# Patient Record
Sex: FEMALE | Race: WHITE | Employment: OTHER | URBAN - METROPOLITAN AREA
[De-identification: names, ages, dates, MRNs, and addresses within clinical notes are randomized per-mention and may not be internally consistent; named-entity substitution may affect disease eponyms.]

---

## 2019-05-28 ENCOUNTER — APPOINTMENT (OUTPATIENT)
Dept: CT IMAGING | Age: 84
DRG: 176 | End: 2019-05-28
Payer: MEDICARE

## 2019-05-28 ENCOUNTER — APPOINTMENT (OUTPATIENT)
Dept: GENERAL RADIOLOGY | Age: 84
DRG: 176 | End: 2019-05-28
Payer: MEDICARE

## 2019-05-28 ENCOUNTER — HOSPITAL ENCOUNTER (INPATIENT)
Age: 84
LOS: 4 days | Discharge: NURSING, REHAB OR LONG-TERM CARE | DRG: 176 | End: 2019-06-01
Attending: EMERGENCY MEDICINE | Admitting: INTERNAL MEDICINE
Payer: MEDICARE

## 2019-05-28 DIAGNOSIS — I26.99 BILATERAL PULMONARY EMBOLISM (HCC): Primary | ICD-10-CM

## 2019-05-28 DIAGNOSIS — J96.01 ACUTE RESPIRATORY FAILURE WITH HYPOXIA (HCC): ICD-10-CM

## 2019-05-28 DIAGNOSIS — S09.90XA CLOSED HEAD INJURY, INITIAL ENCOUNTER: ICD-10-CM

## 2019-05-28 DIAGNOSIS — S43.014A ANTERIOR SHOULDER DISLOCATION, RIGHT, INITIAL ENCOUNTER: ICD-10-CM

## 2019-05-28 LAB
A/G RATIO: 1.1 (ref 1.1–2.2)
ALBUMIN SERPL-MCNC: 3.7 G/DL (ref 3.4–5)
ALP BLD-CCNC: 78 U/L (ref 40–129)
ALT SERPL-CCNC: 21 U/L (ref 10–40)
ANION GAP SERPL CALCULATED.3IONS-SCNC: 15 MMOL/L (ref 3–16)
APTT: 36.1 SEC (ref 26–36)
AST SERPL-CCNC: 29 U/L (ref 15–37)
BASOPHILS ABSOLUTE: 0.1 K/UL (ref 0–0.2)
BASOPHILS RELATIVE PERCENT: 0.7 %
BILIRUB SERPL-MCNC: 0.7 MG/DL (ref 0–1)
BUN BLDV-MCNC: 23 MG/DL (ref 7–20)
CALCIUM SERPL-MCNC: 9.4 MG/DL (ref 8.3–10.6)
CHLORIDE BLD-SCNC: 103 MMOL/L (ref 99–110)
CO2: 24 MMOL/L (ref 21–32)
CREAT SERPL-MCNC: 1.1 MG/DL (ref 0.6–1.2)
EOSINOPHILS ABSOLUTE: 0.2 K/UL (ref 0–0.6)
EOSINOPHILS RELATIVE PERCENT: 2 %
GFR AFRICAN AMERICAN: 57
GFR NON-AFRICAN AMERICAN: 47
GLOBULIN: 3.3 G/DL
GLUCOSE BLD-MCNC: 197 MG/DL (ref 70–99)
HCT VFR BLD CALC: 45.3 % (ref 36–48)
HEMOGLOBIN: 15 G/DL (ref 12–16)
INR BLD: 1.1 (ref 0.86–1.14)
LYMPHOCYTES ABSOLUTE: 1.6 K/UL (ref 1–5.1)
LYMPHOCYTES RELATIVE PERCENT: 14.4 %
MCH RBC QN AUTO: 31.8 PG (ref 26–34)
MCHC RBC AUTO-ENTMCNC: 33 G/DL (ref 31–36)
MCV RBC AUTO: 96.3 FL (ref 80–100)
MONOCYTES ABSOLUTE: 1 K/UL (ref 0–1.3)
MONOCYTES RELATIVE PERCENT: 9.7 %
NEUTROPHILS ABSOLUTE: 7.9 K/UL (ref 1.7–7.7)
NEUTROPHILS RELATIVE PERCENT: 73.2 %
PDW BLD-RTO: 15.1 % (ref 12.4–15.4)
PLATELET # BLD: 186 K/UL (ref 135–450)
PMV BLD AUTO: 9.7 FL (ref 5–10.5)
POTASSIUM SERPL-SCNC: 4.1 MMOL/L (ref 3.5–5.1)
PRO-BNP: 7136 PG/ML (ref 0–449)
PROTHROMBIN TIME: 12.5 SEC (ref 9.8–13)
RBC # BLD: 4.71 M/UL (ref 4–5.2)
SODIUM BLD-SCNC: 142 MMOL/L (ref 136–145)
TOTAL PROTEIN: 7 G/DL (ref 6.4–8.2)
TROPONIN: <0.01 NG/ML
WBC # BLD: 10.9 K/UL (ref 4–11)

## 2019-05-28 PROCEDURE — 96375 TX/PRO/DX INJ NEW DRUG ADDON: CPT

## 2019-05-28 PROCEDURE — 80053 COMPREHEN METABOLIC PANEL: CPT

## 2019-05-28 PROCEDURE — 70450 CT HEAD/BRAIN W/O DYE: CPT

## 2019-05-28 PROCEDURE — 83880 ASSAY OF NATRIURETIC PEPTIDE: CPT

## 2019-05-28 PROCEDURE — 85610 PROTHROMBIN TIME: CPT

## 2019-05-28 PROCEDURE — 84484 ASSAY OF TROPONIN QUANT: CPT

## 2019-05-28 PROCEDURE — 96374 THER/PROPH/DIAG INJ IV PUSH: CPT

## 2019-05-28 PROCEDURE — 99285 EMERGENCY DEPT VISIT HI MDM: CPT

## 2019-05-28 PROCEDURE — 96361 HYDRATE IV INFUSION ADD-ON: CPT

## 2019-05-28 PROCEDURE — 72125 CT NECK SPINE W/O DYE: CPT

## 2019-05-28 PROCEDURE — 1200000000 HC SEMI PRIVATE

## 2019-05-28 PROCEDURE — 73060 X-RAY EXAM OF HUMERUS: CPT

## 2019-05-28 PROCEDURE — 6360000004 HC RX CONTRAST MEDICATION: Performed by: EMERGENCY MEDICINE

## 2019-05-28 PROCEDURE — 85730 THROMBOPLASTIN TIME PARTIAL: CPT

## 2019-05-28 PROCEDURE — 2700000000 HC OXYGEN THERAPY PER DAY

## 2019-05-28 PROCEDURE — 71260 CT THORAX DX C+: CPT

## 2019-05-28 PROCEDURE — 85025 COMPLETE CBC W/AUTO DIFF WBC: CPT

## 2019-05-28 PROCEDURE — 2580000003 HC RX 258: Performed by: NURSE PRACTITIONER

## 2019-05-28 PROCEDURE — 93005 ELECTROCARDIOGRAM TRACING: CPT | Performed by: NURSE PRACTITIONER

## 2019-05-28 PROCEDURE — 6360000002 HC RX W HCPCS: Performed by: NURSE PRACTITIONER

## 2019-05-28 PROCEDURE — 73030 X-RAY EXAM OF SHOULDER: CPT

## 2019-05-28 PROCEDURE — 94761 N-INVAS EAR/PLS OXIMETRY MLT: CPT

## 2019-05-28 RX ORDER — SODIUM CHLORIDE 9 MG/ML
INJECTION, SOLUTION INTRAVENOUS
Status: DISPENSED
Start: 2019-05-28 | End: 2019-05-29

## 2019-05-28 RX ORDER — HEPARIN SODIUM 10000 [USP'U]/100ML
18 INJECTION, SOLUTION INTRAVENOUS CONTINUOUS
Status: DISCONTINUED | OUTPATIENT
Start: 2019-05-28 | End: 2019-05-29 | Stop reason: ALTCHOICE

## 2019-05-28 RX ORDER — HEPARIN SODIUM 1000 [USP'U]/ML
80 INJECTION, SOLUTION INTRAVENOUS; SUBCUTANEOUS PRN
Status: DISCONTINUED | OUTPATIENT
Start: 2019-05-28 | End: 2019-05-29 | Stop reason: SDUPTHER

## 2019-05-28 RX ORDER — ETOMIDATE 2 MG/ML
0.3 INJECTION INTRAVENOUS ONCE
Status: DISCONTINUED | OUTPATIENT
Start: 2019-05-28 | End: 2019-05-28

## 2019-05-28 RX ORDER — PROPOFOL 10 MG/ML
10 INJECTION, EMULSION INTRAVENOUS
Status: DISCONTINUED | OUTPATIENT
Start: 2019-05-28 | End: 2019-05-29 | Stop reason: HOSPADM

## 2019-05-28 RX ORDER — PROPOFOL 10 MG/ML
INJECTION, EMULSION INTRAVENOUS
Status: DISPENSED
Start: 2019-05-28 | End: 2019-05-29

## 2019-05-28 RX ORDER — MORPHINE SULFATE 4 MG/ML
4 INJECTION, SOLUTION INTRAMUSCULAR; INTRAVENOUS ONCE
Status: COMPLETED | OUTPATIENT
Start: 2019-05-28 | End: 2019-05-28

## 2019-05-28 RX ORDER — ONDANSETRON 2 MG/ML
4 INJECTION INTRAMUSCULAR; INTRAVENOUS EVERY 30 MIN PRN
Status: DISCONTINUED | OUTPATIENT
Start: 2019-05-28 | End: 2019-05-29 | Stop reason: DRUGHIGH

## 2019-05-28 RX ORDER — HEPARIN SODIUM 1000 [USP'U]/ML
80 INJECTION, SOLUTION INTRAVENOUS; SUBCUTANEOUS ONCE
Status: COMPLETED | OUTPATIENT
Start: 2019-05-28 | End: 2019-05-28

## 2019-05-28 RX ORDER — HEPARIN SODIUM 1000 [USP'U]/ML
40 INJECTION, SOLUTION INTRAVENOUS; SUBCUTANEOUS PRN
Status: DISCONTINUED | OUTPATIENT
Start: 2019-05-28 | End: 2019-05-29 | Stop reason: SDUPTHER

## 2019-05-28 RX ORDER — 0.9 % SODIUM CHLORIDE 0.9 %
250 INTRAVENOUS SOLUTION INTRAVENOUS ONCE
Status: COMPLETED | OUTPATIENT
Start: 2019-05-28 | End: 2019-05-28

## 2019-05-28 RX ORDER — LISINOPRIL 10 MG/1
10 TABLET ORAL DAILY
Status: ON HOLD | COMMUNITY
Start: 2011-09-07 | End: 2019-05-30 | Stop reason: HOSPADM

## 2019-05-28 RX ADMIN — ONDANSETRON 4 MG: 2 INJECTION INTRAMUSCULAR; INTRAVENOUS at 20:32

## 2019-05-28 RX ADMIN — HEPARIN SODIUM 5080 UNITS: 1000 INJECTION, SOLUTION INTRAVENOUS; SUBCUTANEOUS at 22:59

## 2019-05-28 RX ADMIN — SODIUM CHLORIDE 250 ML: 9 INJECTION, SOLUTION INTRAVENOUS at 20:32

## 2019-05-28 RX ADMIN — IOPAMIDOL 75 ML: 755 INJECTION, SOLUTION INTRAVENOUS at 20:59

## 2019-05-28 RX ADMIN — HEPARIN SODIUM AND DEXTROSE 18 UNITS/KG/HR: 10000; 5 INJECTION INTRAVENOUS at 22:59

## 2019-05-28 RX ADMIN — MORPHINE SULFATE 4 MG: 4 INJECTION INTRAVENOUS at 20:31

## 2019-05-28 ASSESSMENT — PAIN SCALES - GENERAL: PAINLEVEL_OUTOF10: 10

## 2019-05-28 ASSESSMENT — PAIN DESCRIPTION - LOCATION: LOCATION: SHOULDER

## 2019-05-28 ASSESSMENT — ENCOUNTER SYMPTOMS
NAUSEA: 0
VOMITING: 0
CHEST TIGHTNESS: 0
DIARRHEA: 0
ABDOMINAL PAIN: 0
SHORTNESS OF BREATH: 0

## 2019-05-28 ASSESSMENT — PAIN DESCRIPTION - ORIENTATION: ORIENTATION: RIGHT

## 2019-05-28 ASSESSMENT — PAIN DESCRIPTION - PAIN TYPE: TYPE: ACUTE PAIN

## 2019-05-28 NOTE — ED NOTES
Bed: 30  Expected date:   Expected time:   Means of arrival: [facility]  Comments:  [facility]     Annette King RN  05/28/19 9851

## 2019-05-29 LAB
APTT: 206.8 SEC (ref 26–36)
APTT: 29.5 SEC (ref 26–36)
APTT: 84 SEC (ref 26–36)
BILIRUBIN URINE: NEGATIVE
BLOOD, URINE: NEGATIVE
CLARITY: CLEAR
COLOR: YELLOW
EKG ATRIAL RATE: 97 BPM
EKG DIAGNOSIS: NORMAL
EKG P AXIS: 72 DEGREES
EKG P-R INTERVAL: 194 MS
EKG Q-T INTERVAL: 316 MS
EKG QRS DURATION: 72 MS
EKG QTC CALCULATION (BAZETT): 401 MS
EKG R AXIS: 90 DEGREES
EKG T AXIS: 75 DEGREES
EKG VENTRICULAR RATE: 97 BPM
GLUCOSE BLD-MCNC: 215 MG/DL (ref 70–99)
GLUCOSE BLD-MCNC: 215 MG/DL (ref 70–99)
GLUCOSE BLD-MCNC: 219 MG/DL (ref 70–99)
GLUCOSE BLD-MCNC: 241 MG/DL (ref 70–99)
GLUCOSE BLD-MCNC: 243 MG/DL (ref 70–99)
GLUCOSE URINE: NEGATIVE MG/DL
HCT VFR BLD CALC: 41.6 % (ref 36–48)
HEMOGLOBIN: 13.5 G/DL (ref 12–16)
KETONES, URINE: NEGATIVE MG/DL
LEUKOCYTE ESTERASE, URINE: NEGATIVE
MCH RBC QN AUTO: 31.4 PG (ref 26–34)
MCHC RBC AUTO-ENTMCNC: 32.4 G/DL (ref 31–36)
MCV RBC AUTO: 97 FL (ref 80–100)
MICROSCOPIC EXAMINATION: NORMAL
NITRITE, URINE: NEGATIVE
PDW BLD-RTO: 15.2 % (ref 12.4–15.4)
PERFORMED ON: ABNORMAL
PH UA: 5.5 (ref 5–8)
PLATELET # BLD: 189 K/UL (ref 135–450)
PMV BLD AUTO: 8.8 FL (ref 5–10.5)
PROTEIN UA: NEGATIVE MG/DL
RBC # BLD: 4.29 M/UL (ref 4–5.2)
SPECIFIC GRAVITY UA: >1.03 (ref 1–1.03)
URINE TYPE: NORMAL
UROBILINOGEN, URINE: 0.2 E.U./DL
WBC # BLD: 16.4 K/UL (ref 4–11)

## 2019-05-29 PROCEDURE — 1200000000 HC SEMI PRIVATE

## 2019-05-29 PROCEDURE — 2700000000 HC OXYGEN THERAPY PER DAY

## 2019-05-29 PROCEDURE — 85730 THROMBOPLASTIN TIME PARTIAL: CPT

## 2019-05-29 PROCEDURE — 2580000003 HC RX 258: Performed by: INTERNAL MEDICINE

## 2019-05-29 PROCEDURE — 93010 ELECTROCARDIOGRAM REPORT: CPT | Performed by: INTERNAL MEDICINE

## 2019-05-29 PROCEDURE — 6370000000 HC RX 637 (ALT 250 FOR IP): Performed by: INTERNAL MEDICINE

## 2019-05-29 PROCEDURE — 81003 URINALYSIS AUTO W/O SCOPE: CPT

## 2019-05-29 PROCEDURE — 94760 N-INVAS EAR/PLS OXIMETRY 1: CPT

## 2019-05-29 PROCEDURE — 36415 COLL VENOUS BLD VENIPUNCTURE: CPT

## 2019-05-29 PROCEDURE — 6360000002 HC RX W HCPCS: Performed by: INTERNAL MEDICINE

## 2019-05-29 PROCEDURE — 99221 1ST HOSP IP/OBS SF/LOW 40: CPT | Performed by: NURSE PRACTITIONER

## 2019-05-29 PROCEDURE — 85027 COMPLETE CBC AUTOMATED: CPT

## 2019-05-29 RX ORDER — HEPARIN SODIUM 1000 [USP'U]/ML
80 INJECTION, SOLUTION INTRAVENOUS; SUBCUTANEOUS PRN
Status: DISCONTINUED | OUTPATIENT
Start: 2019-05-29 | End: 2019-05-29

## 2019-05-29 RX ORDER — HEPARIN SODIUM 10000 [USP'U]/100ML
18 INJECTION, SOLUTION INTRAVENOUS CONTINUOUS
Status: DISCONTINUED | OUTPATIENT
Start: 2019-05-29 | End: 2019-05-30

## 2019-05-29 RX ORDER — ONDANSETRON 2 MG/ML
4 INJECTION INTRAMUSCULAR; INTRAVENOUS EVERY 6 HOURS PRN
Status: DISCONTINUED | OUTPATIENT
Start: 2019-05-29 | End: 2019-06-01 | Stop reason: HOSPADM

## 2019-05-29 RX ORDER — SODIUM CHLORIDE 0.9 % (FLUSH) 0.9 %
10 SYRINGE (ML) INJECTION PRN
Status: DISCONTINUED | OUTPATIENT
Start: 2019-05-29 | End: 2019-06-01 | Stop reason: HOSPADM

## 2019-05-29 RX ORDER — MORPHINE SULFATE 2 MG/ML
2 INJECTION, SOLUTION INTRAMUSCULAR; INTRAVENOUS
Status: DISCONTINUED | OUTPATIENT
Start: 2019-05-29 | End: 2019-06-01 | Stop reason: HOSPADM

## 2019-05-29 RX ORDER — HEPARIN SODIUM 1000 [USP'U]/ML
80 INJECTION, SOLUTION INTRAVENOUS; SUBCUTANEOUS ONCE
Status: COMPLETED | OUTPATIENT
Start: 2019-05-29 | End: 2019-05-29

## 2019-05-29 RX ORDER — HEPARIN SODIUM 1000 [USP'U]/ML
40 INJECTION, SOLUTION INTRAVENOUS; SUBCUTANEOUS PRN
Status: DISCONTINUED | OUTPATIENT
Start: 2019-05-29 | End: 2019-05-29

## 2019-05-29 RX ORDER — HEPARIN SODIUM 1000 [USP'U]/ML
80 INJECTION, SOLUTION INTRAVENOUS; SUBCUTANEOUS ONCE
Status: DISCONTINUED | OUTPATIENT
Start: 2019-05-29 | End: 2019-05-29

## 2019-05-29 RX ORDER — SODIUM CHLORIDE 0.9 % (FLUSH) 0.9 %
10 SYRINGE (ML) INJECTION EVERY 12 HOURS SCHEDULED
Status: DISCONTINUED | OUTPATIENT
Start: 2019-05-29 | End: 2019-06-01 | Stop reason: HOSPADM

## 2019-05-29 RX ORDER — FUROSEMIDE 20 MG/1
TABLET ORAL
Status: ON HOLD | COMMUNITY
Start: 2019-04-09 | End: 2019-05-30 | Stop reason: SDUPTHER

## 2019-05-29 RX ORDER — WARFARIN SODIUM 2 MG/1
2 TABLET ORAL
Status: ON HOLD | COMMUNITY
Start: 2019-04-09 | End: 2019-05-30 | Stop reason: HOSPADM

## 2019-05-29 RX ORDER — HEPARIN SODIUM 10000 [USP'U]/100ML
18 INJECTION, SOLUTION INTRAVENOUS CONTINUOUS
Status: DISCONTINUED | OUTPATIENT
Start: 2019-05-29 | End: 2019-05-29

## 2019-05-29 RX ORDER — HEPARIN SODIUM 1000 [USP'U]/ML
40 INJECTION, SOLUTION INTRAVENOUS; SUBCUTANEOUS PRN
Status: DISCONTINUED | OUTPATIENT
Start: 2019-05-29 | End: 2019-05-30

## 2019-05-29 RX ORDER — EZETIMIBE 10 MG/1
10 TABLET ORAL
COMMUNITY
Start: 2018-10-01

## 2019-05-29 RX ORDER — 0.9 % SODIUM CHLORIDE 0.9 %
1000 INTRAVENOUS SOLUTION INTRAVENOUS ONCE
Status: COMPLETED | OUTPATIENT
Start: 2019-05-29 | End: 2019-05-29

## 2019-05-29 RX ORDER — IBUPROFEN 200 MG
200 TABLET ORAL
COMMUNITY

## 2019-05-29 RX ORDER — HEPARIN SODIUM 1000 [USP'U]/ML
80 INJECTION, SOLUTION INTRAVENOUS; SUBCUTANEOUS PRN
Status: DISCONTINUED | OUTPATIENT
Start: 2019-05-29 | End: 2019-05-30

## 2019-05-29 RX ADMIN — INSULIN LISPRO 4 UNITS: 100 INJECTION, SOLUTION INTRAVENOUS; SUBCUTANEOUS at 18:39

## 2019-05-29 RX ADMIN — Medication 10 ML: at 09:11

## 2019-05-29 RX ADMIN — INSULIN LISPRO 4 UNITS: 100 INJECTION, SOLUTION INTRAVENOUS; SUBCUTANEOUS at 12:10

## 2019-05-29 RX ADMIN — INSULIN LISPRO 2 UNITS: 100 INJECTION, SOLUTION INTRAVENOUS; SUBCUTANEOUS at 22:19

## 2019-05-29 RX ADMIN — HEPARIN SODIUM AND DEXTROSE 12 UNITS/KG/HR: 10000; 5 INJECTION INTRAVENOUS at 23:07

## 2019-05-29 RX ADMIN — INSULIN LISPRO 4 UNITS: 100 INJECTION, SOLUTION INTRAVENOUS; SUBCUTANEOUS at 09:10

## 2019-05-29 RX ADMIN — HEPARIN SODIUM AND DEXTROSE 18 UNITS/KG/HR: 10000; 5 INJECTION INTRAVENOUS at 01:53

## 2019-05-29 RX ADMIN — HEPARIN SODIUM AND DEXTROSE 18 UNITS/KG/HR: 10000; 5 INJECTION INTRAVENOUS at 15:08

## 2019-05-29 RX ADMIN — HEPARIN SODIUM 6700 UNITS: 1000 INJECTION, SOLUTION INTRAVENOUS; SUBCUTANEOUS at 15:09

## 2019-05-29 RX ADMIN — SODIUM CHLORIDE 1000 ML: 9 INJECTION, SOLUTION INTRAVENOUS at 19:59

## 2019-05-29 SDOH — HEALTH STABILITY: MENTAL HEALTH: HOW OFTEN DO YOU HAVE A DRINK CONTAINING ALCOHOL?: MONTHLY OR LESS

## 2019-05-29 ASSESSMENT — PAIN DESCRIPTION - PAIN TYPE
TYPE: ACUTE PAIN

## 2019-05-29 ASSESSMENT — PAIN DESCRIPTION - FREQUENCY
FREQUENCY: INTERMITTENT
FREQUENCY: INTERMITTENT

## 2019-05-29 ASSESSMENT — PAIN DESCRIPTION - LOCATION
LOCATION: SHOULDER

## 2019-05-29 ASSESSMENT — PAIN DESCRIPTION - DESCRIPTORS
DESCRIPTORS: DULL;DISCOMFORT
DESCRIPTORS: DULL;DISCOMFORT

## 2019-05-29 ASSESSMENT — PAIN SCALES - GENERAL
PAINLEVEL_OUTOF10: 0
PAINLEVEL_OUTOF10: 4
PAINLEVEL_OUTOF10: 0
PAINLEVEL_OUTOF10: 0
PAINLEVEL_OUTOF10: 5

## 2019-05-29 ASSESSMENT — PAIN DESCRIPTION - ORIENTATION
ORIENTATION: RIGHT

## 2019-05-29 NOTE — PROGRESS NOTES
Shift assessment completed and documented at this time. Pt resting quietly in bed, resps easy and unlabored. O2 on at 5L/NC. Sling and swathe intact to RUE/shoulder. Vascular checks to RUE WNL. Assisted with breakfast tray this morning. Pt able to feed self after set up. Dr Gloria Vega here on unit to see patients. Made aware of low B/P this AM and blood sugar of 215. New orders written. The care plan and education has been reviewed and mutually agreed upon with the patient. Call light in reach. Will continue to monitor. Pt denies needs at this time.

## 2019-05-29 NOTE — ED NOTES
Dr. Dominique Morales, [place], Luis Hope, and Wendi Benites, [facility]. Radiology and RN remain at bedside at this time.       Howard Morrison RN  05/28/19 7686

## 2019-05-29 NOTE — ED PROVIDER NOTES
I independently performed a history and physical on [address]. All diagnostic, treatment, and disposition decisions were made by myself in conjunction with the advanced practice provider. I have participated in the medical decision making and directed the treatment plan and disposition of the patient. For further details of [facility] emergency department encounter, please see the advanced practice provider's documentation. CHIEF COMPLAINT  Chief Complaint   Patient presents with    Fall     Pt from [facility] via [address] [facility] for c/o mechanical fall from standing, pain to right shoulder, RUE. Denies hitting head, denies LoC. PMS, uncable to move right arm. Pt alert, oriented x4. Briefly, Justo Honeycutt is a 81 y.o. female  who presents to the ED complaining of fall today. C/o mostly of R shoulder pain, no head or neck injury. Found to be repeatedly hypoxic in triage and despite this does not complain of SOB or CP of any kind or syncope/lightheadedness. She is having no pain in the other extremities. FOCUSED PHYSICAL EXAMINATION  BP (!) 108/41   Pulse 81   Temp 96.7 °F (35.9 °C) (Oral)   Resp 16   Ht 5' 5\" (1.651 m)   Wt 140 lb (63.5 kg)   SpO2 94%   BMI 23.30 kg/m²    Focused physical examination notable for no acute distress, well-appearing, well-nourished, normal speech and mentation without obvious facial droop, no obvious rash. No obvious cranial nerve deficits on my initial exam. RRR, bibasilar rhonchi, no rales/wheezing, no chest wall ttp, NC/AT, no C/T/L spine ttp. R shoulder is grossly dislocated, no ttp in extremities otherwise.     The 12 lead EKG was interpreted by me as follows:  Rate: normal with a rate of 97  Rhythm: sinus  Axis: right deviation  Intervals: normal OH, narrow QRS, normal QTc  ST segments: no ST elevations or depressions  T waves: no abnormal inversions  Non-specific T wave changes: present  Prior EKG comparison: No prior is currently available for comparison    MDM:  ED course was notable for R shoulder dislocation inferiorly and anteriorly. This proved difficult to reduce on her first attempt under conscious sedation. I consulted and spoke with Dr. Colton Zurita from orthopedics about the patient's ED history, physical, workup, and course so far. Recommendations from this consultant included try again - we did so under another conscious sedation and successful. Sling/swath applied, pt is generally comfortable at rest.    Incidentally also noted was hypoxia, despite no SOB or CP is found to have bilateral PE's without infiltrate or infarction, and is not hypotensive or tachycardic. Comfortable on NC oxygen. Heparinize. No traumatic findings in the chest otherwise though. Remainder of trauma imaging is negative. I was present at bedside supervising and assisting Harish  YOHANA in the reduction of the shoulder - I was managing the conscious sedation myself as well. See her note for procedure of the shoulder reduction and my note below for the conscious sedation x2. Conscious Sedation Procedure Note    Indication: shoulder dislocation    Consent: I have discussed with the patient and/or the patient representative the indication, alternatives, and the possible risks and/or complications of the planned procedure and the anesthesia methods. The patient and/or patient representative appear to understand and agree to proceed. Physician Involvement: The attending physician was present and supervising this procedure. Pre-Sedation Documentation and Exam: I have personally completed a history, physical exam & review of systems for this patient (see notes).   Airway Assessment: normal, Mallampati Class II - (soft palate, fauces & uvula are visible)    Prior History of Anesthesia Complications: none    ASA Classification: Class 2 - A normal healthy patient with mild systemic disease    Sedation/ Anesthesia Plan: intravenous sedation    Medications Used: propofol intravenously - 60mg total on first attempt, 100mg on second attempt    Monitoring and Safety: The patient was placed on a cardiac monitor and vital signs, pulse oximetry and level of consciousness were continuously evaluated throughout the procedure. The patient was closely monitored until recovery from the medications was complete and the patient had returned to baseline status. Respiratory therapy was on standby at all times during the procedure. (The following sections must be completed)  Post-Sedation Vital Signs: Vital signs were reviewed and were stable after the procedure (see flow sheet for vitals)            Post-Sedation Exam: Lungs: clear to auscultation bilaterally and Cardiovascular: regular rate and rhythm           Complications: transient hypoxia versus artifact during second sedation, required BVM briefly    During the patient's ED course, the patient was given:  Medications   ondansetron (ZOFRAN) injection 4 mg (4 mg Intravenous Given 5/28/19 2032)   propofol 1000 MG/100ML injection (has no administration in time range)   heparin (porcine) injection 5,080 Units (has no administration in time range)   heparin (porcine) injection 2,540 Units (has no administration in time range)   heparin 25,000 units in dextrose 5% 250 mL infusion (18 Units/kg/hr × 63.5 kg Intravenous New Bag 5/28/19 2259)   propofol 1000 MG/100ML injection (has no administration in time range)   propofol 200 MG/20ML injection (has no administration in time range)   sodium chloride 0.9 % infusion (has no administration in time range)   morphine injection 4 mg (4 mg Intravenous Given 5/28/19 2031)   0.9 % sodium chloride bolus (0 mLs Intravenous Stopped 5/28/19 2307)   iopamidol (ISOVUE-370) 76 % injection 75 mL (75 mLs Intravenous Given 5/28/19 2059)   heparin (porcine) injection 5,080 Units (5,080 Units Intravenous Given 5/28/19 2259)        CLINICAL IMPRESSION  1. Bilateral pulmonary embolism ([address]    2.  Acute

## 2019-05-29 NOTE — CONSULTS
[facility] Orthopedic Surgery  Consult Note    Patient: Get Burks Date: 5/28/2019  Requesting Physician: Portillo Sutton MD  Room: [address] [place], [place] [id number]    Chief complaint: RIGHT shoulder pain    HPI: Jeferson Greene is a 81 y.o. female who presented to [facility] ER 5/28/19 after a fall from standing, with c/o of RIGHT shoulder pain and inability to move her right arm. She is RIGHT hand dominant. Imaging demonstrated shoulder dislocation which was reduced in the ER. Denies other injuries. Describes no pain in the RIGHT shoulder at this time. Pain was relieved with shoulder reduction. Denies numbness/tingling. Imaging review of RIGHT shoulder demonstrated:   Status post reduction of previously demonstrated right glenohumeral   dislocation.           Medical History:  Past Medical History:   Diagnosis Date    Anemia     Colon cancer ([address]     Diabetes mellitus ([facility]     not on meds    Fall     Gout     Hypercholesterolemia     Hypertension     Liver disease     Memory loss     Pulmonary embolism (HCC)     Rosacea     Vitamin D deficiency      Past Surgical History:   Procedure Laterality Date    CATARACT REMOVAL Bilateral 2010    CHOLECYSTECTOMY      COLECTOMY      DILATION AND CURETTAGE OF UTERUS      HIP ARTHROPLASTY Left 2012    LIVER RESECTION Right     TOTAL NEPHRECTOMY Left     d/t hydronephrosis       Social History:    reports that she quit smoking about 39 years ago. Her smoking use included cigarettes. She quit after 15.00 years of use. She has never used smokeless tobacco.    Family History:  No family history on file.     Medications:  ALL MEDICATIONS HAVE BEEN REVIEWED:  Scheduled:   sodium chloride flush  10 mL Intravenous 2 times per day    heparin (porcine)  80 Units/kg Intravenous Once    insulin lispro  0-12 Units Subcutaneous TID WC    insulin lispro  0-6 Units Subcutaneous Nightly    propofol         Continuous:   heparin (porcine) 18 Units/kg/hr (05/29/19 0153)    heparin (porcine) Stopped (05/29/19 0152)    sodium chloride       PRN:morphine, sodium chloride flush, magnesium hydroxide, ondansetron, heparin (porcine), heparin (porcine)    Allergies: Allergies   Allergen Reactions    Hydrochlorothiazide W-Triamterene Rash    Iodinated Diagnostic Agents Rash     IVP Dye caused a rash    Spironolactone Rash    Tobramycin Sulfate Swelling and Rash     Eye ointment caused redness & swelling       Review of Systems:  Constitutional: Negative for fever, chills, fatigue. Skin:  Negative for pruritis, rash  Eyes: Negative for photophobia and visual disturbance. ENT:  Negative for rhinorrhea, epistaxis, sore throat  Respiratory:  Negative for cough and shortness of breath. Cardiovascular: Negative for chest pain. Gastrointestinal: Negative for nausea, vomiting, diarrhea. Genitourinary: Negative for dysuria and difficulty urinating. Neurological: Negative for confusion, dysarthria, tremors, seizures. Psychiatric:  Negative for depression or anxiety  Musculoskeletal:  Negative for pain/numbness in RIGHT shoulder since reduction. Objective:  Vitals:    05/29/19 0745   BP: 93/60   Pulse: 89   Resp: 16   Temp: 97.3 °F (36.3 °C)   SpO2: 95%      Physical Examination:  GENERAL: No apparent distress, well-nourished  SKIN:  Warm and dry  EYES: Nonicteric. ENT: Mucous membranes moist  HEAD: Normocephalic, atraumatic  RESPIRATORY: Resp easy and unlabored  CARDIOVASCULAR: Regular rate and rhythm  GI: Abdomen soft, nontender  NEURO: Awake and alert.   No speech defect  PSYCHIATRIC: Appropriate affect; not agitated  MUSCULOSKELETAL:  RIGHT shoulder  Inspection: Skin intact w/o lesion, laceration, erythema or ecchymosis  Palpation: nontender   Motor: NVI in median, ulnar, radial nerve distributions; extends/flexes wrist  Sensation: axillary distribution  Vascular:  Intact radial pulse    Labs reviewed:  Recent Labs     05/28/19 2015 05/29/19  0433   WBC 10.9 16.4*   HGB 15.0 13.5   HCT 45.3 41.6    189     Recent Labs     05/28/19 2017      K 4.1      CO2 24   BUN 23*   CREATININE 1.1   GLUCOSE 197*   CALCIUM 9.4     Recent Labs     05/28/19 2015   INR 1.10   PROTIME 12.5       Lab Results   Component Value Date    COLORU YELLOW 05/29/2019    CLARITYU Clear 05/29/2019    PHUR 5.5 05/29/2019    GLUCOSEU Negative 05/29/2019    BLOODU Negative 05/29/2019    LEUKOCYTESUR Negative 05/29/2019    BILIRUBINUR Negative 05/29/2019    UROBILINOGEN 0.2 05/29/2019       Imaging:  XR SHOULDER RIGHT (MIN 2 VIEWS)   Final Result   Status post reduction of previously demonstrated right glenohumeral   dislocation. XR SHOULDER RIGHT (MIN 2 VIEWS)   Final Result   Shoulder remains dislocated. CT CHEST W CONTRAST   Final Result   Bilateral pulmonary artery emboli. There is underlying emphysema and   atelectasis at the lung bases. Dislocation at the glenohumeral joint on the right. No acute intracranial abnormality. There is atrophy and small-vessel   ischemic change      Scattered levels of degenerative change in the cervical spine. No acute   osseous abnormality      Results discussed with Dr. Juventino Sharma. Kaitlin Barlow MD at 9:46 pm on   5/28/2019         CT CERVICAL SPINE WO CONTRAST   Final Result   Bilateral pulmonary artery emboli. There is underlying emphysema and   atelectasis at the lung bases. Dislocation at the glenohumeral joint on the right. No acute intracranial abnormality. There is atrophy and small-vessel   ischemic change      Scattered levels of degenerative change in the cervical spine. No acute   osseous abnormality      Results discussed with Dr. Juventino Sharma. Kaitlin Barlow MD at 9:46 pm on   5/28/2019         CT Head WO Contrast   Final Result   Bilateral pulmonary artery emboli. There is underlying emphysema and   atelectasis at the lung bases. Dislocation at the glenohumeral joint on the right. No acute intracranial abnormality. There is atrophy and small-vessel   ischemic change      Scattered levels of degenerative change in the cervical spine. No acute   osseous abnormality      Results discussed with Dr. Tamela Alvarez. Quinn Lentz MD at 9:46 pm on   5/28/2019         XR HUMERUS RIGHT (MIN 2 VIEWS)   Final Result   Anterior inferior dislocation of the humerus             IMPRESSION:  RIGHT shoulder dislocation  Active Problems:    Pulmonary embolism, bilateral ([address]  Resolved Problems:    * No resolved hospital problems. *      RECOMMENDATIONS:  Shoulder was reduced in ER  Sling & swathe x 2 weeks; discussed with patient  May remove 3x day for elbow/wrist ROM with NO external rotation of shoulder. No pushing, pulling, lifting with RIGHT UE  Pain control per primary team  PT/OT  F U with Dr. Vipin Lipscomb in 2 weeks; call [phone] for appt. Patient does not use tobacco products     I have reviewed imaging and plan with Dr. Vipin Lipscomb.         ADAM GUERRA, KEREN-CNP  5/29/2019  9:57 AM

## 2019-05-29 NOTE — PROGRESS NOTES
4 Eyes Skin Assessment     The patient is being assess for    Admission    I agree that 2 RN's have performed a thorough Head to Toe Skin Assessment on the patient. ALL assessment sites listed below have been assessed. Areas assessed by both nurses: ***  [x]   Head, Face, and Ears   [x]   Shoulders, Back, and Chest  [x]   Arms, Elbows, and Hands   [x]   Coccyx, Sacrum, and IschIum  [x]   Legs, Feet, and Heels        Does the Patient have Skin Breakdown?   No         Bakari Prevention initiated:  Yes  Wound Care Orders initiated:  No      [facility] nurse consulted for Pressure Injury (Stage 3,4, Unstageable, DTI, NWPT, and Complex wounds), New and Established Ostomies:  No    Nurse 1 eSignature: Electronically signed by Brock Jhaveri RN on 5/29/19 at 6:48 AM    **SHARE this note so that the co-signing nurse is able to place an eSignature**    Nurse 2 eSignature: {Esignature:[id number]}

## 2019-05-29 NOTE — ED NOTES
Additional propofol 20mg administered by MARIA EUGENIA Vizcaino.       Enzo Delarosa RN  05/28/19 8563

## 2019-05-29 NOTE — PLAN OF CARE
Problem: Pain:  Goal: Pain level will decrease  Description  Pain level will decrease  Outcome: Ongoing  Goal: Control of acute pain  Description  Control of acute pain  Outcome: Ongoing  Goal: Control of chronic pain  Description  Control of chronic pain  Outcome: Ongoing     Problem: Falls - Risk of:  Goal: Will remain free from falls  Description  Will remain free from falls  Outcome: Ongoing  Goal: Absence of physical injury  Description  Absence of physical injury  Outcome: Ongoing     Problem: Venous Thromboembolism:  Goal: Will show no signs or symptoms of venous thromboembolism  Description  Will show no signs or symptoms of venous thromboembolism  Outcome: Ongoing  Goal: Absence of signs or symptoms of impaired coagulation  Description  Absence of signs or symptoms of impaired coagulation  Outcome: Ongoing     Problem: Mobility - Impaired:  Goal: Mobility will improve to maximum level  Description  Mobility will improve to maximum level  Outcome: Ongoing     The care plan and education has been reviewed and mutually agreed upon with the patient.

## 2019-05-29 NOTE — ED NOTES
Telemetry confirmed and report given to Tuan Crawley. Answered all questions, agreeable to transfer. Pt transported in bed, on telemetry, with IVF infusing, on oxygen, by ED tech Hipolito Rogers, with belongings. No distress noted.       Janet Casper RN  05/29/19 9338

## 2019-05-29 NOTE — ED NOTES
SpO2 drop to 71% on 4L/min per nasal cannula. RT bagging at this time.       Aric Valencia RN  05/28/19 8984

## 2019-05-29 NOTE — ED PROVIDER NOTES
[address]        Pt Name: Shad Mariscal  MRN: [id number]  [place] 1935  Date of evaluation: 5/28/2019  Provider: KEREN Mcarthur CNP  PCP: Bonita Gustafson    This patient was seen and evaluated by the attending physician Hiram Gauthier MD.      [address] [place] [address]       Chief Complaint   Patient presents with    Fall     Pt from [facility] via [facility] for c/o mechanical fall from standing, pain to right shoulder, RUE. Denies hitting head, denies LoC. PMS, uncable to move right arm. Pt alert, oriented x4. HISTORY OF PRESENT ILLNESS   (Location/Symptom, Timing/Onset, Context/Setting, Quality, Duration, Modifying Factors, Severity)  Note limiting factors. Shad Mariscal is a 81 y.o. female presents to the emergency department complaining of right arm/shoulder pain with limited range of joint motion. The patient reports that she had a mechanical fall while walking through her home. States she did not hit her head or lose consciousness. No vomiting. She does remember the entire event. Upon arrival, it is noted that the patient is hypoxic, she denies any shortness of breath, history of supplemental oxygen use, or chest pain. She is uncertain how long her oxygen saturation has been low. Denies any headache, fever, lightheadedness, dizziness, visual disturbances. No chest pain or pressure. No neck or back pain. No shortness of breath, cough, or congestion. No abdominal pain, nausea, vomiting, diarrhea, constipation, or dysuria. No rash. Nursing Notes were all reviewed and agreed with or any disagreements were addressed  in the HPI. REVIEW OF SYSTEMS    (2-9 systems for level 4, 10 or more for level 5)     Review of Systems   Constitutional: Negative for activity change, chills and fever. Respiratory: Negative for chest tightness and shortness of breath.     Cardiovascular: Negative for chest pain.   Gastrointestinal: Negative for abdominal pain, diarrhea, nausea and vomiting. Genitourinary: Negative for dysuria. Musculoskeletal:        Right shoulder/arm pain, limited range of joint motion   All other systems reviewed and are negative. Positives and Pertinent negatives as per HPI. Except as noted abovein the ROS, all other systems were reviewed and negative. PAST MEDICAL HISTORY     Past Medical History:   Diagnosis Date    Anemia     [facility] ([facility]     Diabetes mellitus ([facility]     not on meds    Fall     Gout     Hypercholesterolemia     Hypertension     Liver disease     Memory loss     Pulmonary embolism (HCC)     Rosacea     Vitamin D deficiency          SURGICAL HISTORY     Past Surgical History:   Procedure Laterality Date    CATARACT REMOVAL Bilateral 2010    CHOLECYSTECTOMY      COLECTOMY      DILATION AND CURETTAGE OF UTERUS      HIP ARTHROPLASTY Left 2012    LIVER RESECTION Right     TOTAL NEPHRECTOMY Left     d/t hydronephrosis         CURRENTMEDICATIONS       Current Discharge Medication List      CONTINUE these medications which have NOT CHANGED    Details   ezetimibe (ZETIA) 10 MG tablet Take 10 mg by mouth      furosemide (LASIX) 20 MG tablet TAKE 2 TABLETS DAILY      rivaroxaban (XARELTO) 20 MG TABS tablet Take 20 mg by mouth      warfarin (COUMADIN) 2 MG tablet Take 2 mg by mouth      lisinopril (PRINIVIL;ZESTRIL) 10 MG tablet Take 10 mg by mouth daily      Cholecalciferol 2000 units CAPS Take 1 capsule by mouth      ibuprofen (ADVIL) 200 MG tablet Take 200 mg by mouth      Multiple Vitamins-Minerals (MULTIVITAMIN PO) Take 1 tablet by mouth daily               ALLERGIES     Hydrochlorothiazide w-triamterene; Iodinated diagnostic agents; Spironolactone; and Tobramycin sulfate    FAMILYHISTORY     No family history on file. SOCIAL HISTORY       Social History     Socioeconomic History    Marital status:       Spouse name: None    Number of children: None    Years of education: None    Highest education level: None   Occupational History    None   Social Needs    Financial resource strain: None    Food insecurity:     Worry: None     Inability: None    Transportation needs:     Medical: None     Non-medical: None   Tobacco Use    Smoking status: None   Substance and Sexual Activity    Alcohol use: None    Drug use: None    Sexual activity: None   Lifestyle    Physical activity:     Days per week: None     Minutes per session: None    Stress: None   Relationships    Social connections:     Talks on phone: None     Gets together: None     Attends Restoration service: None     Active member of club or organization: None     Attends meetings of clubs or organizations: None     Relationship status: None    Intimate partner violence:     Fear of current or ex partner: None     Emotionally abused: None     Physically abused: None     Forced sexual activity: None   Other Topics Concern    None   Social History Narrative    None       SCREENINGS             PHYSICAL EXAM    (up to 7 for level 4, 8 or more for level 5)     ED Triage Vitals [05/28/19 1847]   BP Temp Temp Source Pulse Resp SpO2 Height Weight   127/78 96.7 °F (35.9 °C) Oral 102 20 (!) 81 % 5' 5\" (1.651 m) 140 lb (63.5 kg)       Physical Exam   Constitutional: She is oriented to person, place, and time. She appears well-developed and well-nourished. No distress. HENT:   Head: Normocephalic and atraumatic. Right Ear: External ear normal.   Left Ear: External ear normal.   Eyes: Right eye exhibits no discharge. Left eye exhibits no discharge. Neck: Normal range of motion. Neck supple. No JVD present. Cardiovascular: Regular rhythm. Tachycardia present. Pulmonary/Chest: Effort normal. No respiratory distress. Abdominal: Soft. Musculoskeletal:        Right shoulder: She exhibits decreased range of motion and tenderness.    Neurological: She is alert and oriented to person, place, and time. Skin: Skin is warm and dry. She is not diaphoretic. No pallor. Psychiatric: She has a normal mood and affect. Her behavior is normal.   Nursing note and vitals reviewed. DIAGNOSTIC RESULTS   LABS:    Labs Reviewed   CBC WITH AUTO DIFFERENTIAL - Abnormal; Notable for the following components:       Result Value    Neutrophils # 7.9 (*)     All other components within normal limits    Narrative:     Performed at:  [facility] [address]   Phone [phone]   COMPREHENSIVE METABOLIC PANEL - Abnormal; Notable for the following components:    Glucose 197 (*)     BUN 23 (*)     GFR Non- 47 (*)     GFR  57 (*)     All other components within normal limits    Narrative:     Performed at:  [facility] [address]   Phone 21  - Abnormal; Notable for the following components:    Pro-BNP 7,136 (*)     All other components within normal limits    Narrative:     Performed at:  [facility] [address]   Phone [phone]   APTT - Abnormal; Notable for the following components:    aPTT 36.1 (*)     All other components within normal limits    Narrative:     Performed at:  [facility]  [address]   Phone [phone]   TROPONIN    Narrative:     Performed at:  [facility] [address]   Phone [phone]   PROTIME-INR    Narrative:     Performed at:  [facility] [address]   Phone [phone]   APTT   CBC   APTT       All other labs were within normal range or not returned as of this dictation. EKG:  All EKG's are interpreted by the Emergency Department Physician who either signs orCo-signs this chart in the absence of a cardiologist.  Please see their note for interpretation of EKG. RADIOLOGY:   Non-plain film images such as CT, Ultrasound and MRI are read by the radiologist. Plain radiographic images are visualized andpreliminarily interpreted by the  ED Provider with the below findings:        Interpretation perthe Radiologist below, if available at the time of this note:    XR SHOULDER RIGHT (MIN 2 VIEWS)   Final Result   Status post reduction of previously demonstrated right glenohumeral   dislocation. XR SHOULDER RIGHT (MIN 2 VIEWS)   Final Result   Shoulder remains dislocated. CT CHEST W CONTRAST   Final Result   Bilateral pulmonary artery emboli. There is underlying emphysema and   atelectasis at the lung bases. Dislocation at the glenohumeral joint on the right. No acute intracranial abnormality. There is atrophy and small-vessel   ischemic change      Scattered levels of degenerative change in the cervical spine. No acute   osseous abnormality      Results discussed with Dr. Davian Talavera. Maricruz Mckeon MD at 9:46 pm on   5/28/2019         CT CERVICAL SPINE WO CONTRAST   Final Result   Bilateral pulmonary artery emboli. There is underlying emphysema and   atelectasis at the lung bases. Dislocation at the glenohumeral joint on the right. No acute intracranial abnormality. There is atrophy and small-vessel   ischemic change      Scattered levels of degenerative change in the cervical spine. No acute   osseous abnormality      Results discussed with Dr. Davian Talavera. Maricruz Mckeon MD at 9:46 pm on   5/28/2019         CT Head WO Contrast   Final Result   Bilateral pulmonary artery emboli. There is underlying emphysema and   atelectasis at the lung bases. Dislocation at the glenohumeral joint on the right. No acute intracranial abnormality.   There is atrophy and small-vessel   ischemic change      Scattered levels of HISTORY: head injury TECHNOLOGIST PROVIDED HISTORY: If patient is on cardiac monitor and/or pulse ox, they may be taken off cardiac monitor and pulse ox, left on O2 if currently on. All monitors reattached when patient returns to room. Has a \"code stroke\" or \"stroke alert\" been called? ->No Ordering Physician Provided Reason for Exam: Fall (Pt from [facility] via [facility] for c/o mechanical fall from standing, pain to right shoulder, RUE. Denies hitting head, denies LoC. PMS, uncable to move right arm. Pt alert, oriented x4. ) Acuity: Acute Type of Exam: Initial FINDINGS: Head: BRAIN/VENTRICLES: Ventricles are midline in position. Patient is asymmetrically positioned within the scanner. Mild periventricular hypodensity is seen. Patchy and confluent hypodensity is seen throughout the frontal and parietal white matter bilaterally, left greater than right. No obvious hemorrhage or mass. ORBITS: The visualized portion of the orbits demonstrate no acute abnormality. SINUSES: Mild mucosal thickening is seen in the ethmoid air cells. No significant mastoid opacification. There is bowing and spurring of the nasal septum SOFT TISSUES/SKULL:  No acute abnormality of the visualized skull or soft tissues. Cervical spine: There is streak artifact from dental amalgam. Spurring is seen at the tip of the dens. Bony ring of C1 appears intact. Vertebral body is appear intact. Scattered levels of facet arthropathy are seen. Scattered levels of disc space narrowing and disc space spur formation are seen. No acute fracture is noted. .  Prevertebral soft tissues appear normal.  Scattered small lymph nodes are seen in the neck. Atherosclerotic vascular calcification is seen. Emphysematous changes are seen in the lung apices. Please see chest CT report. Chest CT: Thyroid gland appears normal.  Atherosclerotic change seen in aorta. No intimal flap seen in aorta.   Scattered small mediastinal and hilar nodes are seen Thrombus is seen in the distal left main pulmonary artery extending into the left upper and left lower lobe branches Thrombus is seen in the distal right main pulmonary artery extending into the right upper right middle and right lower lobe branches. Respiratory motion artifact limits evaluation of fine pulmonary parenchymal change. Calcified granuloma seen in the right upper lobe. De pendant opacity is seen at the lung bases, likely atelectasis. There is underlying emphysema. Scattered calcified granuloma are seen There are changes from prior partial hepatic resection incompletely visualized in the upper abdomen. Calcified granuloma seen in the spleen. Low attenuation is seen liver. Left kidney not seen. Scattered hypodense nodules are seen throughout the right kidney, incompletely evaluated, most likely cyst Spurring is seen in the thoracic spine. No obvious displaced rib fracture There is dislocation seen at the right glenohumeral joint. .  No obvious fracture     Bilateral pulmonary artery emboli. There is underlying emphysema and atelectasis at the lung bases. Dislocation at the glenohumeral joint on the right. No acute intracranial abnormality. There is atrophy and small-vessel ischemic change Scattered levels of degenerative change in the cervical spine. No acute osseous abnormality Results discussed with Dr. Everardo Pink.  Genie Gore MD at 9:46 pm on 5/28/2019     Ct Chest W Contrast    Result Date: 5/28/2019  EXAMINATION: CT OF THE HEAD WITHOUT CONTRAST; CT OF THE CHEST WITH CONTRAST; CT OF THE CERVICAL SPINE WITHOUT CONTRAST  5/28/2019 8:53 pm TECHNIQUE: CT of the head was performed without the administration of intravenous contrast. Dose modulation, iterative reconstruction, and/or weight based adjustment of the mA/kV was utilized to reduce the radiation dose to as low as reasonably achievable.; CT of the chest was performed with the administration of intravenous contrast. Multiplanar reformatted images are provided for review. Dose modulation, iterative reconstruction, and/or weight based adjustment of the mA/kV was utilized to reduce the radiation dose to as low as reasonably achievable.; CT of the cervical spine was performed without the administration of intravenous contrast. Multiplanar reformatted images are provided for review. Dose modulation, iterative reconstruction, and/or weight based adjustment of the mA/kV was utilized to reduce the radiation dose to as low as reasonably achievable. COMPARISON: None. HISTORY: ORDERING SYSTEM PROVIDED HISTORY: head injury TECHNOLOGIST PROVIDED HISTORY: If patient is on cardiac monitor and/or pulse ox, they may be taken off cardiac monitor and pulse ox, left on O2 if currently on. All monitors reattached when patient returns to room. Has a \"code stroke\" or \"stroke alert\" been called? ->No Ordering Physician Provided Reason for Exam: Fall (Pt from [facility] via [facility] for c/o mechanical fall from standing, pain to right shoulder, RUE. Denies hitting head, denies LoC. PMS, uncable to move right arm. Pt alert, oriented x4. ) Acuity: Acute Type of Exam: Initial FINDINGS: Head: BRAIN/VENTRICLES: Ventricles are midline in position. Patient is asymmetrically positioned within the scanner. Mild periventricular hypodensity is seen. Patchy and confluent hypodensity is seen throughout the frontal and parietal white matter bilaterally, left greater than right. No obvious hemorrhage or mass. ORBITS: The visualized portion of the orbits demonstrate no acute abnormality. SINUSES: Mild mucosal thickening is seen in the ethmoid air cells. No significant mastoid opacification. There is bowing and spurring of the nasal septum SOFT TISSUES/SKULL:  No acute abnormality of the visualized skull or soft tissues. Cervical spine: There is streak artifact from dental amalgam. Spurring is seen at the tip of the dens. Bony ring of C1 appears intact. Vertebral body is appear intact. Scattered levels of facet arthropathy are seen. Scattered levels of disc space narrowing and disc space spur formation are seen. No acute fracture is noted. .  Prevertebral soft tissues appear normal.  Scattered small lymph nodes are seen in the neck. Atherosclerotic vascular calcification is seen. Emphysematous changes are seen in the lung apices. Please see chest CT report. Chest CT: Thyroid gland appears normal.  Atherosclerotic change seen in aorta. No intimal flap seen in aorta. Scattered small mediastinal and hilar nodes are seen Thrombus is seen in the distal left main pulmonary artery extending into the left upper and left lower lobe branches Thrombus is seen in the distal right main pulmonary artery extending into the right upper right middle and right lower lobe branches. Respiratory motion artifact limits evaluation of fine pulmonary parenchymal change. Calcified granuloma seen in the right upper lobe. De pendant opacity is seen at the lung bases, likely atelectasis. There is underlying emphysema. Scattered calcified granuloma are seen There are changes from prior partial hepatic resection incompletely visualized in the upper abdomen. Calcified granuloma seen in the spleen. Low attenuation is seen liver. Left kidney not seen. Scattered hypodense nodules are seen throughout the right kidney, incompletely evaluated, most likely cyst Spurring is seen in the thoracic spine. No obvious displaced rib fracture There is dislocation seen at the right glenohumeral joint. .  No obvious fracture     Bilateral pulmonary artery emboli. There is underlying emphysema and atelectasis at the lung bases. Dislocation at the glenohumeral joint on the right. No acute intracranial abnormality. There is atrophy and small-vessel ischemic change Scattered levels of degenerative change in the cervical spine. No acute osseous abnormality Results discussed with Dr. Gonsalo Matta.  Etelvina Bolden MD at 9:46 pm on 5/28/2019 Ct Cervical Spine Wo Contrast    Result Date: 5/28/2019  EXAMINATION: CT OF THE HEAD WITHOUT CONTRAST; CT OF THE CHEST WITH CONTRAST; CT OF THE CERVICAL SPINE WITHOUT CONTRAST  5/28/2019 8:53 pm TECHNIQUE: CT of the head was performed without the administration of intravenous contrast. Dose modulation, iterative reconstruction, and/or weight based adjustment of the mA/kV was utilized to reduce the radiation dose to as low as reasonably achievable.; CT of the chest was performed with the administration of intravenous contrast. Multiplanar reformatted images are provided for review. Dose modulation, iterative reconstruction, and/or weight based adjustment of the mA/kV was utilized to reduce the radiation dose to as low as reasonably achievable.; CT of the cervical spine was performed without the administration of intravenous contrast. Multiplanar reformatted images are provided for review. Dose modulation, iterative reconstruction, and/or weight based adjustment of the mA/kV was utilized to reduce the radiation dose to as low as reasonably achievable. COMPARISON: None. HISTORY: ORDERING SYSTEM PROVIDED HISTORY: head injury TECHNOLOGIST PROVIDED HISTORY: If patient is on cardiac monitor and/or pulse ox, they may be taken off cardiac monitor and pulse ox, left on O2 if currently on. All monitors reattached when patient returns to room. Has a \"code stroke\" or \"stroke alert\" been called? ->No Ordering Physician Provided Reason for Exam: Fall (Pt from [facility] via [facility] for c/o mechanical fall from standing, pain to right shoulder, RUE. Denies hitting head, denies LoC. PMS, uncable to move right arm. Pt alert, oriented x4. ) Acuity: Acute Type of Exam: Initial FINDINGS: Head: BRAIN/VENTRICLES: Ventricles are midline in position. Patient is asymmetrically positioned within the scanner. Mild periventricular hypodensity is seen.  Patchy and confluent hypodensity is seen throughout the frontal and parietal white matter bilaterally, left greater than right. No obvious hemorrhage or mass. ORBITS: The visualized portion of the orbits demonstrate no acute abnormality. SINUSES: Mild mucosal thickening is seen in the ethmoid air cells. No significant mastoid opacification. There is bowing and spurring of the nasal septum SOFT TISSUES/SKULL:  No acute abnormality of the visualized skull or soft tissues. Cervical spine: There is streak artifact from dental amalgam. Spurring is seen at the tip of the dens. Bony ring of C1 appears intact. Vertebral body is appear intact. Scattered levels of facet arthropathy are seen. Scattered levels of disc space narrowing and disc space spur formation are seen. No acute fracture is noted. .  Prevertebral soft tissues appear normal.  Scattered small lymph nodes are seen in the neck. Atherosclerotic vascular calcification is seen. Emphysematous changes are seen in the lung apices. Please see chest CT report. Chest CT: Thyroid gland appears normal.  Atherosclerotic change seen in aorta. No intimal flap seen in aorta. Scattered small mediastinal and hilar nodes are seen Thrombus is seen in the distal left main pulmonary artery extending into the left upper and left lower lobe branches Thrombus is seen in the distal right main pulmonary artery extending into the right upper right middle and right lower lobe branches. Respiratory motion artifact limits evaluation of fine pulmonary parenchymal change. Calcified granuloma seen in the right upper lobe. De pendant opacity is seen at the lung bases, likely atelectasis. There is underlying emphysema. Scattered calcified granuloma are seen There are changes from prior partial hepatic resection incompletely visualized in the upper abdomen. Calcified granuloma seen in the spleen. Low attenuation is seen liver. Left kidney not seen.   Scattered hypodense nodules are seen throughout the right kidney, incompletely evaluated, most likely cyst Spurring (MILK OF MAGNESIA) 400 MG/5ML suspension 30 mL (has no administration in time range)   ondansetron (ZOFRAN) injection 4 mg (has no administration in time range)   heparin (porcine) injection 5,080 Units (has no administration in time range)   heparin (porcine) injection 5,080 Units (has no administration in time range)   heparin (porcine) injection 2,540 Units (has no administration in time range)   heparin 25,000 units in dextrose 5% 250 mL infusion (has no administration in time range)   propofol 1000 MG/100ML injection (has no administration in time range)   propofol 200 MG/20ML injection (has no administration in time range)   sodium chloride 0.9 % infusion (has no administration in time range)   morphine injection 4 mg (4 mg Intravenous Given 5/28/19 2031)   0.9 % sodium chloride bolus (0 mLs Intravenous Stopped 5/28/19 2307)   iopamidol (ISOVUE-370) 76 % injection 75 mL (75 mLs Intravenous Given 5/28/19 2059)   heparin (porcine) injection 5,080 Units (5,080 Units Intravenous Given 5/28/19 2259)       Briefly, this is a 81year old female that presents to the emergency department complaining of right arm/shoulder pain with limited range of joint motion. The patient reports that she had a mechanical fall while walking through her home. States she did not hit her head or lose consciousness. No vomiting. She does remember the entire event. Upon arrival, it is noted that the patient is hypoxic, she denies any shortness of breath, history of supplemental oxygen use, or chest pain. She is uncertain how long her oxygen saturation has been low. I did order a CT of her chest with contrast for concern of hypoxia with injury, considering trauma. Impression:    Bilateral pulmonary artery emboli. Franklin Skeens is underlying emphysema and  atelectasis at the lung bases. Dislocation at the glenohumeral joint on the right.     No acute intracranial abnormality. Franklin Skeens is atrophy and small-vessel  ischemic change    Scattered levels of degenerative change in the cervical spine.  No acute  osseous abnormality        Troponin is negative. CMP shows decreased renal function with a GFR 47, normal creatinine. BNP 7136. CBC is unremarkable. INR 1.10. CT head and CT cervical spine without contrast were also completed with fall. There is no acute intracranial abnormality. Scattered levels of degenerative changes in the cervical spine without acute osseous abnormality. Procedural sedation was completed for reduction of the right shoulder after signed consent and timeout were completed with attending physician at bedside. Please see procedure notes for both. High-dose heparin was started for treatment of bilateral pulmonary embolism with hypoxia and respiratory failure. Patient will be admitted, Dr. Arlene Riggins was consulted and accepted this patient. Dr. Shaila Barrera was consulted regarding dislocation of the right shoulder that was unsuccessfully reduced. He does requested that we try again. The patient will be sedated a second time and attempted closed reduction with procedural sedation of the right shoulder. Successful reduction. FINAL IMPRESSION      1. Bilateral pulmonary embolism ([address]    2. Acute respiratory failure with hypoxia (HCC)    3. Anterior shoulder dislocation, right, initial encounter    4.  Closed head injury, initial encounter          DISPOSITION/PLAN   DISPOSITION Admitted 05/28/2019 10:56:20 PM      PATIENT REFERREDTO:  Vanita Hanks  [facility]            DISCHARGE MEDICATIONS:  Current Discharge Medication List          DISCONTINUED MEDICATIONS:  Current Discharge Medication List                 (Please note that portions ofthis note were completed with a voice recognition program.  Efforts were made to edit the dictations but occasionally words are mis-transcribed.)    KEREN Ventura - CASEY (electronically

## 2019-05-30 PROBLEM — I10 ESSENTIAL HYPERTENSION: Status: ACTIVE | Noted: 2019-05-30

## 2019-05-30 PROBLEM — S43.004A DISLOCATION OF RIGHT SHOULDER JOINT: Status: ACTIVE | Noted: 2019-05-30

## 2019-05-30 PROBLEM — Z85.038 HISTORY OF COLON CANCER: Status: ACTIVE | Noted: 2019-05-30

## 2019-05-30 PROBLEM — E11.9 CONTROLLED TYPE 2 DIABETES MELLITUS, WITHOUT LONG-TERM CURRENT USE OF INSULIN (HCC): Status: ACTIVE | Noted: 2019-05-30

## 2019-05-30 LAB
APTT: 91.6 SEC (ref 26–36)
GLUCOSE BLD-MCNC: 123 MG/DL (ref 70–99)
GLUCOSE BLD-MCNC: 153 MG/DL (ref 70–99)
GLUCOSE BLD-MCNC: 160 MG/DL (ref 70–99)
GLUCOSE BLD-MCNC: 162 MG/DL (ref 70–99)
LV EF: 55 %
LVEF MODALITY: NORMAL
PERFORMED ON: ABNORMAL
PLATELET # BLD: 145 K/UL (ref 135–450)

## 2019-05-30 PROCEDURE — 85730 THROMBOPLASTIN TIME PARTIAL: CPT

## 2019-05-30 PROCEDURE — 6370000000 HC RX 637 (ALT 250 FOR IP): Performed by: INTERNAL MEDICINE

## 2019-05-30 PROCEDURE — 93306 TTE W/DOPPLER COMPLETE: CPT

## 2019-05-30 PROCEDURE — 1200000000 HC SEMI PRIVATE

## 2019-05-30 PROCEDURE — 2580000003 HC RX 258: Performed by: INTERNAL MEDICINE

## 2019-05-30 PROCEDURE — 85049 AUTOMATED PLATELET COUNT: CPT

## 2019-05-30 PROCEDURE — 99231 SBSQ HOSP IP/OBS SF/LOW 25: CPT | Performed by: NURSE PRACTITIONER

## 2019-05-30 PROCEDURE — 36415 COLL VENOUS BLD VENIPUNCTURE: CPT

## 2019-05-30 RX ORDER — DEXTROSE MONOHYDRATE 50 MG/ML
100 INJECTION, SOLUTION INTRAVENOUS PRN
Status: DISCONTINUED | OUTPATIENT
Start: 2019-05-30 | End: 2019-06-01 | Stop reason: HOSPADM

## 2019-05-30 RX ORDER — NICOTINE POLACRILEX 4 MG
15 LOZENGE BUCCAL PRN
Status: DISCONTINUED | OUTPATIENT
Start: 2019-05-30 | End: 2019-06-01 | Stop reason: HOSPADM

## 2019-05-30 RX ORDER — DEXTROSE MONOHYDRATE 25 G/50ML
12.5 INJECTION, SOLUTION INTRAVENOUS PRN
Status: DISCONTINUED | OUTPATIENT
Start: 2019-05-30 | End: 2019-06-01 | Stop reason: HOSPADM

## 2019-05-30 RX ORDER — FUROSEMIDE 20 MG/1
20 TABLET ORAL DAILY
Qty: 60 TABLET | Refills: 3 | Status: SHIPPED | OUTPATIENT
Start: 2019-05-30

## 2019-05-30 RX ADMIN — RIVAROXABAN 20 MG: 20 TABLET, FILM COATED ORAL at 17:14

## 2019-05-30 RX ADMIN — INSULIN LISPRO 2 UNITS: 100 INJECTION, SOLUTION INTRAVENOUS; SUBCUTANEOUS at 17:15

## 2019-05-30 RX ADMIN — Medication 10 ML: at 09:04

## 2019-05-30 RX ADMIN — INSULIN LISPRO 1 UNITS: 100 INJECTION, SOLUTION INTRAVENOUS; SUBCUTANEOUS at 22:46

## 2019-05-30 RX ADMIN — INSULIN LISPRO 2 UNITS: 100 INJECTION, SOLUTION INTRAVENOUS; SUBCUTANEOUS at 12:23

## 2019-05-30 ASSESSMENT — PAIN SCALES - GENERAL
PAINLEVEL_OUTOF10: 5
PAINLEVEL_OUTOF10: 0

## 2019-05-30 ASSESSMENT — PAIN DESCRIPTION - DESCRIPTORS: DESCRIPTORS: DULL;ACHING;DISCOMFORT

## 2019-05-30 ASSESSMENT — PAIN DESCRIPTION - LOCATION: LOCATION: SHOULDER;GENERALIZED

## 2019-05-30 ASSESSMENT — PAIN DESCRIPTION - ORIENTATION: ORIENTATION: LEFT

## 2019-05-30 ASSESSMENT — PAIN DESCRIPTION - PAIN TYPE: TYPE: ACUTE PAIN

## 2019-05-30 NOTE — H&P
[place] [address]                     [address] [facility], [address]                              HISTORY AND PHYSICAL    PATIENT NAME: Magdalena Wang                       :        1935  MED REC NO:   [id number]                          ROOM:       4464  ACCOUNT NO:   [de-identified]                           ADMIT DATE: 2019  PROVIDER:     Keerthi Manzo MD    HISTORY OF PRESENT ILLNESS:  The patient is an 78-year-old white woman  came to the emergency room with history of multiple vague symptoms, one  of them being shortness of breath. The patient actually had a  mechanical fall as she lived at [facility]. She  claims she is independent, but she has significant disorientation and  actually dementia. In fact, she states while she was in the hospital  that she is still at long term care facility, but she was actually in  the hospital.  There is no head injury. No ENT bleeding. She most of  the time sounds like she is oriented x3. PAST MEDICAL HISTORY:  Pertinent for hypertension, type 2 diabetes  mellitus, colon cancer, hyperlipidemia, memory loss, fall, anemia, gout,  liver disease, rosacea, and vitamin D deficiency. PAST SURGICAL HISTORY:  Pertinent for cataract removal, cholecystectomy,  D and C of the uterus, liver resection, hip arthroplasty, total  nephrectomy, and colectomy. FAMILY HISTORY:  Both her parents are diseased of natural causes. No  further family history is available. SOCIAL HISTORY:  She is a  for 20 years. She never had any  children. She was always a nonsmoker, always a nondrinker. She used to  work [employer]. She definitely had significant memory loss. There is no  history of illicit substance abuse. She does have two legal guardians,  I think they are her siblings. CURRENT MEDICATIONS:  Xarelto, multivitamin, Prinivil, Advil, Lasix,  Zetia, and vitamin D3.     ALLERGIES:  She is allergic to HYDROCHLOROTHIAZIDE, IODINATED DIAGNOSTIC  AGENTS, ALDACTONE, and TOBRAMYCIN. REVIEW OF SYSTEMS:  Pertinent for no history of loss of consciousness,  no speech disturbance, no vision disturbance, no seizure activity, no  dysphagia, no angina pectoris. She does have significant exertional  shortness of breath. No resting shortness of breath. Denies any chest  pain. No cough or hemoptysis. No abdominal pain. No hematemesis. No  melena. No genitourinary complaints. No intermittent claudication. She uses a walker. She does have chronic musculoskeletal pain,  nonspecific. PHYSICAL EXAMINATION:  GENERAL:  She is alert, awake, and oriented x2. VITAL SIGNS:  Temperature is 98.5, blood pressure 112/52, respiration  18, heart rate 88. HEENT:  Oral mucosa is dry. SKIN:  Warm and dry. NECK:  Supple. Faint carotid bruits. No jugular venous distention, no  lymphadenopathy. LUNGS:  Vesicular breath sounds. Fairly clear to auscultation except  two basilar crackles on the right. HEART:  Regular rate and rhythm, S1 and S2, 1/6 systolic ejection  murmur. No gallop rhythm. ABDOMEN:  Soft and nontender. Bowel sounds present. EXTREMITIES:  Show trace edema. NEUROLOGIC:  There are no acute focal deficits. She appears to be  moving all the four extremities against gravity without any acute focal  sensory motor deficits. Babinski is absent. LABORATORY EVALUATION:  Shows sodium of 142, potassium 4.1, chloride  103, CO2 of 24, BUN 23, and creatinine 1.1. Anion gap is 15. GFR 47. Blood sugar 215, calcium 9.4, proBNP level 7136, troponin less than  0.01. Liver panel essentially within normal limits. White blood cell  count is 16.4, hemoglobin and hematocrit is 13.5 and 41.6. Platelet  count is 997. Urinalysis negative for acute UTI. CT scan of the chest with contrast shows bilateral emboli with  underlying emphysema and atelectasis at the lung bases.   Dislocation at  the glenohumeral joint on the right.  No acute intracranial  abnormalities that is atrophy of the small vessel ischemic changes. No  acute osseous abnormality. Glenohumeral disarticulation was reduced in  the emergency room. ASSESSMENT:  1. Acute pulmonary embolism. 2.  Acute anterior shoulder dislocation. 3.  Hypertension. 4.  Status post falls. 5.  Cognitive deficits. 6.  Hyperlipidemia. 7.  Hypertension. 8.  Type 2 diabetes mellitus, controlled with diet. 9.  History of colon cancer. 10.  Gout. PLAN:  1. Get her admitted. 2.  Put her on heparin drip. 3.  Parenteral pain relief.         Jaron Reynolds MD    D: 05/30/2019 7:38:11       T: 05/30/2019 10:53:31     [id number]  Job#: [id number]     Doc#: [id number]    CC:  Yolanda Smith

## 2019-05-30 NOTE — PLAN OF CARE
Patient's pain level will decrease with the administration of PRN pain medication when needed. Patient will remain free from falls and physical injury. Patient will show no signs or symptoms of venous thromboembolism. Mobility will improve to maximum level.

## 2019-05-30 NOTE — PROGRESS NOTES
glucagon (rDNA), dextrose, morphine, sodium chloride flush, magnesium hydroxide, ondansetron, heparin (porcine), heparin (porcine)    Allergies: Allergies   Allergen Reactions    Hydrochlorothiazide W-Triamterene Rash    Iodinated Diagnostic Agents Rash     IVP Dye caused a rash    Spironolactone Rash    Tobramycin Sulfate Swelling and Rash     Eye ointment caused redness & swelling       Review of Systems:  Constitutional: Negative for fever, chills, fatigue. Skin:  Negative for pruritis, rash  Eyes: Negative for photophobia and visual disturbance. ENT:  Negative for rhinorrhea, epistaxis, sore throat  Respiratory:  Negative for cough and shortness of breath. Cardiovascular: Negative for chest pain. Gastrointestinal: Negative for nausea, vomiting, diarrhea. Genitourinary: Negative for dysuria and difficulty urinating. Neurological: Negative for confusion, dysarthria, tremors, seizures. Psychiatric:  Negative for depression or anxiety  Musculoskeletal:  Negative for pain/numbness in RIGHT shoulder since reduction. Objective:  Vitals:    05/30/19 0745   BP: 103/64   Pulse: 87   Resp: 18   Temp: 97.5 °F (36.4 °C)   SpO2: 100%      Physical Examination:  GENERAL: No apparent distress, well-nourished  SKIN:  Warm and dry  EYES: Nonicteric. ENT: Mucous membranes moist  HEAD: Normocephalic, atraumatic  RESPIRATORY: Resp easy and unlabored  CARDIOVASCULAR: Regular rate and rhythm  GI: Abdomen soft, nontender  NEURO: Awake and alert. No speech defect  PSYCHIATRIC: Appropriate affect; not agitated  MUSCULOSKELETAL:  RIGHT shoulder  Inspection: Skin intact w/o lesion, laceration, erythema or ecchymosis. Palpation: nontender . Motor: NVI in median, ulnar, radial nerve distributions; extends/flexes wrist.  Sensation: normal in axillary distribution. Vascular:  Intact radial pulse.     Labs reviewed:  Recent Labs     05/28/19 2015 05/29/19  0433 05/30/19  0452   WBC 10.9 16.4*  --    HGB 15.0 13.5 --    HCT 45.3 41.6  --     189 145     Recent Labs     05/28/19 2017      K 4.1      CO2 24   BUN 23*   CREATININE 1.1   GLUCOSE 197*   CALCIUM 9.4     Recent Labs     05/28/19 2015   INR 1.10   PROTIME 12.5       Lab Results   Component Value Date    COLORU YELLOW 05/29/2019    CLARITYU Clear 05/29/2019    PHUR 5.5 05/29/2019    GLUCOSEU Negative 05/29/2019    BLOODU Negative 05/29/2019    LEUKOCYTESUR Negative 05/29/2019    BILIRUBINUR Negative 05/29/2019    UROBILINOGEN 0.2 05/29/2019     Imaging:  XR SHOULDER RIGHT (MIN 2 VIEWS)   Final Result   Status post reduction of previously demonstrated right glenohumeral   dislocation. XR SHOULDER RIGHT (MIN 2 VIEWS)   Final Result   Shoulder remains dislocated. CT CHEST W CONTRAST   Final Result   Bilateral pulmonary artery emboli. There is underlying emphysema and   atelectasis at the lung bases. Dislocation at the glenohumeral joint on the right. No acute intracranial abnormality. There is atrophy and small-vessel   ischemic change      Scattered levels of degenerative change in the cervical spine. No acute   osseous abnormality      Results discussed with Dr. Juan C Bruno. Narcisa Benites MD at 9:46 pm on   5/28/2019         CT CERVICAL SPINE WO CONTRAST   Final Result   Bilateral pulmonary artery emboli. There is underlying emphysema and   atelectasis at the lung bases. Dislocation at the glenohumeral joint on the right. No acute intracranial abnormality. There is atrophy and small-vessel   ischemic change      Scattered levels of degenerative change in the cervical spine. No acute   osseous abnormality      Results discussed with Dr. Juan C Bruno. Narcisa Benites MD at 9:46 pm on   5/28/2019         CT Head WO Contrast   Final Result   Bilateral pulmonary artery emboli. There is underlying emphysema and   atelectasis at the lung bases. Dislocation at the glenohumeral joint on the right.       No acute intracranial abnormality. There is atrophy and small-vessel   ischemic change      Scattered levels of degenerative change in the cervical spine. No acute   osseous abnormality      Results discussed with Dr. Lord Mario. Antoine Dee MD at 9:46 pm on   5/28/2019         XR HUMERUS RIGHT (MIN 2 VIEWS)   Final Result   Anterior inferior dislocation of the humerus           IMPRESSION:  RIGHT shoulder anterior dislocation  Active Problems:    Pulmonary embolism, bilateral (HCC)    Controlled type 2 diabetes mellitus, without long-term current use of insulin (HCC)    Essential hypertension    Dislocation of right shoulder joint    History of colon cancer  Resolved Problems:    * No resolved hospital problems. *    RECOMMENDATIONS:  Maintain sling & swathe x 2 weeks; reinforced today. May remove 3x day for elbow/wrist ROM with NO external rotation of shoulder. No pushing, pulling, lifting with RIGHT UE. Pain control per primary team.  PT/OT (ordered) to determine needs upon discharge. F U with Dr. Johnna Dalton in 2 weeks; call [phone] for appt. Patient can d/c from ortho perspective once medically ready.      Patient does not use tobacco products     KEREN White-CNP  5/30/2019  9:39 AM

## 2019-05-30 NOTE — PLAN OF CARE
Pt will remain free from accidental injury and falls this shift. Fall precautions in place, bed alarm on, pt instructed to call when getting OOB. Will continue to monitor.

## 2019-05-30 NOTE — DISCHARGE INSTR - COC
Continuity of Care Form    Patient Name: Lorraine Bustillo   :  1935  MRN:  [id number]    Admit date:  2019  Discharge date:  2019    Code Status Order: Full Code   Advance Directives:   Advance Care Flowsheet Documentation     Date/Time Healthcare Directive Type of Healthcare Directive Copy in [address] Agent's Name Healthcare Agent's Phone Number    [phone]  No, patient does not have an advance directive for healthcare treatment -- -- -- -- --          Admitting Physician:  Silvano Rangel MD  PCP: Rajinder Hernandez    Discharging Nurse: [facility] Unit/Room#: [id number]  Discharging Unit Phone Number: [phone]    Emergency Contact:   Extended Emergency Contact Information  Primary Emergency Contact: Sonya Vela  Home Phone: [phone]  Relation: Brother/Sister  Secondary Emergency Contact: Clarise Officer  Relation: Brother/Sister    Past Surgical History:  Past Surgical History:   Procedure Laterality Date    CATARACT REMOVAL Bilateral     CHOLECYSTECTOMY      COLECTOMY      DILATION AND CURETTAGE OF UTERUS      HIP ARTHROPLASTY Left 2012    LIVER RESECTION Right     TOTAL NEPHRECTOMY Left     d/t hydronephrosis       Immunization History:   Immunization History   Administered Date(s) Administered    Influenza Vaccine, unspecified formulation 10/12/2015, 2018    Influenza, Triv, inactivated, subunit, adjuvanted, IM (Fluad 65 yrs and older) 10/13/2018    Pneumococcal 13-valent Conjugate (Meavjwd94) 2015    Pneumococcal Polysaccharide (Cavswumak49) 2000    Td, unspecified formulation 2001, 2010    Tdap (Boostrix, Adacel) 2019    Zoster Live (Zostavax) 2008       Active Problems:  Patient Active Problem List   Diagnosis Code    Pulmonary embolism, bilateral ([facility] I26.99    Controlled type 2 diabetes mellitus, without long-term current use of insulin ([facility] E11.9    Essential hypertension I10    Dislocation of right shoulder joint S43.004A    History of colon cancer Z85.038       Isolation/Infection:   Isolation          No Isolation            Nurse Assessment:  Last Vital Signs: /64   Pulse 87   Temp 97.5 °F (36.4 °C) (Oral)   Resp 18   Ht 5' 5\" (1.651 m)   Wt 184 lb 9.6 oz (83.7 kg)   LMP  (LMP Unknown) Comment: post-menopausal  SpO2 100%   BMI 30.72 kg/m²     Last documented pain score (0-10 scale): Pain Level: 0  Last Weight:   Wt Readings from Last 1 Encounters:   05/29/19 184 lb 9.6 oz (83.7 kg)     Mental Status:  disoriented and alert    IV Access:  - None    Nursing Mobility/ADLs:  Walking   Assisted  Transfer  Assisted  Bathing  Assisted  Dressing  Assisted  Toileting  Assisted  Feeding  Independent  Med Admin  Assisted  Med Delivery   whole    Wound Care Documentation and Therapy:        Elimination:  Continence:   · Bowel: Yes  · Bladder: Yes  Urinary Catheter: None   Colostomy/Ileostomy/Ileal Conduit: No       Date of Last BM: 5/31/19    Intake/Output Summary (Last 24 hours) at 5/30/2019 1046  Last data filed at 5/30/2019 0905  Gross per 24 hour   Intake 813 ml   Output --   Net 813 ml     I/O last 3 completed shifts: In: 863 [P.O.:480; I.V.:383]  Out: -     Safety Concerns:     History of Falls (last 30 days) and At Risk for Falls    Impairments/Disabilities:      None    Nutrition Therapy:  Current Nutrition Therapy:   - Oral Diet:  Carb Control 5 carbs/meal (2000kcals/day)    Routes of Feeding: Oral  Liquids: No Restrictions  Daily Fluid Restriction: no  Last Modified Barium Swallow with Video (Video Swallowing Test): not done    Treatments at the Time of Hospital Discharge:   Respiratory Treatments:   Oxygen Therapy:  is not on home oxygen therapy. Ventilator:    - No ventilator support    Rehab Therapies: Physical Therapy and Occupational Therapy  Weight Bearing Status/Restrictions: NO weight bearing through the right upper extremity.   Other Medical Equipment (for information only, NOT a DME order):  sling  Other Treatments: Remain in sling and swath. May remove TID to was underarm and to work on gentle ROM of elbow, wrist and hand. Follow up: Follow up Dr. Lauren Sullivan in 2 weeks   Call [phone] for appointment. Patient's personal belongings (please select all that are sent with patient):  Glasses    RN SIGNATURE:  Electronically signed by Rhina Price RN on 5/31/19 at 3:20 PM    CASE MANAGEMENT/SOCIAL WORK SECTION    Inpatient Status Date:     5/28/2019    Readmission Risk Assessment Score:  Readmission Risk              Risk of Unplanned Readmission:        7           Discharging to Facility/ Agency   · Name:    [facility]  · Address:  · Phone:    [phone]  · Fax:    [phone]      / signature: Electronically signed by Danni Haddad RN on 5/31/19 at 1:16 PM    PHYSICIAN SECTION    Prognosis: Fair    Condition at Discharge: Stable    Rehab Potential (if transferring to Rehab): Fair    Recommended Labs or Other Treatments After Discharge: PT OT     Physician Certification: I certify the above information and transfer of Aleyda Kimble  is necessary for the continuing treatment of the diagnosis listed and that she requires [facility] for less 30 days.      Update Admission H&P: No change in H&P    PHYSICIAN SIGNATURE:  Electronically signed by Kaley Savage MD on 5/31/19 at 3:01 PM

## 2019-05-30 NOTE — PROGRESS NOTES
Patient Active Problem List   Diagnosis    Pulmonary embolism, bilateral ([address]    Controlled type 2 diabetes mellitus, without long-term current use of insulin ([address]    Essential hypertension    Dislocation of right shoulder joint    History of colon cancer   H&P dictated

## 2019-05-31 LAB
APTT: 34.3 SEC (ref 26–36)
APTT: 36.9 SEC (ref 26–36)
GLUCOSE BLD-MCNC: 106 MG/DL (ref 70–99)
GLUCOSE BLD-MCNC: 131 MG/DL (ref 70–99)
GLUCOSE BLD-MCNC: 155 MG/DL (ref 70–99)
GLUCOSE BLD-MCNC: 178 MG/DL (ref 70–99)
PERFORMED ON: ABNORMAL

## 2019-05-31 PROCEDURE — 93306 TTE W/DOPPLER COMPLETE: CPT

## 2019-05-31 PROCEDURE — 36415 COLL VENOUS BLD VENIPUNCTURE: CPT

## 2019-05-31 PROCEDURE — 97165 OT EVAL LOW COMPLEX 30 MIN: CPT

## 2019-05-31 PROCEDURE — 97535 SELF CARE MNGMENT TRAINING: CPT

## 2019-05-31 PROCEDURE — 6370000000 HC RX 637 (ALT 250 FOR IP): Performed by: INTERNAL MEDICINE

## 2019-05-31 PROCEDURE — 99231 SBSQ HOSP IP/OBS SF/LOW 25: CPT | Performed by: NURSE PRACTITIONER

## 2019-05-31 PROCEDURE — 97161 PT EVAL LOW COMPLEX 20 MIN: CPT

## 2019-05-31 PROCEDURE — 97116 GAIT TRAINING THERAPY: CPT

## 2019-05-31 PROCEDURE — 97530 THERAPEUTIC ACTIVITIES: CPT

## 2019-05-31 PROCEDURE — 94761 N-INVAS EAR/PLS OXIMETRY MLT: CPT

## 2019-05-31 PROCEDURE — 85730 THROMBOPLASTIN TIME PARTIAL: CPT

## 2019-05-31 PROCEDURE — 1200000000 HC SEMI PRIVATE

## 2019-05-31 PROCEDURE — 2580000003 HC RX 258: Performed by: INTERNAL MEDICINE

## 2019-05-31 PROCEDURE — 2700000000 HC OXYGEN THERAPY PER DAY

## 2019-05-31 RX ORDER — HALOPERIDOL 5 MG/ML
5 INJECTION INTRAMUSCULAR EVERY 6 HOURS PRN
Status: DISCONTINUED | OUTPATIENT
Start: 2019-05-31 | End: 2019-06-01 | Stop reason: HOSPADM

## 2019-05-31 RX ADMIN — INSULIN LISPRO 2 UNITS: 100 INJECTION, SOLUTION INTRAVENOUS; SUBCUTANEOUS at 12:51

## 2019-05-31 RX ADMIN — Medication 10 ML: at 20:39

## 2019-05-31 RX ADMIN — Medication 10 ML: at 08:27

## 2019-05-31 RX ADMIN — INSULIN LISPRO 2 UNITS: 100 INJECTION, SOLUTION INTRAVENOUS; SUBCUTANEOUS at 17:39

## 2019-05-31 RX ADMIN — RIVAROXABAN 20 MG: 20 TABLET, FILM COATED ORAL at 17:39

## 2019-05-31 ASSESSMENT — PAIN SCALES - GENERAL
PAINLEVEL_OUTOF10: 0

## 2019-05-31 NOTE — PLAN OF CARE
Problem: Pain:  Goal: Pain level will decrease  Description  Pain level will decrease  5/30/2019 2341 by Patience Walsh RN  Outcome: Ongoing  5/30/2019 1004 by Priyanka Lovett RN  Outcome: Ongoing  Goal: Control of acute pain  Description  Control of acute pain  Outcome: Ongoing  Goal: Control of chronic pain  Description  Control of chronic pain  Outcome: Ongoing     Problem: Falls - Risk of:  Goal: Will remain free from falls  Description  Will remain free from falls  5/30/2019 2341 by Patience Walsh RN  Outcome: Ongoing  5/30/2019 1004 by Priyanka Lovett RN  Outcome: Ongoing  Goal: Absence of physical injury  Description  Absence of physical injury  5/30/2019 2341 by Patience Walsh RN  Outcome: Ongoing  5/30/2019 1004 by Priyanka Lovett RN  Outcome: Ongoing     Problem: Venous Thromboembolism:  Goal: Will show no signs or symptoms of venous thromboembolism  Description  Will show no signs or symptoms of venous thromboembolism  5/30/2019 2341 by Patience Walsh RN  Outcome: Ongoing  5/30/2019 1004 by Priyanka Lovett RN  Outcome: Ongoing  Goal: Absence of signs or symptoms of impaired coagulation  Description  Absence of signs or symptoms of impaired coagulation  Outcome: Ongoing     Problem: Mobility - Impaired:  Goal: Mobility will improve to maximum level  Description  Mobility will improve to maximum level  5/30/2019 2341 by Patience Walsh RN  Outcome: Ongoing  5/30/2019 1004 by Priyanka Lovett RN  Outcome: Ongoing

## 2019-05-31 NOTE — CARE COORDINATION
5/31/2019-   From [facility]. Therapy recommending skill bed available, precert initiated.

## 2019-05-31 NOTE — PROGRESS NOTES
Physical Therapy    Facility/Department: [address] ORTHO/NEURO NURSING  Initial Assessment    NAME: Alondra Siddiqui  : 1935  MRN: [id number]    Date of Service: 2019    Discharge Recommendations: Alondra Siddiqui scored a 15/24 on the AM-PAC short mobility form. Current research shows that an AM-PAC score of 17 or less is typically not associated with a discharge to the patient's home setting. Based on the patients AM-PAC score and their current functional mobility deficits, it is recommended that the patient have 3-5 sessions per week of Physical Therapy at d/c to increase the patients independence. PT Equipment Recommendations  Equipment Needed: No    Assessment   Body structures, Functions, Activity limitations: Decreased functional mobility ; Decreased balance;Decreased endurance;Decreased strength  Assessment: Pt presenting below baseline functional mobility this date. Pt would benefit from acute PT in order to address above listed deficits. Treatment Diagnosis: Impaired balance and endurance impairing functional mobility. Prognosis: Good  Decision Making: Low Complexity  Clinical Presentation: stable  Patient Education: Pt POC, eval and d/c recommendations. Barriers to Learning: none  REQUIRES PT FOLLOW UP: Yes  Activity Tolerance  Activity Tolerance: Patient Tolerated treatment well       Patient Diagnosis(es): The primary encounter diagnosis was Bilateral pulmonary embolism ([address] Diagnoses of Acute respiratory failure with hypoxia ([address] Anterior shoulder dislocation, right, initial encounter, and Closed head injury, initial encounter were also pertinent to this visit. has a past medical history of Anemia, Colon cancer ([address] Diabetes mellitus ([address] Fall, Gout, Hypercholesterolemia, Hypertension, Liver disease, Memory loss, Pulmonary embolism ([address] Rosacea, and Vitamin D deficiency. has a past surgical history that includes Cataract removal (Bilateral, );  Cholecystectomy; Dilation and curettage of uterus; Liver Resection (Right); Hip Arthroplasty (Left, 2012); total nephrectomy (Left); and colectomy. Restrictions  Restrictions/Precautions  Restrictions/Precautions: Weight Bearing  Required Braces or Orthoses?: Yes(sling and swathe RUE x2 weeks)  Upper Extremity Weight Bearing Restrictions  Right Upper Extremity Weight Bearing: Non Weight Bearing  Other: Right shoulder dislocation - dominant arm; no active ROM right shoulder. no ER Right shoulder, OK for ROM hand wrist and elbow with sling removed 3x/day, no pushing/pulling RUE   Position Activity Restriction  Other position/activity restrictions: The pt was admitted with R shoulder pain s/p fall on 5/28, found to have a shoulder dislocation, reduced in ED. Pt was also found to be hypoxic and has B PEs. Heparin started 5/29. Vision/Hearing  Vision: Impaired  Vision Exceptions: Wears glasses at all times  Hearing: Within functional limits     Subjective  General  Chart Reviewed: Yes  Family / Caregiver Present: No  Follows Commands: Within Functional Limits  General Comment  Comments: Pt denying pain at this time. Pt agreeable to PT eval this date. Subjective  Subjective: Pt supine in bed upon arrival.   Pain Screening  Patient Currently in Pain: Denies  Vital Signs  Patient Currently in Pain: Denies       Orientation  Orientation  Overall Orientation Status: Impaired  Orientation Level: Oriented to person;Disoriented to place; Disoriented to time;Disoriented to situation  Social/Functional History  Social/Functional History  Lives With: Alone  Type of Home: Facility(independent living at [facility])  Home Layout: One level  Home Access: Level entry(1 MARYJO over curb to enter )  Bathroom Shower/Tub: Walk-in shower  Bathroom Toilet: Standard  Bathroom Equipment: Built-in shower seat, Grab bars in shower, Grab bars around toilet, Hand-held shower  Bathroom Accessibility: Rajinder Rm: [address] [facility], [address], [address] [facility] Devices  Type of devices: All fall risk precautions in place, Call light within reach, Chair alarm in place, Gait belt, Patient at risk for falls, Left in chair, Nurse notified  Restraints  Initially in place: No      AM-PAC Score  AM-PAC Inpatient Mobility Raw Score : 15  AM-PAC Inpatient T-Scale Score : 39.45  Mobility Inpatient CMS 0-100% Score: 57.7  Mobility Inpatient CMS G-Code Modifier : CK          Goals  Short term goals  Time Frame for Short term goals: d/c  Short term goal 1: Pt will perform all bed mobility with supervision. Short term goal 2: Pt will amb 48' with CGA. Short term goal 3: Pt will negotiate one step with LRAD and CGA to ensure safe amb off curb outside home. Short term goal 4: Pt will perform sit to stand transfers with SBA.   Patient Goals   Patient goals : to return home       Therapy Time   Individual Concurrent Group Co-treatment   Time In 4256         Time Out 1127         Minutes 43              Timed Code Treatment Minutes:   28    Total Treatment Minutes:  [address] [facility], [address] [facility] [id number]

## 2019-05-31 NOTE — PROGRESS NOTES
Shift assessment completed. Patient alert, oriented to self only at this time. Patient has been impulse throughout shift. Has voided twice. Sitting in chair at this time with chair alarm in place. Patient is currently on 3L of oxygen with sats at 98%. Denies pain at this time. POC discussed with patient. The care plan and education has been reviewed and mutually agreed upon with the patient. Chair alarm on. Call light in reach and patient demonstrated how to reach nursing staff. Will monitor.

## 2019-05-31 NOTE — PROGRESS NOTES
Shift assessment complete, see doc flow sheet. VSS. Pt alert, oriented to self only at this time. Able to follow all commands but has poor safety awareness. Sling and swathe remains in place to RUE as ordered. Pt denies any pain or numbness/tingling in RUE. Generalized bruising noted with large bruises to RUE and right hip. Breath sounds diminished bilaterally, pt weaned from 3L O2 to 2L O2 nasal cannula. Safety and fall precautions discussed and mutually agreed upon by the pt. Chair alarm on, call light within reach. No needs at this time, will continue to monitor.

## 2019-05-31 NOTE — PROGRESS NOTES
Patient remains disoriented and unable to re-orient. Patient removed tele and is refusing to leave the tele in place at this time. Call placed to 3T to make monitor aware.

## 2019-05-31 NOTE — PROGRESS NOTES
Pt sitting up to the chair for most of the day and tolerating room air with SpO2 95%. Continues to deny any pain, sling remains in place. Will continue to monitor.

## 2019-05-31 NOTE — PROGRESS NOTES
Occupational Therapy   Occupational Therapy Initial Assessment  Date: 2019   Patient Name: Raul Seo  MRN: [id number]     : 1935    Date of Service: 2019    Discharge Recommendations: Raul Seo scored a 14/24 on the AM-PAC ADL Inpatient form. Current research shows that an AM-PAC score of 17 or less is typically not associated with a discharge to the patient's home setting. Based on the patients AM-PAC score and their current ADL deficits, it is recommended that the patient have 3-5 sessions per week of Occupational Therapy at d/c to increase the patients independence. OT Equipment Recommendations  Equipment Needed: No    Assessment   Performance deficits / Impairments: Decreased functional mobility ; Decreased ADL status; Decreased balance;Decreased cognition;Decreased high-level IADLs  Assessment: Pt is not at baseline level of function and will benefit from skilled OT in order to address the above limitations. Treatment Diagnosis: Decreased functional mobility, ADL/IADL status, cognition, balance related to R shoulder dislocation and bilateral PEs  Prognosis: Good  Decision Making: Low Complexity  Patient Education: Role of OT, OT eval, ROM restrictions, WBing status, functional mobility, transfers, POC, d/c recommendations  REQUIRES OT FOLLOW UP: Yes  Activity Tolerance  Activity Tolerance: Patient Tolerated treatment well  Safety Devices  Safety Devices in place: Yes  Type of devices: All fall risk precautions in place; Patient at risk for falls;Call light within reach; Left in chair;Chair alarm in place;Nurse notified           Patient Diagnosis(es): The primary encounter diagnosis was Bilateral pulmonary embolism ([address] Diagnoses of Acute respiratory failure with hypoxia ([address] Anterior shoulder dislocation, right, initial encounter, and Closed head injury, initial encounter were also pertinent to this visit.      has a past medical history of Anemia, Colon cancer ([address] Diabetes mellitus ([facility] Fall, Gout, Hypercholesterolemia, Hypertension, Liver disease, Memory loss, Pulmonary embolism ([address] Rosacea, and Vitamin D deficiency. has a past surgical history that includes Cataract removal (Bilateral, 2010); Cholecystectomy; Dilation and curettage of uterus; Liver Resection (Right); Hip Arthroplasty (Left, 2012); total nephrectomy (Left); and colectomy. Treatment Diagnosis: Decreased functional mobility, ADL/IADL status, cognition, balance related to R shoulder dislocation and bilateral PEs      Restrictions  Restrictions/Precautions  Restrictions/Precautions: Weight Bearing, Fall Risk(high fall risk)  Required Braces or Orthoses?: Yes(sling and swathe RUE x2 weeks)  Upper Extremity Weight Bearing Restrictions  Right Upper Extremity Weight Bearing: Non Weight Bearing  Other: Right shoulder dislocation - dominant arm; no active ROM right shoulder. no ER Right shoulder, OK for ROM hand wrist and elbow with sling removed 3x/day, no pushing/pulling RUE   Position Activity Restriction  Other position/activity restrictions: The pt was admitted with R shoulder pain s/p fall on 5/28, found to have a shoulder dislocation, reduced in ED. Pt was also found to be hypoxic and has B PEs. Heparin started 5/29. Subjective   General  Chart Reviewed: Yes  Additional Pertinent Hx: DM, memory loss  Family / Caregiver Present: No  Diagnosis: R shoulder dislocation and bilateral pulmonary embolism  Subjective  Subjective: Pt lying supine in bed upon arrival, agreeable to evaluation. Pt disoriented to place, thinking she was in [place].      Social/Functional History  Social/Functional History  Lives With: Alone  Type of Home: Apartment(independent living at [facility])  Home Layout: One level  Home Access: Level entry(1 MARYJO over curb to enter )  Bathroom Shower/Tub: Walk-in shower  Bathroom Toilet: Standard  Bathroom Equipment: Built-in shower seat, Grab bars in shower, Grab bars around toilet, Hand-held shower  Bathroom Accessibility: Accessible  Home Equipment: Janus Green, Reacher, Alert [employer]  ADL Assistance: Independent  Homemaking Assistance: Needs assistance(Cleaning/nursing assistance )  Homemaking Responsibilities: Yes  Ambulation Assistance: Independent(With cane in community)  Transfer Assistance: Independent  Active : Yes  Mode of Transportation: Car  Occupation: Retired  Type of occupation: [employer] employee   Leisure & Hobbies: watch [employer], go fishing   Additional Comments: Pt reports no falls within the last 6 months, but does report falling at least once a year.        Objective   Vision: Impaired  Vision Exceptions: Wears glasses at all times  Hearing: Within functional limits    Orientation  Overall Orientation Status: Impaired  Orientation Level: Oriented to situation;Oriented to person;Disoriented to place;Oriented to time(oriented to hospital but thought she was in [place])  Observation/Palpation  Posture: Good  Observation: sling and swathe in place  Balance  Sitting Balance: Stand by assistance  Standing Balance: Minimal assistance(/CGA)  Standing Balance  Time: up to 7-8 min  Activity: functional mobility to/from bathroom, grooming at sink  Comment: HHA for mobility, CGA for standing balance at sink, no LOB  Functional Mobility  Functional - Mobility Device: Other(HHA L UE)  Activity: To/from bathroom  Assist Level: Minimal assistance  ADL  Grooming: Contact guard assistance;Setup(set-up to place toothpaste on toothbrush, stood at sink to comb hair)  UE Dressing: Setup(to doff/don sling)  LE Dressing: Maximum assistance(pt able to pull depends down/up around L hip, assist for threading and doffing around LEs)  Tone RUE  RUE Tone: Normotonic  Tone LUE  LUE Tone: Normotonic  Coordination  Movements Are Fluid And Coordinated: Yes     Bed mobility  Supine to Sit: Moderate assistance  Scooting: Stand by assistance  Transfers  Sit to stand: Minimal assistance(from EOB x1, from recliner ADL surfaces.   Long term goals  Time Frame for Long term goals : STG=LTG       Therapy Time   Individual Concurrent Group Co-treatment   Time In 1043         Time Out 1126         Minutes 43              Timed Code Treatment Minutes:   28    Total Treatment Minutes:  1 [facility], OT   [place], [place] [id number]

## 2019-05-31 NOTE — PROGRESS NOTES
Department of Internal Medicine  General Internal Medicine   Progress Note      SUBJECTIVE: appears comfortable     History obtained from chart review and the patient  General ROS: positive for  - fatigue and malaise  negative for - chills, fever or night sweats  Psychological ROS: negative  Ophthalmic ROS: negative  Respiratory ROS: no cough, shortness of breath, or wheezing  Cardiovascular ROS: no chest pain or dyspnea on exertion  Gastrointestinal ROS: no abdominal pain, change in bowel habits, or black or bloody stools  Genito-Urinary ROS: no dysuria, trouble voiding, or hematuria  Musculoskeletal ROS: negative  Neurological ROS: no TIA or stroke symptoms  Dermatological ROS: negative    OBJECTIVE      Medications      Current Facility-Administered Medications: [START ON 6/1/2019] pneumococcal polyvalent (PNEUMOVAX 23) inj 0.5 mL, 0.5 mL, Intramuscular, Once  rivaroxaban (XARELTO) tablet 20 mg, 20 mg, Oral, Daily  glucose (GLUTOSE) 40 % oral gel 15 g, 15 g, Oral, PRN  dextrose 50 % solution 12.5 g, 12.5 g, Intravenous, PRN  glucagon (rDNA) injection 1 mg, 1 mg, Intramuscular, PRN  dextrose 5 % solution, 100 mL/hr, Intravenous, PRN  morphine (PF) injection 2 mg, 2 mg, Intravenous, Q2H PRN  sodium chloride flush 0.9 % injection 10 mL, 10 mL, Intravenous, 2 times per day  sodium chloride flush 0.9 % injection 10 mL, 10 mL, Intravenous, PRN  magnesium hydroxide (MILK OF MAGNESIA) 400 MG/5ML suspension 30 mL, 30 mL, Oral, Daily PRN  ondansetron (ZOFRAN) injection 4 mg, 4 mg, Intravenous, Q6H PRN  insulin lispro (HUMALOG) injection pen 0-12 Units, 0-12 Units, Subcutaneous, TID WC  insulin lispro (HUMALOG) injection pen 0-6 Units, 0-6 Units, Subcutaneous, Nightly    Physical      Vitals: /68   Pulse 76   Temp 96 °F (35.6 °C)   Resp 17   Ht 5' 5\" (1.651 m)   Wt 184 lb 9.6 oz (83.7 kg)   LMP  (LMP Unknown) Comment: post-menopausal  SpO2 94%   BMI 30.72 kg/m²   Temp: Temp: 96 °F (35.6 °C)  Max: Temp  Avg: 97.8 °F (36.6 °C)  Min: 96 °F (35.6 °C)  Max: 98.5 °F (36.9 °C)  Respiration range:  Resp  Avg: 15.8  Min: 14  Max: 18  Pulse Range:  Pulse  Av  Min: 72  Max: 88  Blood pressure range:  Systolic (22XAZ), RUPERT:201 , Min:100 , ITU:700   , Diastolic (05PXN), RHN:17, Min:56, Max:72    SpO2  Av.3 %  Min: 94 %  Max: 100 %    Intake/Output Summary (Last 24 hours) at 2019 1826  Last data filed at 2019 1411  Gross per 24 hour   Intake 560 ml   Output 425 ml   Net 135 ml       Vent settings:  Pulse  Av.2  Min: 67  Max: 102  Resp  Av.8  Min: 10  Max: 24  SpO2  Av.3 %  Min: 71 %  Max: 100 %  End Tidal CO2  Av.5 (%)  Min: 11 (%)  Max: 22 (%)    CONSTITUTIONAL:  fatigued, alert, cooperative, mild distress, appears stated age and moderately obese  EYES:  Unremarkable   BACK:  symmetric and no curvature  LUNGS:  Clear , no crackles or wheezes   CARDIOVASCULAR:  normal apical pulses, regular rate and rhythm, normal S1 and S2 and no S3  ABDOMEN:  No scars, normal bowel sounds, soft, non-distended, non-tender, no masses palpated, no hepatosplenomegally  CHEST/BREASTS:  NEX   GENITAL/URINARY:  NEX   MUSCULOSKELETAL:  Trace edema   NEUROLOGIC:  No acute focal findings   SKIN:  Warm and dry  and no bruising or bleeding    Data      Lab Results   Component Value Date    WBC 16.4 (H) 2019    HGB 13.5 2019    HCT 41.6 2019    MCV 97.0 2019     2019     Lab Results   Component Value Date     2019    K 4.1 2019     2019    CO2 24 2019    BUN 23 2019    CREATININE 1.1 2019    GLUCOSE 197 2019    CALCIUM 9.4 2019            ASSESSMENT AND PLAN     Active Problems:    Pulmonary embolism, bilateral (HCC)    Controlled type 2 diabetes mellitus, without long-term current use of insulin (HCC)    Essential hypertension    Dislocation of right shoulder joint    History of colon cancer  Resolved Problems:    * No resolved hospital problems.  *    Ct Oral anticoagulation awaiting SNF Placement

## 2019-05-31 NOTE — PROGRESS NOTES
[facility] Orthopedic Surgery   Progress Note    CHIEF COMPLAINT/DIAGNOSIS: Right shoulder anterior dislocation    SUBJECTIVE: Sitting up in the chair this AM.  Remains in the sling and swath. Noted disorientation notes from last night. She acknowledges getting confused times. She denies shoulder pain today. Denies numbness or tingling. To work with therapy today. OBJECTIVE  Physical    VITALS:  /61   Pulse 76   Temp 98.5 °F (36.9 °C) (Oral)   Resp 16   Ht 5' 5\" (1.651 m)   Wt 184 lb 9.6 oz (83.7 kg)   LMP  (LMP Unknown) Comment: post-menopausal  SpO2 96%   BMI 30.72 kg/m²     GENERAL: Alert and oriented x3, in no acute distress. MUSCULOSKELETAL: Able to flex and extend the wrist without issue. ROM: right shoulder ROM deferred. Sensory:  Intact to light touch in axillary, radial, ulnar distributions. Vascular:   2+ radial pulses with brisk cap refill. Data    ALL MEDICATIONS HAVE BEEN REVIEWED    CBC:   Recent Labs     05/28/19 2015 05/29/19  0433 05/30/19  0452   WBC 10.9 16.4*  --    HGB 15.0 13.5  --    HCT 45.3 41.6  --     189 145     BMP:   Recent Labs     05/28/19 2017      K 4.1      CO2 24   BUN 23*   CREATININE 1.1     INR:   Recent Labs     05/28/19 2015   INR 1.10       ASSESSMENT:  Right shoulder dislocation  PE  Cognitive deficits    PLAN:   Maintain sling & swathe x 2 weeks; reinforced today. May remove 3x day for elbow/wrist ROM with NO external rotation of shoulder. No pushing, pulling, lifting with RIGHT UE. Pain control per primary team.  PT/OT; to determine needs upon discharge. Follow-up with Dr. Austin Milton in 2 weeks; call [phone] for appt. Patient can d/c from ortho perspective once medically ready.      Patient does not use tobacco products     KEREN Cary-CNP  5/31/2019  9:05 AM

## 2019-06-01 VITALS
HEART RATE: 84 BPM | RESPIRATION RATE: 16 BRPM | HEIGHT: 65 IN | SYSTOLIC BLOOD PRESSURE: 125 MMHG | BODY MASS INDEX: 30.75 KG/M2 | DIASTOLIC BLOOD PRESSURE: 61 MMHG | TEMPERATURE: 97.9 F | WEIGHT: 184.6 LBS | OXYGEN SATURATION: 94 %

## 2019-06-01 LAB
APTT: 38.7 SEC (ref 26–36)
GLUCOSE BLD-MCNC: 134 MG/DL (ref 70–99)
GLUCOSE BLD-MCNC: 141 MG/DL (ref 70–99)
PERFORMED ON: ABNORMAL
PERFORMED ON: ABNORMAL
PLATELET # BLD: 209 K/UL (ref 135–450)

## 2019-06-01 PROCEDURE — 97530 THERAPEUTIC ACTIVITIES: CPT

## 2019-06-01 PROCEDURE — 97116 GAIT TRAINING THERAPY: CPT

## 2019-06-01 PROCEDURE — 97110 THERAPEUTIC EXERCISES: CPT

## 2019-06-01 PROCEDURE — 94760 N-INVAS EAR/PLS OXIMETRY 1: CPT

## 2019-06-01 PROCEDURE — 85730 THROMBOPLASTIN TIME PARTIAL: CPT

## 2019-06-01 PROCEDURE — 36415 COLL VENOUS BLD VENIPUNCTURE: CPT

## 2019-06-01 PROCEDURE — 85049 AUTOMATED PLATELET COUNT: CPT

## 2019-06-01 ASSESSMENT — PAIN SCALES - GENERAL
PAINLEVEL_OUTOF10: 0

## 2019-06-01 NOTE — PROGRESS NOTES
Bed alarm sounding. Pt. Up out of bed without assist. Oriented to use call light for needs. Expressed understanding. Assisted to RR w/ cane. Voided 200 mL. Returned to bed. No other needs at this time. Bed in lowest position and locked. Bedside table within reach. Bed alarm on. Call light within reach.

## 2019-06-01 NOTE — PROGRESS NOTES
dislocation, reduced in ED. Pt was also found to be hypoxic and has B PEs. Heparin started 5/29. Subjective   General  Family / Caregiver Present: Yes(neice in room half way through session)  Subjective  Subjective: Pt in bed upon arrival and denies pain, agreeable to therapy  Pain Screening  Patient Currently in Pain: Denies  Pain Assessment  Pain Assessment: 0-10  Pain Level: 0  Vital Signs  Patient Currently in Pain: Denies       Orientation  Orientation  Overall Orientation Status: Impaired  Orientation Level: Disoriented to place;Oriented to person;Oriented to situation;Disoriented to time  Cognition      Objective   Bed mobility  Rolling to Right: Minimal assistance  Supine to Sit: Moderate assistance  Scooting: Stand by assistance  Transfers  Sit to Stand: Minimal Assistance;Contact guard assistance  Stand to sit: Contact guard assistance  Ambulation  Ambulation?: Yes  More Ambulation?: Yes  Ambulation 1  Surface: level tile  Device: Hand-Held Assist  Assistance: Minimal assistance  Quality of Gait: decreased suzanna, decreased step length, decreased B stance time, narrow Ricco, decreased dorsiflexion   Ambulation 2  Surface - 2: level tile  Device 2: Single point cane  Assistance 2: Contact guard assistance  Quality of Gait 2: decreased suzanna, decreased step length, decreased B stance time, narrow Ricco, decreased dorsiflexion   Gait Deviations: Slow Suzanna;Decreased step length;Decreased step height  Distance: 50'  Comments: used cane in L UE due to NWB in R UE from dislocation. educated pt on proper use of cane.       Balance  Posture: Fair  Sitting - Static: Fair;+  Sitting - Dynamic: Fair;-  Standing - Static: Fair;+  Standing - Dynamic: Fair;-  Exercises  Hip Flexion: seated x20 bilaterally  Hip Abduction: seated with knee extension x20 bilaterally  Knee Long Arc Quad: seated x20 bilaterally  Ankle Pumps: seated x20 bilaterally                        Assessment   Body structures, Functions, Activity limitations: Decreased functional mobility ; Decreased balance;Decreased endurance;Decreased strength  Assessment: Pt demonstrates safety and strength deficits as well as cognitive deficits that make returning to independent living difficult at this time. Pt will continue to benefit from skilled therapy to work on strengthening and balance as well as overall functional mobility for safe return to home. Treatment Diagnosis: Impaired balance and endurance impairing functional mobility. Prognosis: Good  Patient Education: Pt POC, eval and d/c recommendations. REQUIRES PT FOLLOW UP: Yes  Activity Tolerance  Activity Tolerance: Patient Tolerated treatment well     G-Code     OutComes Score                                                  AM-PAC Score     AM-PAC Inpatient Mobility without Stair Climbing Raw Score : 14  AM-PAC Inpatient without Stair Climbing T-Scale Score : 40.85  Mobility Inpatient CMS 0-100% Score: 53.33  Mobility Inpatient without Stair CMS G-Code Modifier : CK       Goals  Short term goals  Time Frame for Short term goals: d/c  Short term goal 1: Pt will perform all bed mobility with supervision. Short term goal 2: Pt will amb 48' with CGA. Short term goal 3: Pt will negotiate one step with LRAD and CGA to ensure safe amb off curb outside home. Short term goal 4: Pt will perform sit to stand transfers with SBA. Patient Goals   Patient goals : to return home    Plan    Plan  Times per week: 5-7  Times per day: Daily  Current Treatment Recommendations: Strengthening, Endurance Training, Gait Training, Balance Training, Stair training, Positioning, Neuromuscular Re-education  Safety Devices  Type of devices:  All fall risk precautions in place, Call light within reach, Chair alarm in place, Gait belt, Patient at risk for falls, Left in chair, Nurse notified  Restraints  Initially in place: No     Therapy Time   Individual Concurrent Group Co-treatment   Time In 0853         Time Out 0940 Minutes 179 [facility] [address] [id number]

## 2019-06-01 NOTE — CARE COORDINATION
Discharge Planning. [facility] Awaiting return call. Social work will follow.      Electronically signed by HANNAH Chatman, SADE on 6/1/19 at 9:06 AM

## 2019-06-01 NOTE — PROGRESS NOTES
2030: Shift assessment completed, patient is alert but very confuse and combative. Patient begins to swing her cane, takes are sling off and refuses to but them back on. Patient refuses to lay in bed or sit in chair but eventually sat. Denies any pain. Patient states she won't take any medication hear at the Hospital, she will wait till she gets home. VSS, call light within reach, chair alarm on. Will monitor patient. The care plan and education has been reviewed and mutually agreed upon with the patient. 0300: Patient has got out off bed multiple, still acting confuse but less combative. Denies any pain, assisted to the bathroom multiple times. Will monitor patient.

## 2019-06-01 NOTE — PROGRESS NOTES
Department of Internal Medicine  General Internal Medicine   Progress Note      SUBJECTIVE: appears intermittently agitated  ,denies chest pain or SOB     History obtained from chart review and the patient  General ROS: positive for  - fatigue and malaise  negative for - chills, fever or night sweats  Psychological ROS: negative  Ophthalmic ROS: negative  Respiratory ROS: no cough, shortness of breath, or wheezing  Cardiovascular ROS: no chest pain or dyspnea on exertion  Gastrointestinal ROS: no abdominal pain, change in bowel habits, or black or bloody stools  Genito-Urinary ROS: no dysuria, trouble voiding, or hematuria  Musculoskeletal ROS: negative  Neurological ROS: no TIA or stroke symptoms  Dermatological ROS: negative    OBJECTIVE      Medications      No current facility-administered medications for this encounter.      Physical      Vitals: /61   Pulse 84   Temp 97.9 °F (36.6 °C) (Oral)   Resp 16   Ht 5' 5\" (1.651 m)   Wt 184 lb 9.6 oz (83.7 kg)   LMP  (LMP Unknown) Comment: post-menopausal  SpO2 94%   BMI 30.72 kg/m²   Temp: Temp: 97.9 °F (36.6 °C)  Max: Temp  Av °F (36.7 °C)  Min: 97.7 °F (36.5 °C)  Max: 98.3 °F (36.8 °C)  Respiration range:  Resp  Av.2  Min: 16  Max: 17  Pulse Range:  Pulse  Av  Min: 81  Max: 86  Blood pressure range:  Systolic (56KST), VZO:040 , Min:123 , NHZ:945   , Diastolic (29IKW), QBY:60, Min:61, Max:77    SpO2  Av.8 %  Min: 91 %  Max: 94 %    Intake/Output Summary (Last 24 hours) at 2019 1810  Last data filed at 2019 1030  Gross per 24 hour   Intake 300 ml   Output 200 ml   Net 100 ml       Vent settings:  Pulse  Av.9  Min: 67  Max: 102  Resp  Av.6  Min: 10  Max: 24  SpO2  Av.1 %  Min: 71 %  Max: 100 %  End Tidal CO2  Av.5 (%)  Min: 11 (%)  Max: 22 (%)    CONSTITUTIONAL:  fatigued, alert, cooperative, mild distress, appears stated age and moderately obese  EYES:  Unremarkable   BACK:  symmetric and no curvature  LUNGS:

## 2019-06-01 NOTE — CARE COORDINATION
Discharge Planning. Sandra Ferrer returned call, Precert was received. This worker set up transport for noon. This worker informed patients nurse. Pt's nurse will inform patient of discharge plan. MONIK and HENs were completed and faxed. Social work will follow through discharge. D/C Plan: To [facility]. [address] [facility] faxed. [facility] set for noon.  Report is [phone]    Electronically signed by HANNAH Greene, RAHULW on 6/1/19 at 10:03 AM

## 2019-06-01 NOTE — PROGRESS NOTES
Shift assessment complete, see doc flow sheet. VSS. Pt sitting up to chair with sling to RUE in place. Pt denies any pain at this time. Pt's niece at bedside and updated on POC, plan for discharge today to [facility] with  time of 1200. PIV removed with no complications. Chair alarm on, call light within reach.

## 2019-06-03 NOTE — PROGRESS NOTES
Physical Therapy Discharge Summary    Name: Christinia Councilman  : 1935    The pt was evaluated by PT on 19 and seen for 1 treatment sessions prior to DC to [facility]CF on 19 per MD order. The pt's acute therapy goals were:  Short term goals  Time Frame for Short term goals: d/c  Short term goal 1: Pt will perform all bed mobility with supervision. Short term goal 2: Pt will amb 48' with CGA. Short term goal 3: Pt will negotiate one step with LRAD and CGA to ensure safe amb off curb outside home. Short term goal 4: Pt will perform sit to stand transfers with SBA. Patient met 0 goals during stay. Number of Refusals:0  Number of Holds: 0  During this hospitalization, the patient was educated on:  Patient Education: Pt POC, eval and d/c recommendations. DC pt from PT caseload at this time. Thank you!     [address] [place], DPT [id number]

## 2019-06-14 ENCOUNTER — OFFICE VISIT (OUTPATIENT)
Dept: ORTHOPEDIC SURGERY | Age: 84
End: 2019-06-14
Payer: MEDICARE

## 2019-06-14 ENCOUNTER — TELEPHONE (OUTPATIENT)
Dept: ORTHOPEDIC SURGERY | Age: 84
End: 2019-06-14

## 2019-06-14 VITALS
HEIGHT: 65 IN | SYSTOLIC BLOOD PRESSURE: 138 MMHG | DIASTOLIC BLOOD PRESSURE: 67 MMHG | WEIGHT: 185 LBS | BODY MASS INDEX: 30.82 KG/M2 | HEART RATE: 72 BPM

## 2019-06-14 DIAGNOSIS — S43.004A DISLOCATION OF RIGHT SHOULDER JOINT, INITIAL ENCOUNTER: Primary | ICD-10-CM

## 2019-06-14 PROCEDURE — 4040F PNEUMOC VAC/ADMIN/RCVD: CPT | Performed by: ORTHOPAEDIC SURGERY

## 2019-06-14 PROCEDURE — 1036F TOBACCO NON-USER: CPT | Performed by: ORTHOPAEDIC SURGERY

## 2019-06-14 PROCEDURE — G8427 DOCREV CUR MEDS BY ELIG CLIN: HCPCS | Performed by: ORTHOPAEDIC SURGERY

## 2019-06-14 PROCEDURE — 99213 OFFICE O/P EST LOW 20 MIN: CPT | Performed by: ORTHOPAEDIC SURGERY

## 2019-06-14 PROCEDURE — 1123F ACP DISCUSS/DSCN MKR DOCD: CPT | Performed by: ORTHOPAEDIC SURGERY

## 2019-06-14 PROCEDURE — G8400 PT W/DXA NO RESULTS DOC: HCPCS | Performed by: ORTHOPAEDIC SURGERY

## 2019-06-14 PROCEDURE — G8419 CALC BMI OUT NRM PARAM NOF/U: HCPCS | Performed by: ORTHOPAEDIC SURGERY

## 2019-06-14 PROCEDURE — 1090F PRES/ABSN URINE INCON ASSESS: CPT | Performed by: ORTHOPAEDIC SURGERY

## 2019-06-14 PROCEDURE — 1111F DSCHRG MED/CURRENT MED MERGE: CPT | Performed by: ORTHOPAEDIC SURGERY

## 2019-06-22 NOTE — PROGRESS NOTES
Nir Osorio  [id number]  Encounter Date: 6/14/2019  YOB: 1935    Chief Complaint   Patient presents with    Injury     Right shoulder dislocation       History:Ms. Nir Osorio is here in follow up regarding her right shoulder. She was seen in the ER May 28 after a fall from standing height resulting in the right shoulder dislocation. Shoulder was reduced in the ER and she is placed in a sling. X-rays at that time revealed no evidence for fracture. She's been slowly increasing her activities but continues to use her sling as prescribed. She is here for outpatient follow-up after discharge. Current pain level is as described below and I reviewed these findings with her today. Pain Assessment  Location of Pain: Shoulder  Location Modifiers: Right  Severity of Pain: 6  Quality of Pain: Aching  Duration of Pain: Persistent  Frequency of Pain: Constant  Date Pain First Started: 05/28/19  Limiting Behavior: Yes  Result of Injury: Yes  Work-Related Injury: No  Are there other pain locations you wish to document?: No    Exam:  /67   Pulse 72   Ht 5' 5\" (1.651 m)   Wt 185 lb (83.9 kg)   LMP  (LMP Unknown) Comment: post-menopausal  BMI 30.79 kg/m²   General: Alert and oriented x3, No acute distress, Cooperative and conversant. Obesity Present  Mood and affect are appropriate. Right shoulder:  Swelling and bruising are resolving. She tolerates assisted range of motion with forward flexion to about 60° and abduction of 50°. Good elbow and wrist range of motion. Right hand  strength 4+/5. Sensation to light touch grossly present throughout the right upper extremity  Capillary refill < 2 seconds and palpable distal pulses  Skin: no erythema, lesions or rashes  No signs / symptoms of DVT / PE or infection    X-rays:  3 views right shoulder were repeated today showing no evidence for dislocation.   She does have moderate arthritic changes at the acromioclavicular joint which based on her age have likely been present for several years. Assessment:   Diagnosis Orders   1. Dislocation of right shoulder joint, initial encounter  XR SHOULDER RIGHT (MIN 2 VIEWS)       Orders  Orders Placed This Encounter   Procedures    XR SHOULDER RIGHT (MIN 2 VIEWS)       Plan:  She'll begin gentle increase in activities and weaning out of her sling. I did encourage her to still use her sling when she leaves the house. She will avoid active external rotation beyond 30° and active abduction beyond 90° in the scapular plane. She'll continue with range of motion exercises for her elbow and wrist.  I will see her back in 6 weeks' time to check her progress. I expect that at that point she will have completely weaned out of the sling and should be almost recovered from this injury. She understands and accepts this course of care.

## 2019-07-03 NOTE — PROGRESS NOTES
Patient seen , discharge dictated scripts given , arrangements made , MONIK completed .  Discussed with nursing staff  And   If applicable ,  Discussed with  Patient's family , all questions answered and concerns addressed  When applicable

## 2019-07-04 NOTE — DISCHARGE SUMMARY
[facility] [place] [facility]                     [facility], [address]                               DISCHARGE SUMMARY    PATIENT NAME: Zach Villeda                       :        1935  MED REC NO:   [id number]                          ROOM:       4606  ACCOUNT NO:   [de-identified]                           ADMIT DATE: 2019  PROVIDER:     Jess Finney MD                  DISCHARGE DATE:  2019    The patient is an 20-year-old white lady, came to the emergency room. FINAL DIAGNOSES:  1. Acute pulmonary embolism. 2.  Acute anterior shoulder dislocation. 3.  Hypertension. 4.  Status post fall. 5.  Cognitive deficits. 6.  Hyperlipidemia. 7.  Type 2 diabetes mellitus, controlled with diet. 8.  History of colon cancer  9. Gout. DISCHARGE MEDICATIONS:  1. Lasix 20 mg once a day. 2.  Zetia 10 mg once a day. 3.  Xarelto 20 mg once a day. 4.  Cholecalciferol 2000 units once a day. 5.  Advil 200 mg b.i.d. p.r.n.  6.  Coumadin 2 mg daily was discontinued  7. Multivitamin once a day. 8.  Prinivil 10 mg was discontinued. HOSPITAL COURSE:  This elderly woman came to the emergency room with  history of increasing shortness of breath and a fall, had an anterior  dislocation of the shoulder. She underwent closed reduction and  immobilization  in the emergency room. Her vital signs are stable. Temperature 98.5, blood pressure 112/52, respirations 18, heart rate 88. X-ray was consistent with anterior dislocation of the shoulder. Sodium  142, potassium 4.1, chloride 103, CO2 of 24, BUN 23, creatinine 1.1. Anion gap was 15. GFR was 47. Blood sugar was 215. Calcium 9.4. ProBNP level 7136. Troponin less than 0.01. Liver panel essentially  within normal limits. Hemoglobin and hematocrit was 13.5 and 41.6. Urinalysis negative for acute UTI.   CT scan of the chest with contrast  showed bilateral emboli with underlying emphysema and atelectasis of the  lung. The patient was put on Xarelto. The patient was given PT/OT  evaluation, put her  on initially a heparin drip, parenteral pain  relief, supplemental oxygen. After clinical stabilization, PT/OT  evaluation and orthopedic surgery consultation, it was decided to refer  the case to  who made necessary arrangements for the patient  to go to skilled nursing facility at [facility]. Necessary  pre-certification was obtained, and the patient was discharged in stable  condition. Ongoing care provider will be the doctor at [facility]. It is always a pleasure to take care of your patients at [address] [place] [address].         Larry Mobley MD    D: 07/03/2019 19:31:50       T: 07/04/2019 8:19:44     [email or web address]  Job#: [id number]     Doc#: [id number]    CC:  Carlyn Moseley MD

## 2021-12-03 ENCOUNTER — ANESTHESIA EVENT (OUTPATIENT)
Dept: OPERATING ROOM | Age: 86
DRG: 480 | End: 2021-12-03
Payer: MEDICARE

## 2021-12-03 ENCOUNTER — APPOINTMENT (OUTPATIENT)
Dept: GENERAL RADIOLOGY | Age: 86
DRG: 480 | End: 2021-12-03
Payer: MEDICARE

## 2021-12-03 ENCOUNTER — APPOINTMENT (OUTPATIENT)
Dept: CT IMAGING | Age: 86
DRG: 480 | End: 2021-12-03
Payer: MEDICARE

## 2021-12-03 ENCOUNTER — HOSPITAL ENCOUNTER (INPATIENT)
Age: 86
LOS: 6 days | Discharge: NURSING, REHAB OR LONG-TERM CARE | DRG: 480 | End: 2021-12-09
Attending: INTERNAL MEDICINE | Admitting: INTERNAL MEDICINE
Payer: MEDICARE
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
## 2021-12-03 DIAGNOSIS — S72.121A CLOSED DISPLACED FRACTURE OF LESSER TROCHANTER OF RIGHT FEMUR, INITIAL ENCOUNTER (HCC): Primary | ICD-10-CM

## 2021-12-03 DIAGNOSIS — F41.9 ANXIETY: ICD-10-CM

## 2021-12-03 DIAGNOSIS — R29.6 FREQUENT FALLS: ICD-10-CM

## 2021-12-03 PROBLEM — S72.012G: Status: ACTIVE | Noted: 2021-12-03

## 2021-12-03 PROBLEM — N17.9 AKI (ACUTE KIDNEY INJURY) (HCC): Status: ACTIVE | Noted: 2021-12-03

## 2021-12-03 PROBLEM — S72.22XA CLOSED LEFT SUBTROCHANTERIC FEMUR FRACTURE (HCC): Status: ACTIVE | Noted: 2021-12-03

## 2021-12-03 LAB
A/G RATIO: 1.2 (ref 1.1–2.2)
ALBUMIN SERPL-MCNC: 3.7 G/DL (ref 3.4–5)
ALP BLD-CCNC: 98 U/L (ref 40–129)
ALT SERPL-CCNC: 13 U/L (ref 10–40)
ANION GAP SERPL CALCULATED.3IONS-SCNC: 14 MMOL/L (ref 3–16)
APTT: 33.9 SEC (ref 26.2–38.6)
AST SERPL-CCNC: 21 U/L (ref 15–37)
BASOPHILS ABSOLUTE: 0 K/UL (ref 0–0.2)
BASOPHILS RELATIVE PERCENT: 0.3 %
BILIRUB SERPL-MCNC: 0.6 MG/DL (ref 0–1)
BILIRUBIN URINE: NEGATIVE
BLOOD, URINE: ABNORMAL
BUN BLDV-MCNC: 39 MG/DL (ref 7–20)
CALCIUM SERPL-MCNC: 9.6 MG/DL (ref 8.3–10.6)
CHLORIDE BLD-SCNC: 99 MMOL/L (ref 99–110)
CLARITY: ABNORMAL
CO2: 27 MMOL/L (ref 21–32)
COLOR: YELLOW
CREAT SERPL-MCNC: 1.5 MG/DL (ref 0.6–1.2)
EOSINOPHILS ABSOLUTE: 0.1 K/UL (ref 0–0.6)
EOSINOPHILS RELATIVE PERCENT: 0.4 %
EPITHELIAL CELLS, UA: 1 /HPF (ref 0–5)
GFR AFRICAN AMERICAN: 40
GFR NON-AFRICAN AMERICAN: 33
GLUCOSE BLD-MCNC: 212 MG/DL (ref 70–99)
GLUCOSE URINE: NEGATIVE MG/DL
HCT VFR BLD CALC: 39.6 % (ref 36–48)
HEMOGLOBIN: 12.8 G/DL (ref 12–16)
HYALINE CASTS: 2 /LPF (ref 0–8)
INR BLD: 1.15 (ref 0.88–1.12)
KETONES, URINE: NEGATIVE MG/DL
LEUKOCYTE ESTERASE, URINE: ABNORMAL
LYMPHOCYTES ABSOLUTE: 0.9 K/UL (ref 1–5.1)
LYMPHOCYTES RELATIVE PERCENT: 6.4 %
MCH RBC QN AUTO: 31.5 PG (ref 26–34)
MCHC RBC AUTO-ENTMCNC: 32.3 G/DL (ref 31–36)
MCV RBC AUTO: 97.5 FL (ref 80–100)
MICROSCOPIC EXAMINATION: YES
MONOCYTES ABSOLUTE: 0.8 K/UL (ref 0–1.3)
MONOCYTES RELATIVE PERCENT: 5.5 %
NEUTROPHILS ABSOLUTE: 12.8 K/UL (ref 1.7–7.7)
NEUTROPHILS RELATIVE PERCENT: 87.4 %
NITRITE, URINE: NEGATIVE
PDW BLD-RTO: 15.1 % (ref 12.4–15.4)
PH UA: 5.5 (ref 5–8)
PLATELET # BLD: 260 K/UL (ref 135–450)
PMV BLD AUTO: 8.4 FL (ref 5–10.5)
POTASSIUM REFLEX MAGNESIUM: 4.6 MMOL/L (ref 3.5–5.1)
PRO-BNP: 214 PG/ML (ref 0–449)
PROTEIN UA: ABNORMAL MG/DL
PROTHROMBIN TIME: 13.1 SEC (ref 9.9–12.7)
RBC # BLD: 4.06 M/UL (ref 4–5.2)
RBC UA: 13 /HPF (ref 0–4)
SARS-COV-2, NAAT: NOT DETECTED
SODIUM BLD-SCNC: 140 MMOL/L (ref 136–145)
SPECIFIC GRAVITY UA: 1.01 (ref 1–1.03)
TOTAL PROTEIN: 6.8 G/DL (ref 6.4–8.2)
TROPONIN: <0.01 NG/ML
URINE REFLEX TO CULTURE: YES
URINE TYPE: ABNORMAL
UROBILINOGEN, URINE: 0.2 E.U./DL
WBC # BLD: 14.7 K/UL (ref 4–11)
WBC UA: 269 /HPF (ref 0–5)

## 2021-12-03 PROCEDURE — 80053 COMPREHEN METABOLIC PANEL: CPT

## 2021-12-03 PROCEDURE — 2700000000 HC OXYGEN THERAPY PER DAY

## 2021-12-03 PROCEDURE — 36415 COLL VENOUS BLD VENIPUNCTURE: CPT

## 2021-12-03 PROCEDURE — 87186 SC STD MICRODIL/AGAR DIL: CPT

## 2021-12-03 PROCEDURE — 85610 PROTHROMBIN TIME: CPT

## 2021-12-03 PROCEDURE — 2580000003 HC RX 258: Performed by: PHYSICIAN ASSISTANT

## 2021-12-03 PROCEDURE — 87086 URINE CULTURE/COLONY COUNT: CPT

## 2021-12-03 PROCEDURE — 85730 THROMBOPLASTIN TIME PARTIAL: CPT

## 2021-12-03 PROCEDURE — 71045 X-RAY EXAM CHEST 1 VIEW: CPT

## 2021-12-03 PROCEDURE — 70450 CT HEAD/BRAIN W/O DYE: CPT

## 2021-12-03 PROCEDURE — 93005 ELECTROCARDIOGRAM TRACING: CPT | Performed by: PHYSICIAN ASSISTANT

## 2021-12-03 PROCEDURE — 96375 TX/PRO/DX INJ NEW DRUG ADDON: CPT

## 2021-12-03 PROCEDURE — 87077 CULTURE AEROBIC IDENTIFY: CPT

## 2021-12-03 PROCEDURE — 73502 X-RAY EXAM HIP UNI 2-3 VIEWS: CPT

## 2021-12-03 PROCEDURE — 81001 URINALYSIS AUTO W/SCOPE: CPT

## 2021-12-03 PROCEDURE — 6360000002 HC RX W HCPCS: Performed by: PHYSICIAN ASSISTANT

## 2021-12-03 PROCEDURE — 83880 ASSAY OF NATRIURETIC PEPTIDE: CPT

## 2021-12-03 PROCEDURE — 72125 CT NECK SPINE W/O DYE: CPT

## 2021-12-03 PROCEDURE — 84484 ASSAY OF TROPONIN QUANT: CPT

## 2021-12-03 PROCEDURE — 87635 SARS-COV-2 COVID-19 AMP PRB: CPT

## 2021-12-03 PROCEDURE — 83036 HEMOGLOBIN GLYCOSYLATED A1C: CPT

## 2021-12-03 PROCEDURE — 73552 X-RAY EXAM OF FEMUR 2/>: CPT

## 2021-12-03 PROCEDURE — 94760 N-INVAS EAR/PLS OXIMETRY 1: CPT

## 2021-12-03 PROCEDURE — 96374 THER/PROPH/DIAG INJ IV PUSH: CPT

## 2021-12-03 PROCEDURE — 99284 EMERGENCY DEPT VISIT MOD MDM: CPT

## 2021-12-03 PROCEDURE — 1200000000 HC SEMI PRIVATE

## 2021-12-03 PROCEDURE — 85025 COMPLETE CBC W/AUTO DIFF WBC: CPT

## 2021-12-03 RX ORDER — HYDRALAZINE HYDROCHLORIDE 20 MG/ML
10 INJECTION INTRAMUSCULAR; INTRAVENOUS EVERY 6 HOURS PRN
Status: DISCONTINUED | OUTPATIENT
Start: 2021-12-03 | End: 2021-12-09 | Stop reason: HOSPADM

## 2021-12-03 RX ORDER — ONDANSETRON 2 MG/ML
4 INJECTION INTRAMUSCULAR; INTRAVENOUS ONCE
Status: COMPLETED | OUTPATIENT
Start: 2021-12-03 | End: 2021-12-03

## 2021-12-03 RX ORDER — EZETIMIBE 10 MG/1
10 TABLET ORAL NIGHTLY
Status: DISCONTINUED | OUTPATIENT
Start: 2021-12-03 | End: 2021-12-03 | Stop reason: RX

## 2021-12-03 RX ORDER — LACTULOSE 10 G/15ML
20 SOLUTION ORAL 2 TIMES DAILY
COMMUNITY

## 2021-12-03 RX ORDER — POTASSIUM CHLORIDE 20 MEQ/1
20 TABLET, EXTENDED RELEASE ORAL
COMMUNITY

## 2021-12-03 RX ORDER — VITAMIN B COMPLEX
2000 TABLET ORAL DAILY
Status: DISCONTINUED | OUTPATIENT
Start: 2021-12-04 | End: 2021-12-09 | Stop reason: HOSPADM

## 2021-12-03 RX ORDER — INSULIN LISPRO 100 [IU]/ML
0-6 INJECTION, SOLUTION INTRAVENOUS; SUBCUTANEOUS NIGHTLY
Status: DISCONTINUED | OUTPATIENT
Start: 2021-12-03 | End: 2021-12-05 | Stop reason: DRUGHIGH

## 2021-12-03 RX ORDER — 0.9 % SODIUM CHLORIDE 0.9 %
500 INTRAVENOUS SOLUTION INTRAVENOUS PRN
Status: DISCONTINUED | OUTPATIENT
Start: 2021-12-03 | End: 2021-12-09 | Stop reason: HOSPADM

## 2021-12-03 RX ORDER — FUROSEMIDE 20 MG/1
20 TABLET ORAL DAILY
Status: DISCONTINUED | OUTPATIENT
Start: 2021-12-04 | End: 2021-12-04

## 2021-12-03 RX ORDER — DEXTROSE MONOHYDRATE 25 G/50ML
12.5 INJECTION, SOLUTION INTRAVENOUS PRN
Status: DISCONTINUED | OUTPATIENT
Start: 2021-12-03 | End: 2021-12-09 | Stop reason: HOSPADM

## 2021-12-03 RX ORDER — AMINO ACIDS/PROTEIN HYDROLYS 11-80/30ML
30 LIQUID (ML) ORAL 2 TIMES DAILY
COMMUNITY

## 2021-12-03 RX ORDER — MORPHINE SULFATE 2 MG/ML
1 INJECTION, SOLUTION INTRAMUSCULAR; INTRAVENOUS EVERY 4 HOURS PRN
Status: DISCONTINUED | OUTPATIENT
Start: 2021-12-03 | End: 2021-12-09 | Stop reason: HOSPADM

## 2021-12-03 RX ORDER — INSULIN LISPRO 100 [IU]/ML
0-12 INJECTION, SOLUTION INTRAVENOUS; SUBCUTANEOUS
Status: DISCONTINUED | OUTPATIENT
Start: 2021-12-04 | End: 2021-12-05 | Stop reason: DRUGHIGH

## 2021-12-03 RX ORDER — ONDANSETRON 4 MG/1
4 TABLET, ORALLY DISINTEGRATING ORAL EVERY 8 HOURS PRN
Status: DISCONTINUED | OUTPATIENT
Start: 2021-12-03 | End: 2021-12-09 | Stop reason: HOSPADM

## 2021-12-03 RX ORDER — QUETIAPINE FUMARATE 100 MG/1
100 TABLET, FILM COATED ORAL 2 TIMES DAILY
COMMUNITY

## 2021-12-03 RX ORDER — SODIUM CHLORIDE 9 MG/ML
INJECTION, SOLUTION INTRAVENOUS CONTINUOUS
Status: DISCONTINUED | OUTPATIENT
Start: 2021-12-03 | End: 2021-12-09 | Stop reason: HOSPADM

## 2021-12-03 RX ORDER — OXYCODONE HYDROCHLORIDE 5 MG/1
5 TABLET ORAL EVERY 4 HOURS PRN
Status: DISCONTINUED | OUTPATIENT
Start: 2021-12-03 | End: 2021-12-04 | Stop reason: ALTCHOICE

## 2021-12-03 RX ORDER — DEXTROSE MONOHYDRATE 50 MG/ML
100 INJECTION, SOLUTION INTRAVENOUS PRN
Status: DISCONTINUED | OUTPATIENT
Start: 2021-12-03 | End: 2021-12-09 | Stop reason: HOSPADM

## 2021-12-03 RX ORDER — POTASSIUM CHLORIDE 20 MEQ/1
40 TABLET, EXTENDED RELEASE ORAL PRN
Status: DISCONTINUED | OUTPATIENT
Start: 2021-12-03 | End: 2021-12-03

## 2021-12-03 RX ORDER — SODIUM CHLORIDE 9 MG/ML
1000 INJECTION, SOLUTION INTRAVENOUS CONTINUOUS
Status: DISCONTINUED | OUTPATIENT
Start: 2021-12-03 | End: 2021-12-04 | Stop reason: ALTCHOICE

## 2021-12-03 RX ORDER — MORPHINE SULFATE 4 MG/ML
4 INJECTION, SOLUTION INTRAMUSCULAR; INTRAVENOUS
Status: DISCONTINUED | OUTPATIENT
Start: 2021-12-03 | End: 2021-12-09 | Stop reason: HOSPADM

## 2021-12-03 RX ORDER — OXYCODONE HYDROCHLORIDE 5 MG/1
10 TABLET ORAL EVERY 4 HOURS PRN
Status: DISCONTINUED | OUTPATIENT
Start: 2021-12-03 | End: 2021-12-04 | Stop reason: ALTCHOICE

## 2021-12-03 RX ORDER — ACETAMINOPHEN 325 MG/1
650 TABLET ORAL 3 TIMES DAILY PRN
COMMUNITY

## 2021-12-03 RX ORDER — POTASSIUM CHLORIDE 7.45 MG/ML
10 INJECTION INTRAVENOUS PRN
Status: DISCONTINUED | OUTPATIENT
Start: 2021-12-03 | End: 2021-12-03

## 2021-12-03 RX ORDER — NICOTINE POLACRILEX 4 MG
15 LOZENGE BUCCAL PRN
Status: DISCONTINUED | OUTPATIENT
Start: 2021-12-03 | End: 2021-12-09 | Stop reason: HOSPADM

## 2021-12-03 RX ORDER — TORSEMIDE 20 MG/1
60 TABLET ORAL DAILY
COMMUNITY

## 2021-12-03 RX ORDER — MORPHINE SULFATE 2 MG/ML
2 INJECTION, SOLUTION INTRAMUSCULAR; INTRAVENOUS
Status: DISCONTINUED | OUTPATIENT
Start: 2021-12-03 | End: 2021-12-09 | Stop reason: HOSPADM

## 2021-12-03 RX ORDER — SODIUM CHLORIDE 0.9 % (FLUSH) 0.9 %
5-40 SYRINGE (ML) INJECTION EVERY 12 HOURS SCHEDULED
Status: DISCONTINUED | OUTPATIENT
Start: 2021-12-03 | End: 2021-12-09 | Stop reason: HOSPADM

## 2021-12-03 RX ORDER — ONDANSETRON 2 MG/ML
4 INJECTION INTRAMUSCULAR; INTRAVENOUS EVERY 6 HOURS PRN
Status: DISCONTINUED | OUTPATIENT
Start: 2021-12-03 | End: 2021-12-04 | Stop reason: SDUPTHER

## 2021-12-03 RX ORDER — LORAZEPAM 1 MG/1
1 TABLET ORAL 2 TIMES DAILY PRN
Status: ON HOLD | COMMUNITY
End: 2021-12-08 | Stop reason: SDUPTHER

## 2021-12-03 RX ORDER — SODIUM CHLORIDE 0.9 % (FLUSH) 0.9 %
10 SYRINGE (ML) INJECTION PRN
Status: DISCONTINUED | OUTPATIENT
Start: 2021-12-03 | End: 2021-12-09 | Stop reason: HOSPADM

## 2021-12-03 RX ORDER — SODIUM CHLORIDE 9 MG/ML
25 INJECTION, SOLUTION INTRAVENOUS PRN
Status: DISCONTINUED | OUTPATIENT
Start: 2021-12-03 | End: 2021-12-09 | Stop reason: HOSPADM

## 2021-12-03 RX ADMIN — MORPHINE SULFATE 2 MG: 2 INJECTION, SOLUTION INTRAMUSCULAR; INTRAVENOUS at 17:48

## 2021-12-03 RX ADMIN — SODIUM CHLORIDE 1000 ML: 9 INJECTION, SOLUTION INTRAVENOUS at 17:50

## 2021-12-03 RX ADMIN — ONDANSETRON 4 MG: 2 INJECTION INTRAMUSCULAR; INTRAVENOUS at 17:47

## 2021-12-03 ASSESSMENT — PAIN SCALES - GENERAL
PAINLEVEL_OUTOF10: 3
PAINLEVEL_OUTOF10: 5

## 2021-12-03 ASSESSMENT — PAIN DESCRIPTION - DESCRIPTORS
DESCRIPTORS: ACHING
DESCRIPTORS: ACHING

## 2021-12-03 ASSESSMENT — PAIN DESCRIPTION - PAIN TYPE
TYPE: ACUTE PAIN
TYPE: ACUTE PAIN

## 2021-12-03 ASSESSMENT — PAIN DESCRIPTION - LOCATION
LOCATION: LEG

## 2021-12-03 ASSESSMENT — PAIN DESCRIPTION - ORIENTATION
ORIENTATION: RIGHT

## 2021-12-03 ASSESSMENT — PAIN DESCRIPTION - FREQUENCY: FREQUENCY: CONTINUOUS

## 2021-12-03 NOTE — ED NOTES
Bed: 20  Expected date:   Expected time:   Means of arrival: [facility]  Comments:  Mariana Dunlap RN  12/03/21 0199

## 2021-12-03 NOTE — ED PROVIDER NOTES
[facility]        Pt Name: Shakira Schofield  MRN: [id number]  [place] 1935  Date of evaluation: 12/3/2021  Provider: Tino Mcdonald PA-C  PCP: Baltazar Gerard  Note Started: 4:57 PM EST       YOHANA. I have evaluated this patient. My supervising physician was available for consultation. Lilo Hughes MD      CHIEF COMPLAINT       Chief Complaint   Patient presents with    Fall     Pt brought in by ems from [facility] for fall. Pt is c/o right leg pain. Pt has bruising to right forehead from a previous fall. HISTORY OF PRESENT ILLNESS   (Location, Timing/Onset, Context/Setting, Quality, Duration, Modifying Factors, Severity, Associated Signs and Symptoms)  Note limiting factors. Chief Complaint: Fall with injury    Shakira Schofield is a 81 y.o. female who presents by EMS from local [facility]. Patient states walking and fell causing injury to the right hip with external rotation and shortening. She has some ecchymotic change on the forehead from a previous fall. Patient states was walking on the sidewalk when she apparently fell as she at times does. However she is a nursing home patient. She is here [facility]. I am not so sure she was outside walking on a sidewalk. She has no other related complaints regarding the fall. Only complaint is pain right hip area. No prior surgery on hip or knees bilateral.  I do not find evidence of ED visit regarding the head injury that occurred days ago. The ecchymotic changes appear at least 1to 11days old. Nursing Notes were all reviewed and agreed with or any disagreements were addressed in the HPI. REVIEW OF SYSTEMS    (2-9 systems for level 4, 10 or more for level 5)     Review of Systems    Positives and Pertinent negatives as per HPI. Except as noted above in the ROS, all other systems were reviewed and negative.        PAST MEDICAL HISTORY     Past Medical History: Diagnosis Date    Anemia     [facility] ([facility]     Diabetes mellitus ([facility]     not on meds    Fall     Gout     Hypercholesterolemia     Hypertension     Liver disease     Memory loss     Pulmonary embolism (HCC)     Rosacea     Vitamin D deficiency          SURGICAL HISTORY     Past Surgical History:   Procedure Laterality Date    CATARACT REMOVAL Bilateral 2010    CHOLECYSTECTOMY      COLECTOMY      DILATION AND CURETTAGE OF UTERUS      HIP ARTHROPLASTY Left 2012    LIVER RESECTION Right     TOTAL NEPHRECTOMY Left     d/t hydronephrosis         CURRENTMEDICATIONS       Previous Medications    CHOLECALCIFEROL 2000 UNITS CAPS    Take 1 capsule by mouth    EZETIMIBE (ZETIA) 10 MG TABLET    Take 10 mg by mouth    FUROSEMIDE (LASIX) 20 MG TABLET    Take 1 tablet by mouth daily    IBUPROFEN (ADVIL) 200 MG TABLET    Take 200 mg by mouth    MULTIPLE VITAMINS-MINERALS (MULTIVITAMIN PO)    Take 1 tablet by mouth daily    RIVAROXABAN (XARELTO) 20 MG TABS TABLET    Take 20 mg by mouth         ALLERGIES     Hydrochlorothiazide w-triamterene, Iodinated diagnostic agents, Spironolactone, and Tobramycin sulfate    FAMILYHISTORY     History reviewed. No pertinent family history. SOCIAL HISTORY       Social History     Tobacco Use    Smoking status: Former Smoker     Years: 15.00     Types: Cigarettes     Quit date:      Years since quittin.9    Smokeless tobacco: Never Used   Vaping Use    Vaping Use: Never used   Substance Use Topics    Alcohol use: Yes    Drug use: Never       SCREENINGS             PHYSICAL EXAM    (up to 7 for level 4, 8 or more for level 5)     ED Triage Vitals [21 1607]   BP Temp Temp Source Pulse Resp SpO2 Height Weight   -- 98.7 °F (37.1 °C) Oral 81 18 96 % 5' 11\" (1.803 m) 135 lb (61.2 kg)       Physical Exam  Vitals and nursing note reviewed. Constitutional:       Appearance: Normal appearance. She is well-developed and normal weight.    HENT:      Head: Normocephalic and atraumatic. Right Ear: External ear normal.      Left Ear: External ear normal.   Eyes:      General: No scleral icterus. Right eye: No discharge. Left eye: No discharge. Conjunctiva/sclera: Conjunctivae normal.   Cardiovascular:      Rate and Rhythm: Normal rate and regular rhythm. Heart sounds: Normal heart sounds. Pulmonary:      Effort: Pulmonary effort is normal.      Breath sounds: Normal breath sounds. Abdominal:      General: Abdomen is flat. Bowel sounds are normal.      Palpations: Abdomen is soft. Musculoskeletal:         General: Tenderness present. Normal range of motion. Cervical back: Normal range of motion and neck supple. Comments: Patient has tenderness about the right hip. She does exhibit external rotation and limb shortening. DP pulses noted. Sensory intact to the toes. Skin:     General: Skin is warm and dry. Neurological:      General: No focal deficit present. Mental Status: She is alert. Mental status is at baseline. Psychiatric:         Mood and Affect: Mood normal.         Behavior: Behavior normal.         Thought Content:  Thought content normal.         DIAGNOSTIC RESULTS   LABS:    Labs Reviewed   CBC WITH AUTO DIFFERENTIAL - Abnormal; Notable for the following components:       Result Value    WBC 14.7 (*)     Neutrophils Absolute 12.8 (*)     Lymphocytes Absolute 0.9 (*)     All other components within normal limits    Narrative:     Performed at:  [facility] [address], [place], [facility] [address]   Phone [phone]   COMPREHENSIVE METABOLIC PANEL W/ REFLEX TO MG FOR LOW K - Abnormal; Notable for the following components:    Glucose 212 (*)     BUN 39 (*)     CREATININE 1.5 (*)     GFR Non- 33 (*)     GFR  40 (*)     All other components within normal limits    Narrative:     Performed at:  [facility]  [address] [facility],  [place] [address] [address]   Phone [phone]   PROTIME-INR - Abnormal; Notable for the following components:    Protime 13.1 (*)     INR 1.15 (*)     All other components within normal limits    Narrative:     Performed at:  [facility] [address] [address]   Phone [phone], RAPID    Narrative:     Performed at:  [facility] [address], [place], [facility] [place]   Phone [phone]   TROPONIN    Narrative:     Performed at:  [facility] [address],  [place], [facility] [place]   Phone [phone]   BRAIN NATRIURETIC PEPTIDE    Narrative:     Performed at:  [facility] [address],  [place], [facility] [place]   Phone [phone]   APTT    Narrative:     Performed at:  [facility] [address],  [place], [facility] [place]   Phone [phone]   URINE RT REFLEX TO CULTURE       When ordered only abnormal lab results are displayed. All other labs were within normal range or not returned as of this dictation. EKG: When ordered, EKG's are interpreted by the Emergency Department Physician in the absence of a cardiologist.  Please see their note for interpretation of EKG. RADIOLOGY:   Non-plain film images such as CT, Ultrasound and MRI are read by the radiologist. Plain radiographic images are visualized and preliminarily interpreted by the ED Provider with the below findings:        Interpretation per the Radiologist below, if available at the time of this note:    CT Cervical Spine WO Contrast   Final Result   No acute abnormality of the cervical spine. CT Head WO Contrast   Final Result   No acute intracranial abnormality. Diffuse atrophic changes with findings suggesting chronic microvascular   ischemia         XR CHEST PORTABLE   Final Result   No acute process.          XR HIP 2-3 VW W PELVIS normal hepatic function. However, BUN 39, creatinine 1.5 and GFR 33. Troponin less than 0.01. INR 1.15. Imaging; right femur/right hip and pelvis showing a spiral fracture of the proximal right femur which is just below the lesser trochanter. Displacement and oblique character noted. Head CT scan showed no acute intracranial process. CT scan cervical spine showed no acute osseous abnormality. Chest x-ray showing no acute cardiopulmonary abnormality    Patient will require admission for surgical intervention by orthopedics tomorrow. Admission to the hospitalist.    Covid study pending at this time. I placed order for NaCl at 125/h. Zofran 4 L IV given. Morphine sulfate IV 10 mg given. Did place a repeat morphine sulfate 2 mg every 3 hours as needed for pain. This patient is a resident at [facility]. I will contact Dr. Alison Caal for admission. Request submitted at 6:45 PM.    At 7 PM I spoke with Dr. Dusty Faith on-call for Dr. Alison Caal. The patient will be admitted. FINAL IMPRESSION      1. Closed displaced fracture of lesser trochanter of right femur, initial encounter ([facility]    2. Fall on same level from slipping, tripping or stumbling, initial encounter    3. Frequent falls          DISPOSITION/PLAN   DISPOSITION Decision To Admit 12/03/2021 05:26:48 PM      PATIENT REFERRED TO:  No follow-up provider specified. DISCHARGE MEDICATIONS:  New Prescriptions    No medications on file       DISCONTINUED MEDICATIONS:  Discontinued Medications    No medications on file              (Please note that portions of this note were completed with a voice recognition program.  Efforts were made to edit the dictations but occasionally words are mis-transcribed. )    Paige Crisostomo PA-C (electronically signed)           Paige Crisostomo PA-C  12/03/21 1900

## 2021-12-03 NOTE — ED PROVIDER NOTES
EKG reviewed by myself  Dated todaty, 1841  Rate 93, NSR, Normal EKg.      Kaylyn Fonseca MD  12/03/21 9794

## 2021-12-04 ENCOUNTER — APPOINTMENT (OUTPATIENT)
Dept: GENERAL RADIOLOGY | Age: 86
DRG: 480 | End: 2021-12-04
Payer: MEDICARE

## 2021-12-04 ENCOUNTER — ANESTHESIA (OUTPATIENT)
Dept: OPERATING ROOM | Age: 86
DRG: 480 | End: 2021-12-04
Payer: MEDICARE

## 2021-12-04 VITALS
DIASTOLIC BLOOD PRESSURE: 55 MMHG | OXYGEN SATURATION: 100 % | RESPIRATION RATE: 7 BRPM | SYSTOLIC BLOOD PRESSURE: 97 MMHG

## 2021-12-04 LAB
A/G RATIO: 1.1 (ref 1.1–2.2)
ABO/RH: NORMAL
ALBUMIN SERPL-MCNC: 3.2 G/DL (ref 3.4–5)
ALP BLD-CCNC: 83 U/L (ref 40–129)
ALT SERPL-CCNC: 11 U/L (ref 10–40)
ANION GAP SERPL CALCULATED.3IONS-SCNC: 14 MMOL/L (ref 3–16)
ANTIBODY SCREEN: NORMAL
AST SERPL-CCNC: 19 U/L (ref 15–37)
BASOPHILS ABSOLUTE: 0 K/UL (ref 0–0.2)
BASOPHILS RELATIVE PERCENT: 0.2 %
BILIRUB SERPL-MCNC: 0.6 MG/DL (ref 0–1)
BUN BLDV-MCNC: 42 MG/DL (ref 7–20)
CALCIUM SERPL-MCNC: 8.6 MG/DL (ref 8.3–10.6)
CHLORIDE BLD-SCNC: 102 MMOL/L (ref 99–110)
CO2: 23 MMOL/L (ref 21–32)
CREAT SERPL-MCNC: 1.5 MG/DL (ref 0.6–1.2)
EKG ATRIAL RATE: 93 BPM
EKG DIAGNOSIS: NORMAL
EKG P AXIS: 84 DEGREES
EKG P-R INTERVAL: 176 MS
EKG Q-T INTERVAL: 368 MS
EKG QRS DURATION: 78 MS
EKG QTC CALCULATION (BAZETT): 457 MS
EKG R AXIS: 8 DEGREES
EKG T AXIS: 67 DEGREES
EKG VENTRICULAR RATE: 93 BPM
EOSINOPHILS ABSOLUTE: 0 K/UL (ref 0–0.6)
EOSINOPHILS RELATIVE PERCENT: 0 %
ESTIMATED AVERAGE GLUCOSE: 154.2 MG/DL
GFR AFRICAN AMERICAN: 40
GFR NON-AFRICAN AMERICAN: 33
GLUCOSE BLD-MCNC: 195 MG/DL (ref 70–99)
GLUCOSE BLD-MCNC: 220 MG/DL (ref 70–99)
GLUCOSE BLD-MCNC: 233 MG/DL (ref 70–99)
GLUCOSE BLD-MCNC: 259 MG/DL (ref 70–99)
GLUCOSE BLD-MCNC: 260 MG/DL (ref 70–99)
GLUCOSE BLD-MCNC: 300 MG/DL (ref 70–99)
HBA1C MFR BLD: 7 %
HCT VFR BLD CALC: 21.6 % (ref 36–48)
HCT VFR BLD CALC: 32 % (ref 36–48)
HEMOGLOBIN: 10.2 G/DL (ref 12–16)
HEMOGLOBIN: 6.9 G/DL (ref 12–16)
LYMPHOCYTES ABSOLUTE: 1.1 K/UL (ref 1–5.1)
LYMPHOCYTES RELATIVE PERCENT: 6.5 %
MCH RBC QN AUTO: 31.4 PG (ref 26–34)
MCHC RBC AUTO-ENTMCNC: 31.9 G/DL (ref 31–36)
MCV RBC AUTO: 98.4 FL (ref 80–100)
MONOCYTES ABSOLUTE: 1 K/UL (ref 0–1.3)
MONOCYTES RELATIVE PERCENT: 6.3 %
NEUTROPHILS ABSOLUTE: 14.4 K/UL (ref 1.7–7.7)
NEUTROPHILS RELATIVE PERCENT: 87 %
PDW BLD-RTO: 15.1 % (ref 12.4–15.4)
PERFORMED ON: ABNORMAL
PLATELET # BLD: 270 K/UL (ref 135–450)
PMV BLD AUTO: 8.3 FL (ref 5–10.5)
POTASSIUM REFLEX MAGNESIUM: 5.2 MMOL/L (ref 3.5–5.1)
RBC # BLD: 3.25 M/UL (ref 4–5.2)
SODIUM BLD-SCNC: 139 MMOL/L (ref 136–145)
TOTAL PROTEIN: 6 G/DL (ref 6.4–8.2)
WBC # BLD: 16.6 K/UL (ref 4–11)

## 2021-12-04 PROCEDURE — 3700000000 HC ANESTHESIA ATTENDED CARE: Performed by: ORTHOPAEDIC SURGERY

## 2021-12-04 PROCEDURE — 36415 COLL VENOUS BLD VENIPUNCTURE: CPT

## 2021-12-04 PROCEDURE — 2500000003 HC RX 250 WO HCPCS: Performed by: ORTHOPAEDIC SURGERY

## 2021-12-04 PROCEDURE — 80053 COMPREHEN METABOLIC PANEL: CPT

## 2021-12-04 PROCEDURE — 86850 RBC ANTIBODY SCREEN: CPT

## 2021-12-04 PROCEDURE — 2709999900 HC NON-CHARGEABLE SUPPLY: Performed by: ORTHOPAEDIC SURGERY

## 2021-12-04 PROCEDURE — 6360000002 HC RX W HCPCS

## 2021-12-04 PROCEDURE — 85018 HEMOGLOBIN: CPT

## 2021-12-04 PROCEDURE — 93010 ELECTROCARDIOGRAM REPORT: CPT | Performed by: INTERNAL MEDICINE

## 2021-12-04 PROCEDURE — P9045 ALBUMIN (HUMAN), 5%, 250 ML: HCPCS | Performed by: ANESTHESIOLOGY

## 2021-12-04 PROCEDURE — C1769 GUIDE WIRE: HCPCS | Performed by: ORTHOPAEDIC SURGERY

## 2021-12-04 PROCEDURE — 6360000002 HC RX W HCPCS: Performed by: ORTHOPAEDIC SURGERY

## 2021-12-04 PROCEDURE — 85014 HEMATOCRIT: CPT

## 2021-12-04 PROCEDURE — C1713 ANCHOR/SCREW BN/BN,TIS/BN: HCPCS | Performed by: ORTHOPAEDIC SURGERY

## 2021-12-04 PROCEDURE — 3700000001 HC ADD 15 MINUTES (ANESTHESIA): Performed by: ORTHOPAEDIC SURGERY

## 2021-12-04 PROCEDURE — 6370000000 HC RX 637 (ALT 250 FOR IP): Performed by: INTERNAL MEDICINE

## 2021-12-04 PROCEDURE — 2580000003 HC RX 258: Performed by: INTERNAL MEDICINE

## 2021-12-04 PROCEDURE — 36430 TRANSFUSION BLD/BLD COMPNT: CPT

## 2021-12-04 PROCEDURE — 2500000003 HC RX 250 WO HCPCS: Performed by: ANESTHESIOLOGY

## 2021-12-04 PROCEDURE — 7100000001 HC PACU RECOVERY - ADDTL 15 MIN: Performed by: ORTHOPAEDIC SURGERY

## 2021-12-04 PROCEDURE — 6360000002 HC RX W HCPCS: Performed by: ANESTHESIOLOGY

## 2021-12-04 PROCEDURE — P9045 ALBUMIN (HUMAN), 5%, 250 ML: HCPCS

## 2021-12-04 PROCEDURE — 0QH606Z INSERTION OF INTRAMEDULLARY INTERNAL FIXATION DEVICE INTO RIGHT UPPER FEMUR, OPEN APPROACH: ICD-10-PCS | Performed by: ORTHOPAEDIC SURGERY

## 2021-12-04 PROCEDURE — 2580000003 HC RX 258: Performed by: ANESTHESIOLOGY

## 2021-12-04 PROCEDURE — 85025 COMPLETE CBC W/AUTO DIFF WBC: CPT

## 2021-12-04 PROCEDURE — 1200000000 HC SEMI PRIVATE

## 2021-12-04 PROCEDURE — 7100000000 HC PACU RECOVERY - FIRST 15 MIN: Performed by: ORTHOPAEDIC SURGERY

## 2021-12-04 PROCEDURE — 3600000004 HC SURGERY LEVEL 4 BASE: Performed by: ORTHOPAEDIC SURGERY

## 2021-12-04 PROCEDURE — 94150 VITAL CAPACITY TEST: CPT

## 2021-12-04 PROCEDURE — 86901 BLOOD TYPING SEROLOGIC RH(D): CPT

## 2021-12-04 PROCEDURE — 73502 X-RAY EXAM HIP UNI 2-3 VIEWS: CPT

## 2021-12-04 PROCEDURE — 6370000000 HC RX 637 (ALT 250 FOR IP): Performed by: ANESTHESIOLOGY

## 2021-12-04 PROCEDURE — 6360000002 HC RX W HCPCS: Performed by: INTERNAL MEDICINE

## 2021-12-04 PROCEDURE — 2580000003 HC RX 258: Performed by: ORTHOPAEDIC SURGERY

## 2021-12-04 PROCEDURE — 86900 BLOOD TYPING SEROLOGIC ABO: CPT

## 2021-12-04 PROCEDURE — 3600000014 HC SURGERY LEVEL 4 ADDTL 15MIN: Performed by: ORTHOPAEDIC SURGERY

## 2021-12-04 PROCEDURE — 6370000000 HC RX 637 (ALT 250 FOR IP): Performed by: ORTHOPAEDIC SURGERY

## 2021-12-04 PROCEDURE — 86923 COMPATIBILITY TEST ELECTRIC: CPT

## 2021-12-04 PROCEDURE — P9016 RBC LEUKOCYTES REDUCED: HCPCS

## 2021-12-04 PROCEDURE — 2720000010 HC SURG SUPPLY STERILE: Performed by: ORTHOPAEDIC SURGERY

## 2021-12-04 PROCEDURE — 3209999900 FLUORO FOR SURGICAL PROCEDURES

## 2021-12-04 PROCEDURE — 99223 1ST HOSP IP/OBS HIGH 75: CPT | Performed by: ORTHOPAEDIC SURGERY

## 2021-12-04 DEVICE — SCREW BNE L52MM DIA5MM ST TIB LT GRN TI ST CANN LOK FULL: Type: IMPLANTABLE DEVICE | Site: HIP | Status: FUNCTIONAL

## 2021-12-04 DEVICE — SCREW BNE L48MM DIA5MM LAT FEM LT GRN TI ST LOK FULL THRD: Type: IMPLANTABLE DEVICE | Site: HIP | Status: FUNCTIONAL

## 2021-12-04 DEVICE — SCREW BNE L90MM DIA10.35MM PROX FEM G TI CANN FOR TFN ADV: Type: IMPLANTABLE DEVICE | Site: HIP | Status: FUNCTIONAL

## 2021-12-04 DEVICE — NAIL IM L400MM DIA11MM 130DEG LNG R PROX FEM GRN TI CANN: Type: IMPLANTABLE DEVICE | Site: HIP | Status: FUNCTIONAL

## 2021-12-04 RX ORDER — ALBUMIN, HUMAN INJ 5% 5 %
12.5 SOLUTION INTRAVENOUS ONCE
Status: COMPLETED | OUTPATIENT
Start: 2021-12-04 | End: 2021-12-04

## 2021-12-04 RX ORDER — SODIUM CHLORIDE 0.9 % (FLUSH) 0.9 %
5-40 SYRINGE (ML) INJECTION PRN
Status: DISCONTINUED | OUTPATIENT
Start: 2021-12-04 | End: 2021-12-04 | Stop reason: SDUPTHER

## 2021-12-04 RX ORDER — LIDOCAINE HYDROCHLORIDE 20 MG/ML
INJECTION, SOLUTION EPIDURAL; INFILTRATION; INTRACAUDAL; PERINEURAL PRN
Status: DISCONTINUED | OUTPATIENT
Start: 2021-12-04 | End: 2021-12-04 | Stop reason: SDUPTHER

## 2021-12-04 RX ORDER — DEXAMETHASONE SODIUM PHOSPHATE 10 MG/ML
INJECTION, SOLUTION INTRAMUSCULAR; INTRAVENOUS PRN
Status: DISCONTINUED | OUTPATIENT
Start: 2021-12-04 | End: 2021-12-04 | Stop reason: SDUPTHER

## 2021-12-04 RX ORDER — POTASSIUM CHLORIDE 20 MEQ/1
20 TABLET, EXTENDED RELEASE ORAL
Status: DISCONTINUED | OUTPATIENT
Start: 2021-12-05 | End: 2021-12-09 | Stop reason: HOSPADM

## 2021-12-04 RX ORDER — SODIUM CHLORIDE 9 MG/ML
25 INJECTION, SOLUTION INTRAVENOUS PRN
Status: DISCONTINUED | OUTPATIENT
Start: 2021-12-04 | End: 2021-12-04 | Stop reason: SDUPTHER

## 2021-12-04 RX ORDER — SODIUM CHLORIDE 0.9 % (FLUSH) 0.9 %
5-40 SYRINGE (ML) INJECTION PRN
Status: DISCONTINUED | OUTPATIENT
Start: 2021-12-04 | End: 2021-12-04 | Stop reason: HOSPADM

## 2021-12-04 RX ORDER — ACETAMINOPHEN 325 MG/1
650 TABLET ORAL EVERY 4 HOURS PRN
Status: DISCONTINUED | OUTPATIENT
Start: 2021-12-04 | End: 2021-12-09 | Stop reason: HOSPADM

## 2021-12-04 RX ORDER — ROCURONIUM BROMIDE 10 MG/ML
INJECTION, SOLUTION INTRAVENOUS PRN
Status: DISCONTINUED | OUTPATIENT
Start: 2021-12-04 | End: 2021-12-04 | Stop reason: SDUPTHER

## 2021-12-04 RX ORDER — PROPOFOL 10 MG/ML
INJECTION, EMULSION INTRAVENOUS PRN
Status: DISCONTINUED | OUTPATIENT
Start: 2021-12-04 | End: 2021-12-04 | Stop reason: SDUPTHER

## 2021-12-04 RX ORDER — AMINO ACIDS/PROTEIN HYDROLYS 11-80/30ML
30 LIQUID (ML) ORAL 2 TIMES DAILY
Status: DISCONTINUED | OUTPATIENT
Start: 2021-12-04 | End: 2021-12-04 | Stop reason: RX

## 2021-12-04 RX ORDER — SENNA AND DOCUSATE SODIUM 50; 8.6 MG/1; MG/1
1 TABLET, FILM COATED ORAL 2 TIMES DAILY
Status: DISCONTINUED | OUTPATIENT
Start: 2021-12-04 | End: 2021-12-09 | Stop reason: HOSPADM

## 2021-12-04 RX ORDER — SODIUM CHLORIDE 0.9 % (FLUSH) 0.9 %
5-40 SYRINGE (ML) INJECTION EVERY 12 HOURS SCHEDULED
Status: DISCONTINUED | OUTPATIENT
Start: 2021-12-04 | End: 2021-12-04 | Stop reason: HOSPADM

## 2021-12-04 RX ORDER — MAGNESIUM HYDROXIDE 1200 MG/15ML
LIQUID ORAL CONTINUOUS PRN
Status: COMPLETED | OUTPATIENT
Start: 2021-12-04 | End: 2021-12-04

## 2021-12-04 RX ORDER — SODIUM CHLORIDE, SODIUM LACTATE, POTASSIUM CHLORIDE, CALCIUM CHLORIDE 600; 310; 30; 20 MG/100ML; MG/100ML; MG/100ML; MG/100ML
INJECTION, SOLUTION INTRAVENOUS CONTINUOUS
Status: DISCONTINUED | OUTPATIENT
Start: 2021-12-04 | End: 2021-12-04

## 2021-12-04 RX ORDER — QUETIAPINE FUMARATE 100 MG/1
100 TABLET, FILM COATED ORAL 2 TIMES DAILY
Status: DISCONTINUED | OUTPATIENT
Start: 2021-12-04 | End: 2021-12-09 | Stop reason: HOSPADM

## 2021-12-04 RX ORDER — HYDROMORPHONE HYDROCHLORIDE 1 MG/ML
0.5 INJECTION, SOLUTION INTRAMUSCULAR; INTRAVENOUS; SUBCUTANEOUS
Status: DISCONTINUED | OUTPATIENT
Start: 2021-12-04 | End: 2021-12-09 | Stop reason: HOSPADM

## 2021-12-04 RX ORDER — FENTANYL CITRATE 50 UG/ML
50 INJECTION, SOLUTION INTRAMUSCULAR; INTRAVENOUS
Status: DISCONTINUED | OUTPATIENT
Start: 2021-12-04 | End: 2021-12-04 | Stop reason: HOSPADM

## 2021-12-04 RX ORDER — DEXMEDETOMIDINE HYDROCHLORIDE 100 UG/ML
INJECTION, SOLUTION INTRAVENOUS PRN
Status: DISCONTINUED | OUTPATIENT
Start: 2021-12-04 | End: 2021-12-04 | Stop reason: SDUPTHER

## 2021-12-04 RX ORDER — OXYCODONE HYDROCHLORIDE 5 MG/1
5 TABLET ORAL EVERY 4 HOURS PRN
Status: DISCONTINUED | OUTPATIENT
Start: 2021-12-04 | End: 2021-12-09 | Stop reason: HOSPADM

## 2021-12-04 RX ORDER — POLYETHYLENE GLYCOL 3350 17 G/17G
17 POWDER, FOR SOLUTION ORAL DAILY
Status: DISCONTINUED | OUTPATIENT
Start: 2021-12-04 | End: 2021-12-09 | Stop reason: HOSPADM

## 2021-12-04 RX ORDER — KETAMINE HCL IN NACL, ISO-OSM 100MG/10ML
SYRINGE (ML) INJECTION PRN
Status: DISCONTINUED | OUTPATIENT
Start: 2021-12-04 | End: 2021-12-04 | Stop reason: SDUPTHER

## 2021-12-04 RX ORDER — ALBUMIN, HUMAN INJ 5% 5 %
SOLUTION INTRAVENOUS
Status: COMPLETED
Start: 2021-12-04 | End: 2021-12-04

## 2021-12-04 RX ORDER — BUPIVACAINE HYDROCHLORIDE 5 MG/ML
INJECTION, SOLUTION EPIDURAL; INTRACAUDAL
Status: COMPLETED | OUTPATIENT
Start: 2021-12-04 | End: 2021-12-04

## 2021-12-04 RX ORDER — SODIUM CHLORIDE 9 MG/ML
25 INJECTION, SOLUTION INTRAVENOUS PRN
Status: DISCONTINUED | OUTPATIENT
Start: 2021-12-04 | End: 2021-12-04 | Stop reason: HOSPADM

## 2021-12-04 RX ORDER — LABETALOL HYDROCHLORIDE 5 MG/ML
5 INJECTION, SOLUTION INTRAVENOUS EVERY 10 MIN PRN
Status: DISCONTINUED | OUTPATIENT
Start: 2021-12-04 | End: 2021-12-04 | Stop reason: HOSPADM

## 2021-12-04 RX ORDER — SODIUM CHLORIDE, SODIUM LACTATE, POTASSIUM CHLORIDE, CALCIUM CHLORIDE 600; 310; 30; 20 MG/100ML; MG/100ML; MG/100ML; MG/100ML
INJECTION, SOLUTION INTRAVENOUS CONTINUOUS PRN
Status: DISCONTINUED | OUTPATIENT
Start: 2021-12-04 | End: 2021-12-04 | Stop reason: SDUPTHER

## 2021-12-04 RX ORDER — LIDOCAINE HYDROCHLORIDE 10 MG/ML
1 INJECTION, SOLUTION EPIDURAL; INFILTRATION; INTRACAUDAL; PERINEURAL
Status: DISCONTINUED | OUTPATIENT
Start: 2021-12-04 | End: 2021-12-04 | Stop reason: HOSPADM

## 2021-12-04 RX ORDER — EPHEDRINE SULFATE/0.9% NACL/PF 50 MG/5 ML
SYRINGE (ML) INTRAVENOUS PRN
Status: DISCONTINUED | OUTPATIENT
Start: 2021-12-04 | End: 2021-12-04 | Stop reason: SDUPTHER

## 2021-12-04 RX ORDER — FENTANYL CITRATE 50 UG/ML
INJECTION, SOLUTION INTRAMUSCULAR; INTRAVENOUS PRN
Status: DISCONTINUED | OUTPATIENT
Start: 2021-12-04 | End: 2021-12-04 | Stop reason: SDUPTHER

## 2021-12-04 RX ORDER — LACTULOSE 10 G/15ML
20 SOLUTION ORAL 2 TIMES DAILY
Status: DISCONTINUED | OUTPATIENT
Start: 2021-12-04 | End: 2021-12-09 | Stop reason: HOSPADM

## 2021-12-04 RX ORDER — TORSEMIDE 20 MG/1
60 TABLET ORAL DAILY
Status: DISCONTINUED | OUTPATIENT
Start: 2021-12-04 | End: 2021-12-09 | Stop reason: HOSPADM

## 2021-12-04 RX ORDER — SODIUM CHLORIDE 0.9 % (FLUSH) 0.9 %
5-40 SYRINGE (ML) INJECTION EVERY 12 HOURS SCHEDULED
Status: DISCONTINUED | OUTPATIENT
Start: 2021-12-04 | End: 2021-12-04 | Stop reason: SDUPTHER

## 2021-12-04 RX ORDER — LORAZEPAM 1 MG/1
1 TABLET ORAL 2 TIMES DAILY PRN
Status: DISCONTINUED | OUTPATIENT
Start: 2021-12-04 | End: 2021-12-09 | Stop reason: HOSPADM

## 2021-12-04 RX ORDER — ONDANSETRON 2 MG/ML
4 INJECTION INTRAMUSCULAR; INTRAVENOUS EVERY 6 HOURS PRN
Status: DISCONTINUED | OUTPATIENT
Start: 2021-12-04 | End: 2021-12-09 | Stop reason: HOSPADM

## 2021-12-04 RX ORDER — ALBUMIN, HUMAN INJ 5% 5 %
12.5 SOLUTION INTRAVENOUS
Status: DISCONTINUED | OUTPATIENT
Start: 2021-12-04 | End: 2021-12-04

## 2021-12-04 RX ORDER — CEFAZOLIN SODIUM 1 G/3ML
INJECTION, POWDER, FOR SOLUTION INTRAMUSCULAR; INTRAVENOUS PRN
Status: DISCONTINUED | OUTPATIENT
Start: 2021-12-04 | End: 2021-12-04 | Stop reason: SDUPTHER

## 2021-12-04 RX ORDER — HYDRALAZINE HYDROCHLORIDE 20 MG/ML
5 INJECTION INTRAMUSCULAR; INTRAVENOUS EVERY 10 MIN PRN
Status: DISCONTINUED | OUTPATIENT
Start: 2021-12-04 | End: 2021-12-04 | Stop reason: HOSPADM

## 2021-12-04 RX ORDER — ACETAMINOPHEN 500 MG
1000 TABLET ORAL ONCE
Status: DISCONTINUED | OUTPATIENT
Start: 2021-12-04 | End: 2021-12-04 | Stop reason: HOSPADM

## 2021-12-04 RX ORDER — SODIUM CHLORIDE 9 MG/ML
INJECTION, SOLUTION INTRAVENOUS PRN
Status: DISCONTINUED | OUTPATIENT
Start: 2021-12-04 | End: 2021-12-09 | Stop reason: HOSPADM

## 2021-12-04 RX ORDER — ONDANSETRON 2 MG/ML
INJECTION INTRAMUSCULAR; INTRAVENOUS PRN
Status: DISCONTINUED | OUTPATIENT
Start: 2021-12-04 | End: 2021-12-04 | Stop reason: SDUPTHER

## 2021-12-04 RX ADMIN — OXYCODONE 5 MG: 5 TABLET ORAL at 20:11

## 2021-12-04 RX ADMIN — LACTULOSE 20 G: 20 SOLUTION ORAL at 20:11

## 2021-12-04 RX ADMIN — SENNOSIDES AND DOCUSATE SODIUM 1 TABLET: 50; 8.6 TABLET ORAL at 20:18

## 2021-12-04 RX ADMIN — CEFAZOLIN 2000 MG: 10 INJECTION, POWDER, FOR SOLUTION INTRAVENOUS at 16:35

## 2021-12-04 RX ADMIN — Medication 10 MG: at 10:08

## 2021-12-04 RX ADMIN — SODIUM CHLORIDE: 9 INJECTION, SOLUTION INTRAVENOUS at 09:08

## 2021-12-04 RX ADMIN — PHENYLEPHRINE HYDROCHLORIDE 200 MCG: 10 INJECTION INTRAVENOUS at 11:19

## 2021-12-04 RX ADMIN — PHENYLEPHRINE HYDROCHLORIDE 200 MCG: 10 INJECTION INTRAVENOUS at 09:40

## 2021-12-04 RX ADMIN — SODIUM CHLORIDE 500 ML: 9 INJECTION, SOLUTION INTRAVENOUS at 13:28

## 2021-12-04 RX ADMIN — PHENYLEPHRINE HYDROCHLORIDE 200 MCG: 10 INJECTION INTRAVENOUS at 09:53

## 2021-12-04 RX ADMIN — Medication 10 MG: at 11:08

## 2021-12-04 RX ADMIN — Medication 20 MG: at 10:21

## 2021-12-04 RX ADMIN — ALBUMIN (HUMAN) 12.5 G: 12.5 INJECTION, SOLUTION INTRAVENOUS at 12:32

## 2021-12-04 RX ADMIN — FENTANYL CITRATE 50 MCG: 50 INJECTION, SOLUTION INTRAMUSCULAR; INTRAVENOUS at 11:08

## 2021-12-04 RX ADMIN — DEXMEDETOMIDINE HYDROCHLORIDE 0.4 MCG: 100 INJECTION, SOLUTION INTRAVENOUS at 09:28

## 2021-12-04 RX ADMIN — SODIUM CHLORIDE: 9 INJECTION, SOLUTION INTRAVENOUS at 16:39

## 2021-12-04 RX ADMIN — FENTANYL CITRATE 50 MCG: 50 INJECTION, SOLUTION INTRAMUSCULAR; INTRAVENOUS at 10:43

## 2021-12-04 RX ADMIN — SODIUM CHLORIDE: 9 INJECTION, SOLUTION INTRAVENOUS at 01:35

## 2021-12-04 RX ADMIN — SUGAMMADEX 200 MG: 100 INJECTION, SOLUTION INTRAVENOUS at 11:02

## 2021-12-04 RX ADMIN — DEXMEDETOMIDINE HYDROCHLORIDE 200 MCG: 100 INJECTION, SOLUTION INTRAVENOUS at 11:39

## 2021-12-04 RX ADMIN — FUROSEMIDE 20 MG: 20 TABLET ORAL at 07:47

## 2021-12-04 RX ADMIN — ONDANSETRON 4 MG: 2 INJECTION INTRAMUSCULAR; INTRAVENOUS at 09:17

## 2021-12-04 RX ADMIN — Medication 10 MG: at 09:21

## 2021-12-04 RX ADMIN — DEXAMETHASONE SODIUM PHOSPHATE 8 MG: 10 INJECTION, SOLUTION INTRAMUSCULAR; INTRAVENOUS at 09:17

## 2021-12-04 RX ADMIN — INSULIN HUMAN 8 UNITS: 100 INJECTION, SOLUTION PARENTERAL at 12:53

## 2021-12-04 RX ADMIN — ROCURONIUM BROMIDE 10 MG: 10 INJECTION, SOLUTION INTRAVENOUS at 10:44

## 2021-12-04 RX ADMIN — INSULIN LISPRO 4 UNITS: 100 INJECTION, SOLUTION INTRAVENOUS; SUBCUTANEOUS at 08:05

## 2021-12-04 RX ADMIN — PHENYLEPHRINE HYDROCHLORIDE 200 MCG: 10 INJECTION INTRAVENOUS at 11:52

## 2021-12-04 RX ADMIN — INSULIN LISPRO 2 UNITS: 100 INJECTION, SOLUTION INTRAVENOUS; SUBCUTANEOUS at 16:40

## 2021-12-04 RX ADMIN — PROPOFOL 50 MG: 10 INJECTION, EMULSION INTRAVENOUS at 09:21

## 2021-12-04 RX ADMIN — SODIUM CHLORIDE, POTASSIUM CHLORIDE, SODIUM LACTATE AND CALCIUM CHLORIDE: 600; 310; 30; 20 INJECTION, SOLUTION INTRAVENOUS at 10:31

## 2021-12-04 RX ADMIN — INSULIN LISPRO 3 UNITS: 100 INJECTION, SOLUTION INTRAVENOUS; SUBCUTANEOUS at 01:27

## 2021-12-04 RX ADMIN — PHENYLEPHRINE HYDROCHLORIDE 200 MCG: 10 INJECTION INTRAVENOUS at 09:30

## 2021-12-04 RX ADMIN — Medication 20 MG: at 09:58

## 2021-12-04 RX ADMIN — PROPOFOL 150 MG: 10 INJECTION, EMULSION INTRAVENOUS at 09:17

## 2021-12-04 RX ADMIN — INSULIN LISPRO 3 UNITS: 100 INJECTION, SOLUTION INTRAVENOUS; SUBCUTANEOUS at 20:17

## 2021-12-04 RX ADMIN — Medication 10 MG: at 10:45

## 2021-12-04 RX ADMIN — ALBUMIN (HUMAN) 12.5 G: 12.5 INJECTION, SOLUTION INTRAVENOUS at 13:47

## 2021-12-04 RX ADMIN — PHENYLEPHRINE HYDROCHLORIDE 200 MCG: 10 INJECTION INTRAVENOUS at 09:50

## 2021-12-04 RX ADMIN — PHENYLEPHRINE HYDROCHLORIDE 200 MCG: 10 INJECTION INTRAVENOUS at 10:46

## 2021-12-04 RX ADMIN — Medication 10 ML: at 01:38

## 2021-12-04 RX ADMIN — QUETIAPINE FUMARATE 100 MG: 100 TABLET ORAL at 20:11

## 2021-12-04 RX ADMIN — CEFAZOLIN 2000 MG: 1 INJECTION, POWDER, FOR SOLUTION INTRAVENOUS at 09:26

## 2021-12-04 RX ADMIN — PHENYLEPHRINE HYDROCHLORIDE 200 MCG: 10 INJECTION INTRAVENOUS at 09:18

## 2021-12-04 RX ADMIN — FAMOTIDINE 20 MG: 10 INJECTION, SOLUTION INTRAVENOUS at 12:49

## 2021-12-04 RX ADMIN — MORPHINE SULFATE 1 MG: 2 INJECTION, SOLUTION INTRAMUSCULAR; INTRAVENOUS at 06:44

## 2021-12-04 RX ADMIN — LIDOCAINE HYDROCHLORIDE 100 MG: 20 INJECTION, SOLUTION EPIDURAL; INFILTRATION; INTRACAUDAL; PERINEURAL at 09:17

## 2021-12-04 RX ADMIN — ALBUMIN, HUMAN INJ 5% 12.5 G: 5 SOLUTION at 12:32

## 2021-12-04 ASSESSMENT — PULMONARY FUNCTION TESTS
PIF_VALUE: 5
PIF_VALUE: 6
PIF_VALUE: 16
PIF_VALUE: 16
PIF_VALUE: 5
PIF_VALUE: 4
PIF_VALUE: 16
PIF_VALUE: 5
PIF_VALUE: 16
PIF_VALUE: 20
PIF_VALUE: 3
PIF_VALUE: 16
PIF_VALUE: 16
PIF_VALUE: 0
PIF_VALUE: 16
PIF_VALUE: 16
PIF_VALUE: 1
PIF_VALUE: 6
PIF_VALUE: 16
PIF_VALUE: 6
PIF_VALUE: 16
PIF_VALUE: 21
PIF_VALUE: 18
PIF_VALUE: 16
PIF_VALUE: 19
PIF_VALUE: 4
PIF_VALUE: 3
PIF_VALUE: 6
PIF_VALUE: 20
PIF_VALUE: 16
PIF_VALUE: 16
PIF_VALUE: 3
PIF_VALUE: 17
PIF_VALUE: 3
PIF_VALUE: 5
PIF_VALUE: 3
PIF_VALUE: 3
PIF_VALUE: 21
PIF_VALUE: 12
PIF_VALUE: 4
PIF_VALUE: 16
PIF_VALUE: 19
PIF_VALUE: 21
PIF_VALUE: 6
PIF_VALUE: 16
PIF_VALUE: 16
PIF_VALUE: 4
PIF_VALUE: 1
PIF_VALUE: 4
PIF_VALUE: 1
PIF_VALUE: 16
PIF_VALUE: 10
PIF_VALUE: 3
PIF_VALUE: 5
PIF_VALUE: 21
PIF_VALUE: 16
PIF_VALUE: 4
PIF_VALUE: 16
PIF_VALUE: 4
PIF_VALUE: 11
PIF_VALUE: 5
PIF_VALUE: 4
PIF_VALUE: 16
PIF_VALUE: 11
PIF_VALUE: 16
PIF_VALUE: 16
PIF_VALUE: 17
PIF_VALUE: 21
PIF_VALUE: 3
PIF_VALUE: 4
PIF_VALUE: 16
PIF_VALUE: 16
PIF_VALUE: 6
PIF_VALUE: 21
PIF_VALUE: 3
PIF_VALUE: 4
PIF_VALUE: 16
PIF_VALUE: 16
PIF_VALUE: 4
PIF_VALUE: 16
PIF_VALUE: 4
PIF_VALUE: 4
PIF_VALUE: 10
PIF_VALUE: 5
PIF_VALUE: 4
PIF_VALUE: 16
PIF_VALUE: 20
PIF_VALUE: 4
PIF_VALUE: 10
PIF_VALUE: 16
PIF_VALUE: 5
PIF_VALUE: 16
PIF_VALUE: 3
PIF_VALUE: 20
PIF_VALUE: 10
PIF_VALUE: 16
PIF_VALUE: 5
PIF_VALUE: 4
PIF_VALUE: 11
PIF_VALUE: 19
PIF_VALUE: 3
PIF_VALUE: 4
PIF_VALUE: 5
PIF_VALUE: 5
PIF_VALUE: 4
PIF_VALUE: 4
PIF_VALUE: 7
PIF_VALUE: 16
PIF_VALUE: 20
PIF_VALUE: 21
PIF_VALUE: 6
PIF_VALUE: 16
PIF_VALUE: 1
PIF_VALUE: 3
PIF_VALUE: 16
PIF_VALUE: 16
PIF_VALUE: 5
PIF_VALUE: 19
PIF_VALUE: 4
PIF_VALUE: 16
PIF_VALUE: 21
PIF_VALUE: 16
PIF_VALUE: 1
PIF_VALUE: 16
PIF_VALUE: 4
PIF_VALUE: 16
PIF_VALUE: 20
PIF_VALUE: 3
PIF_VALUE: 16
PIF_VALUE: 5
PIF_VALUE: 1
PIF_VALUE: 20
PIF_VALUE: 12
PIF_VALUE: 4
PIF_VALUE: 20
PIF_VALUE: 16
PIF_VALUE: 3
PIF_VALUE: 10
PIF_VALUE: 16
PIF_VALUE: 16
PIF_VALUE: 3
PIF_VALUE: 16
PIF_VALUE: 5
PIF_VALUE: 16
PIF_VALUE: 15
PIF_VALUE: 1
PIF_VALUE: 5
PIF_VALUE: 3
PIF_VALUE: 4
PIF_VALUE: 4
PIF_VALUE: 1
PIF_VALUE: 10
PIF_VALUE: 4
PIF_VALUE: 4
PIF_VALUE: 12
PIF_VALUE: 16
PIF_VALUE: 4
PIF_VALUE: 3
PIF_VALUE: 4
PIF_VALUE: 16
PIF_VALUE: 4
PIF_VALUE: 16
PIF_VALUE: 4
PIF_VALUE: 4
PIF_VALUE: 16
PIF_VALUE: 3
PIF_VALUE: 10
PIF_VALUE: 16
PIF_VALUE: 6
PIF_VALUE: 20
PIF_VALUE: 5
PIF_VALUE: 16
PIF_VALUE: 16
PIF_VALUE: 5
PIF_VALUE: 16
PIF_VALUE: 3
PIF_VALUE: 20
PIF_VALUE: 16
PIF_VALUE: 3
PIF_VALUE: 16
PIF_VALUE: 4
PIF_VALUE: 16
PIF_VALUE: 20
PIF_VALUE: 11
PIF_VALUE: 12
PIF_VALUE: 3

## 2021-12-04 ASSESSMENT — PAIN DESCRIPTION - ORIENTATION: ORIENTATION: RIGHT

## 2021-12-04 ASSESSMENT — PAIN SCALES - PAIN ASSESSMENT IN ADVANCED DEMENTIA (PAINAD)
BODYLANGUAGE: 1
CONSOLABILITY: 1
BREATHING: 1
TOTALSCORE: 7
FACIALEXPRESSION: 2
NEGVOCALIZATION: 2

## 2021-12-04 ASSESSMENT — PAIN SCALES - GENERAL
PAINLEVEL_OUTOF10: 0
PAINLEVEL_OUTOF10: 7
PAINLEVEL_OUTOF10: 8
PAINLEVEL_OUTOF10: 0
PAINLEVEL_OUTOF10: 3

## 2021-12-04 ASSESSMENT — PAIN DESCRIPTION - LOCATION: LOCATION: LEG

## 2021-12-04 ASSESSMENT — PAIN DESCRIPTION - PAIN TYPE: TYPE: ACUTE PAIN

## 2021-12-04 ASSESSMENT — PAIN DESCRIPTION - DESCRIPTORS: DESCRIPTORS: ACHING

## 2021-12-04 NOTE — PROGRESS NOTES
7:58 AM  Morning assessment complete. Patient awake and alert. Lasix given with a sip of water. New IV placed because patient keeps bending arm. Bed alarm on and call light in reach. The care plan and education has been reviewed and mutually agreed upon with the patient. 8:20 AM  Pre-op checklist complete. Spoke with patients niece, she is aware of surgery schedule today. 1:32 PM  Patient up from PACU. BP low 75/46, bolus started. PACU nurse to speak with anesthesia. 2:30 PM  BP better after bolus and albumin. Will continue to monitor. H/H pending. 2:50 PM  Message sent to ortho about H/H. New orders placed.

## 2021-12-04 NOTE — ANESTHESIA PRE PROCEDURE
Department of Anesthesiology  Preprocedure Note       Name:  Trent Carter   Age:  81 y.o.  :  1935                                          MRN:  [id number]         Date:  2021      Surgeon: Bety Gillis):  Jeffery Tyson MD    Procedure: Procedure(s):  RIGHT HIP INTERMEDULLARY NAIL, SYNTHESE, HANA TABLE, C-ARM    Medications prior to admission:   Prior to Admission medications    Medication Sig Start Date End Date Taking? Authorizing Provider   acetaminophen (TYLENOL) 325 MG tablet Take 650 mg by mouth 3 times daily as needed for Pain   Yes Historical Provider, MD   apixaban (ELIQUIS) 5 MG TABS tablet Take 5 mg by mouth 2 times daily   Yes Historical Provider, MD   lactulose (CHRONULAC) 10 GM/15ML solution Take 20 g by mouth 2 times daily   Yes Historical Provider, MD   LORazepam (ATIVAN) 1 MG tablet Take 1 mg by mouth 2 times daily as needed for Anxiety.    Yes Historical Provider, MD   potassium chloride (KLOR-CON M) 20 MEQ extended release tablet Take 20 mEq by mouth daily (with breakfast)   Yes Historical Provider, MD   Amino Acids-Protein Hydrolys (PRO-STAT MAX) LIQD Take 30 mLs by mouth 2 times daily   Yes Historical Provider, MD   QUEtiapine (SEROQUEL) 100 MG tablet Take 100 mg by mouth 2 times daily   Yes Historical Provider, MD   torsemide (DEMADEX) 20 MG tablet Take 60 mg by mouth daily   Yes Historical Provider, MD   vitamin D (CHOLECALCIFEROL) 25 MCG (1000 UT) TABS tablet Take 1,000 Units by mouth daily   Yes Historical Provider, MD   Multiple Vitamins-Minerals (MULTIVITAMIN PO) Take 1 tablet by mouth daily   Yes Historical Provider, MD   furosemide (LASIX) 20 MG tablet Take 1 tablet by mouth daily 19   Sheryl Ware MD   Cholecalciferol 2000 units CAPS Take 1 capsule by mouth  Patient not taking: Reported on 12/3/2021    Historical Provider, MD   ezetimibe (ZETIA) 10 MG tablet Take 10 mg by mouth 10/1/18   Historical Provider, MD   ibuprofen (ADVIL) 200 MG tablet Take 200 mg by mouth dextrose 5 % 100 mL IVPB  1,000 mg IntraVENous On Call to MARJORIEπόλλωνος MD Daren        hydrALAZINE (APRESOLINE) injection 10 mg  10 mg IntraVENous Q6H PRN Bennie Lujan MD        0.9 % sodium chloride bolus  500 mL IntraVENous PRN Bennie Lujan MD        morphine (PF) injection 1 mg  1 mg IntraVENous Q4H PRN Bennie Lujan MD   1 mg at 12/04/21 0644    insulin lispro (1 Unit Dial) 0-12 Units  0-12 Units SubCUTAneous TID WC Bennie Lujan MD   4 Units at 12/04/21 0805    insulin lispro (1 Unit Dial) 0-6 Units  0-6 Units SubCUTAneous Nightly Bennie Lujan MD   3 Units at 12/04/21 0127    glucose (GLUTOSE) 40 % oral gel 15 g  15 g Oral PRN Bennie Lujan MD        dextrose 50 % IV solution  12.5 g IntraVENous PRN Bennie Lujan MD        glucagon (rDNA) injection 1 mg  1 mg IntraMUSCular PRN Bennie Lujan MD        dextrose 5 % solution  100 mL/hr IntraVENous PRN Bennie Lujan MD           Allergies:     Allergies   Allergen Reactions    Hydrochlorothiazide W-Triamterene Rash    Iodinated Diagnostic Agents Rash     IVP Dye caused a rash    Spironolactone Rash    Tobramycin Sulfate Swelling and Rash     Eye ointment caused redness & swelling       Problem List:    Patient Active Problem List   Diagnosis Code    Pulmonary embolism, bilateral (HCC) I26.99    Controlled type 2 diabetes mellitus, without long-term current use of insulin (HCC) E11.9    Essential hypertension I10    Dislocation of right shoulder joint S43.004A    History of colon cancer Z85.038    DEANNA (acute kidney injury) ([address] N17.9    Closed left subtrochanteric femur fracture ([facility] S72.22XA    Closed subcapital fracture of femur with delayed healing, left S72.012G       Past Medical History:        Diagnosis Date    Anemia     Colon cancer ([facility]     Diabetes mellitus ([facility] [facility]     not on meds    Fall     Gout     Hypercholesterolemia     Hypertension     Liver disease     Memory loss     [facility]     Rosacea     Vitamin D deficiency        Past Surgical History:        Procedure Laterality Date    CATARACT REMOVAL Bilateral 2010    CHOLECYSTECTOMY      COLECTOMY      DILATION AND CURETTAGE OF UTERUS      HIP ARTHROPLASTY Left 2012    LIVER RESECTION Right     TOTAL NEPHRECTOMY Left     d/t hydronephrosis       Social History:    Social History     Tobacco Use    Smoking status: Former Smoker     Years: 15.00     Types: Cigarettes     Quit date:      Years since quittin.9    Smokeless tobacco: Never Used   Substance Use Topics    Alcohol use: Yes                                Counseling given: Not Answered      Vital Signs (Current):   Vitals:    21 2051 21 2330 21 0530 21 0715   BP:  116/75 112/63 (!) 110/57   Pulse:  96 95 85   Resp:  16 16 16   Temp:  98.1 °F (36.7 °C) 98.3 °F (36.8 °C) 98 °F (36.7 °C)   TempSrc:  Oral Axillary Oral   SpO2:  96% 94% 91%   Weight: 206 lb 14.4 oz (93.8 kg)      Height:                                                  BP Readings from Last 3 Encounters:   21 (!) 110/57   19 138/67   19 125/61       NPO Status: Time of last liquid consumption: 0715 (sip of water with am med)                        Time of last solid consumption: 0000                        Date of last liquid consumption: 21                        Date of last solid food consumption: 21    BMI:   Wt Readings from Last 3 Encounters:   21 206 lb 14.4 oz (93.8 kg)   19 185 lb (83.9 kg)   19 184 lb 9.6 oz (83.7 kg)     Body mass index is 28.86 kg/m².     CBC:   Lab Results   Component Value Date    WBC 16.6 2021    RBC 3.25 2021    HGB 10.2 2021    HCT 32.0 2021    MCV 98.4 2021    RDW 15.1 2021     2021       CMP:   Lab Results   Component Value Date     2021    K 5.2 2021     2021    CO2 23 2021    BUN 42 2021    CREATININE

## 2021-12-04 NOTE — FLOWSHEET NOTE
12/04/21 1344 12/04/21 1400 12/04/21 1408   Vitals   Heart Rate Source  --  Monitor Monitor   Resp  --  16 16   BP (!) 81/48 (!) 76/50 (!) 93/49      12/04/21 1415   Vitals   Heart Rate Source  --    Resp  --    BP (!) 102/50     Hypotension upon arrival to med-surg from PACU. PACU consulted with anesthesia MD Janice Bhardwaj. Albumin and IVF bolus given. Pressure improving. H&H drawn and sent with a stat order. Pt is confused (at baseline) and alert. She is telling stories about gifting with her niece who is at bedside. Report given at bedside to [facility]..         Christine CUBAN, RN, [facility]  Pre-Op/Recovery   Same Day Surgery

## 2021-12-04 NOTE — H&P
History and Physical  Dr. Estefania Becerra  12/3/2021    PCP: Gerri Dawn    Cc:   Chief Complaint   Patient presents with   Ольга Blank Fall     Pt brought in by ems from [facility] for fall. Pt is c/o right leg pain. Pt has bruising to right forehead from a previous fall. HPI:  Ilya Franco is a 81 y.o. female who has a past medical history of Anemia, Colon cancer ([facility] Diabetes mellitus ([address] Fall, Gout, Hypercholesterolemia, Hypertension, Liver disease, Memory loss, Pulmonary embolism ([address] Rosacea, and Vitamin D deficiency. Patient presents with Closed left subtrochanteric femur fracture ([address] HPI  (1-3 for expanded problem focused, ?4 for detailed/comprehensive)     81 y.o. female who presents by EMS from local [facility]. Patient states walking and fell causing injury to the right hip with external rotation and shortening. She has some ecchymotic change on the forehead from a previous fall. Patient states was walking on the sidewalk when she apparently fell as she at times does. However she is a nursing home patient. She is here [facility]. I am not so sure she was outside walking on a sidewalk. She has no other related complaints regarding the fall. Only complaint is pain right hip area. No prior surgery on hip or knees bilateral.  I do not find evidence of ED visit regarding the head injury that occurred days ago. The ecchymotic changes appear at least 1to 11days old. Plain film of L hip is reviewed by self on computer   Impression:       Traumatic acute fractures of the left femur. Displaced fracture of the lesser trochanter. Spiral fracture of the proximal femoral shaft with foreshortening. Severe osteoarthritis of the right hip and knee. Left hip prosthesis, in normal position. Orthopedics has been consulted  Based on discussion, likely for OR tomorrow      Problem list of hospitalization thus far:   Active Hospital Problems    Diagnosis     DEANNA (acute kidney injury) ([facility] [address] [N17.9]     Closed left subtrochanteric femur fracture ([address] [S72.22XA]     Closed subcapital fracture of femur with delayed healing, left [S72.012G]     Essential hypertension [I10]     Controlled type 2 diabetes mellitus, without long-term current use of insulin ([address] [E11.9]          Review of Systems: (1 system for EPF, 2-9 for detailed, 10+ for comprehensive)  Constitutional: Negative for chills and fever. HENT: Negative for dental problem, nosebleeds and rhinorrhea. Eyes: Negative for photophobia and visual disturbance. Respiratory: Negative for cough, chest tightness and shortness of breath. Cardiovascular: Negative for chest pain and leg swelling. Gastrointestinal: Negative for diarrhea, nausea and vomiting. Endocrine: Negative for polydipsia and polyphagia. Genitourinary: Negative for frequency, hematuria and urgency. Musculoskeletal: Negative for back pain and myalgias. Skin: Negative for rash. Allergic/Immunologic: Negative for food allergies. Neurological: Negative for dizziness, seizures, syncope and facial asymmetry. Hematological: Negative for adenopathy. Psychiatric/Behavioral: Negative for dysphoric mood. The patient is not nervous/anxious.              Past Medical History:   Past Medical History:   Diagnosis Date    Anemia     Colon cancer ([address]     Diabetes mellitus ([address]     not on meds    Fall     Gout     Hypercholesterolemia     Hypertension     Liver disease     Memory loss     Pulmonary embolism ([address]     Rosacea     Vitamin D deficiency        Past Surgical History:   Past Surgical History:   Procedure Laterality Date    CATARACT REMOVAL Bilateral 2010    CHOLECYSTECTOMY      COLECTOMY      DILATION AND CURETTAGE OF UTERUS      HIP ARTHROPLASTY Left 2012    LIVER RESECTION Right     TOTAL NEPHRECTOMY Left     d/t hydronephrosis       Social History:   Social History     Tobacco History     Smoking Status  Former Smoker Quit date  1/1/1980 Smoking Frequency  For 15 years Smoking Tobacco Type  Cigarettes    Smokeless Tobacco Use  Never Used          Alcohol History     Alcohol Use Status  Yes          Drug Use     Drug Use Status  Never          Sexual Activity     Sexually Active  Not Asked                Fam History: History reviewed. No pertinent family history. PFSH: The above PMHx, PSHx, SocHx, FamHx has been reviewed by myself. (1 area for detailed, 2-3 for comprehensive)      Code Status: Prior    [facility]  Prior to Admission medications    Medication Sig Start Date End Date Taking?  Authorizing Provider   furosemide (LASIX) 20 MG tablet Take 1 tablet by mouth daily 5/30/19   Susy Baez MD   Cholecalciferol 2000 units CAPS Take 1 capsule by mouth    Historical Provider, MD   ezetimibe (ZETIA) 10 MG tablet Take 10 mg by mouth 10/1/18   Historical Provider, MD   ibuprofen (ADVIL) 200 MG tablet Take 200 mg by mouth    Historical Provider, MD   rivaroxaban (XARELTO) 20 MG TABS tablet Take 20 mg by mouth 4/19/19   Historical Provider, MD   Multiple Vitamins-Minerals (MULTIVITAMIN PO) Take 1 tablet by mouth daily    Historical Provider, MD         Allergies   Allergen Reactions    Hydrochlorothiazide W-Triamterene Rash    Iodinated Diagnostic Agents Rash     IVP Dye caused a rash    Spironolactone Rash    Tobramycin Sulfate Swelling and Rash     Eye ointment caused redness & swelling             EXAM: (2-7 system for EPF/Detailed, ?8 for Comprehensive)  Pulse 81   Temp 98.7 °F (37.1 °C) (Oral)   Resp 18   Ht 5' 11\" (1.803 m)   Wt 135 lb (61.2 kg)   SpO2 96%   BMI 18.83 kg/m²   Constitutional: vitals as above: alert, appears stated age and cooperative    Head: Normocephalic, without obvious abnormality, atraumatic    Eyes:lids and lashes normal, conjunctivae and sclerae normal and pupils equal, round, reactive to light and accomodation    EMNT: external ears normal, nares midline    Neck: no carotid bruit, supple, symmetrical, trachea midline and thyroid not enlarged, symmetric, no tenderness/mass/nodules     Respiratory: clear to auscultation and percussion bilaterally with normal respiratory effort    Cardiovascular: normal rate, regular rhythm, normal S1 and S2 and no murmurs    Gastrointestinal: soft, non-tender, non-distended, normal bowel sounds, no masses or organomegaly    Extremities: no clubbing, no edema, L leg rotated  Skin:No rashes or nodules noted.     Neurologic:negative         LABS:  Labs Reviewed   CBC WITH AUTO DIFFERENTIAL - Abnormal; Notable for the following components:       Result Value    WBC 14.7 (*)     Neutrophils Absolute 12.8 (*)     Lymphocytes Absolute 0.9 (*)     All other components within normal limits    Narrative:     Performed at:  [facility] [address],  [place], [place]   Phone [phone]   COMPREHENSIVE METABOLIC PANEL W/ REFLEX TO MG FOR LOW K - Abnormal; Notable for the following components:    Glucose 212 (*)     BUN 39 (*)     CREATININE 1.5 (*)     GFR Non- 33 (*)     GFR  40 (*)     All other components within normal limits    Narrative:     Performed at:  [facility] [address], [place], [facility] [place]   Phone [phone]   PROTIME-INR - Abnormal; Notable for the following components:    Protime 13.1 (*)     INR 1.15 (*)     All other components within normal limits    Narrative:     Performed at:  [facility] [address],  [place], [facility] [place]   Phone [phone], RAPID    Narrative:     Performed at:  [facility] [address],  [place], [facility] [place]   Phone [phone]   TROPONIN    Narrative:     Performed at:  [facility]  [address] [facility] [place] [address]   Phone [phone]     Narrative:     Performed at:  [facility]  [address] [facility]  [place], [address] [place] [address]   Phone [phone]   APTT    Narrative:     Performed at:  [facility]  [address] [facility],  [place], [address]   Phone [phone]   URINE RT REFLEX TO CULTURE         IMAGING:  Imaging results from the ER have been reviewed in the computerized chart. XR FEMUR RIGHT (MIN 2 VIEWS)    Addendum Date: 12/3/2021    ADDENDUM: Correction. Acute fractures of the proximal RIGHT femur. Result Date: 12/3/2021  EXAMINATION: ONE XRAY VIEW OF THE PELVIS AND TWO XRAY VIEWS RIGHT HIP;   XRAY VIEWS OF THE RIGHT FEMUR 12/3/2021 5:02 pm COMPARISON: None. HISTORY: ORDERING SYSTEM PROVIDED HISTORY: Fall with injury, shortening and external rotation TECHNOLOGIST PROVIDED HISTORY: Reason for exam:->Fall with injury, shortening and external rotation Reason for Exam: Fall (Pt brought in by ems from [facility] for fall. Pt is c/o right leg pain. Pt has bruising to right forehead from a previous fall.) Acuity: Acute Type of Exam: Initial FINDINGS: Alignment of the pelvis is normal.  There is a left hip total prosthesis in normal alignment. No hardware complication is identified. There is severe osteoarthritis of the right hip, with pronounced joint space narrowing, subchondral sclerosis and osseous hypertrophy, including over growth of the superolateral acetabulum. There is an acute fracture of the lesser trochanter which is displaced medially by 8 mm. There is a spiral fracture of the proximal 3rd of the femoral shaft with foreshortening. The distal segment positioned laterally. There is adjacent hematoma. No fracture of the more distal femur is identified. There is severe osteoarthritis of the knee, including joint space narrowing of the medial compartment. No knee joint effusion is seen.      Traumatic acute fractures of the intravenous contrast. Multiplanar reformatted images are provided for review. Dose modulation, iterative reconstruction, and/or weight based adjustment of the mA/kV was utilized to reduce the radiation dose to as low as reasonably achievable. COMPARISON: None. HISTORY: ORDERING SYSTEM PROVIDED HISTORY: History of fall TECHNOLOGIST PROVIDED HISTORY: Reason for exam:->History of fall Decision Support Exception - unselect if not a suspected or confirmed emergency medical condition->Emergency Medical Condition (MA) Reason for Exam: Fall (Pt brought in by ems from [facility] for fall. Pt is c/o right leg pain. Pt has bruising to right forehead from a previous fall.) Acuity: Acute Type of Exam: Initial Mechanism of Injury: fall FINDINGS: BONES/ALIGNMENT: There is no acute fracture or traumatic malalignment. DEGENERATIVE CHANGES: Multilevel degenerative changes. SOFT TISSUES: There is no prevertebral soft tissue swelling. No acute abnormality of the cervical spine. XR CHEST PORTABLE    Result Date: 12/3/2021  EXAMINATION: ONE XRAY VIEW OF THE CHEST 12/3/2021 5:56 pm COMPARISON: None. HISTORY: ORDERING SYSTEM PROVIDED HISTORY: Right hip fracture, preop TECHNOLOGIST PROVIDED HISTORY: Reason for exam:->Right hip fracture, preop FINDINGS: The lungs are without acute focal process. There is no effusion or pneumothorax. The cardiomediastinal silhouette is without acute process. The osseous structures are without acute process. No acute process. XR HIP 2-3 VW W PELVIS RIGHT    Addendum Date: 12/3/2021    ADDENDUM: Correction. Acute fractures of the proximal RIGHT femur. Result Date: 12/3/2021  EXAMINATION: ONE XRAY VIEW OF THE PELVIS AND TWO XRAY VIEWS RIGHT HIP;   XRAY VIEWS OF THE RIGHT FEMUR 12/3/2021 5:02 pm COMPARISON: None.  HISTORY: ORDERING SYSTEM PROVIDED HISTORY: Fall with injury, shortening and external rotation TECHNOLOGIST PROVIDED HISTORY: Reason for exam:->Fall with injury, shortening and external rotation Reason for Exam: Fall (Pt brought in by ems from [facility] for fall. Pt is c/o right leg pain. Pt has bruising to right forehead from a previous fall.) Acuity: Acute Type of Exam: Initial FINDINGS: Alignment of the pelvis is normal.  There is a left hip total prosthesis in normal alignment. No hardware complication is identified. There is severe osteoarthritis of the right hip, with pronounced joint space narrowing, subchondral sclerosis and osseous hypertrophy, including over growth of the superolateral acetabulum. There is an acute fracture of the lesser trochanter which is displaced medially by 8 mm. There is a spiral fracture of the proximal 3rd of the femoral shaft with foreshortening. The distal segment positioned laterally. There is adjacent hematoma. No fracture of the more distal femur is identified. There is severe osteoarthritis of the knee, including joint space narrowing of the medial compartment. No knee joint effusion is seen. Traumatic acute fractures of the left femur. Displaced fracture of the lesser trochanter. Spiral fracture of the proximal femoral shaft with foreshortening. Severe osteoarthritis of the right hip and knee. Left hip prosthesis, in normal position. EKG:   EKG from ER, reviewed by self - it shows normal sinus rhythm at 93. No acute st elevation noted  Old chart reviewed, EKG dated 5/28/19 is reviewed, there is not difference noted. Old study shows normal sinus at 97    Lab Results   Component Value Date    GLUCOSE 212 12/03/2021     Lab Results   Component Value Date    POCGLU 141 06/01/2019     Pulse 81   Temp 98.7 °F (37.1 °C) (Oral)   Resp 18   Ht 5' 11\" (1.803 m)   Wt 135 lb (61.2 kg)   SpO2 96%   BMI 18.83 kg/m²     MEDICAL DECISION MAKING:    Principal Problem:    Closed left subtrochanteric femur fracture (HCC) -New Problem to me. Pt s/p mechanical fall. fx seen on plain film  Plan: admit. Iv pain meds ordered. Npo after MN. Hold xarelto.  Rapid covid done in ER. Ortho to see. Per discussion, likely OR tomorrow. Medically stable for this  Active Problems:    Controlled type 2 diabetes mellitus, without long-term current use of insulin ([address] -Established problem. Stable. Glu 212 in ER  Plan: Patient placed on controlled carbohydrate diet. Fingerstick sugars to be checked to monitor for both hypoglycemia as well as hyperglycemia. Sliding scale insulin ordered. Glucagon and dextrose ordered for hypoglycemia. Patient will be continued on home medications. Hemoglobin a1c to be ordered to assess efficacy of therapy. Essential hypertension -Established problem. Stable. Plan: Pt home BP meds reviewed and will be continued. IV Hydralazine ordered for control of extremely high blood pressures. Will monitor labs to assess Creat/K for possible complications of medications. DEANNA (acute kidney injury) ([facility] [address] -Established problem. Stable. Plan: as creat up, ivf overnight    Closed subcapital fracture of femur with delayed healing, left          Diagnoses as listed above, designated as new or established and plan outlined for each. Data Reviewed:   (1) Lab tests were reviewed or ordered. (1) Radiology tests were reviewed or ordered. (1) Medical test (Echo, EKG, PFT/joseph) were ordered. (1)History was not obtained from someone other than patient  (1) Old records were reviewed - see HPI/MDM for pertinent details if review done. (2) Case was discussed with another health care provider: CARMINA Blanca  (2) Imaging was viewed by myself. (2) EKG  was viewed by myself. The patient is being placed in inpatient status with the expectation of requiring a hospital stay spanning at least two midnights for care and treatment of the problems noted in the problem list.  This determination is also based on thepatients comorbidities and past medical history, the severity and timing of the signs and symptoms upon presentation.     (Please note that portions of this note were completed with a voice recognition program.  Efforts were made to edit the dictations but occasionally words are mis-transcribed.)      Electronically signed by: Kenny Burris MD 12/3/2021

## 2021-12-04 NOTE — PLAN OF CARE
Problem: Falls - Risk of:  Goal: Will remain free from falls  Description: Will remain free from falls  Outcome: Ongoing  Goal: Absence of physical injury  Description: Absence of physical injury  Outcome: Ongoing     Problem: Pain:  Goal: Pain level will decrease  Description: Pain level will decrease  Outcome: Ongoing  Goal: Control of acute pain  Description: Control of acute pain  Outcome: Ongoing  Goal: Control of chronic pain  Description: Control of chronic pain  Outcome: Ongoing     Problem: Skin Integrity:  Goal: Will show no infection signs and symptoms  Description: Will show no infection signs and symptoms  Outcome: Ongoing  Goal: Absence of new skin breakdown  Description: Absence of new skin breakdown  Outcome: Ongoing  Goal: Demonstration of wound healing without infection will improve  Description: Demonstration of wound healing without infection will improve  Outcome: Ongoing  Goal: Risk for impaired skin integrity will decrease  Description: Risk for impaired skin integrity will decrease  Outcome: Ongoing     Problem:  Activity:  Goal: Ability to ambulate will improve  Description: Ability to ambulate will improve  Outcome: Ongoing  Goal: Ability to perform activities at highest level will improve  Description: Ability to perform activities at highest level will improve  Outcome: Ongoing     Problem: Health Behavior:  Goal: Identification of resources available to assist in meeting health care needs will improve  Description: Identification of resources available to assist in meeting health care needs will improve  Outcome: Ongoing     Problem: Nutritional:  Goal: Ability to attain and maintain optimal nutritional status will improve  Description: Ability to attain and maintain optimal nutritional status will improve  Outcome: Ongoing     Problem: Physical Regulation:  Goal: Will remain free from infection  Description: Will remain free from infection  Outcome: Ongoing  Goal: Postoperative complications will be avoided or minimized  Description: Postoperative complications will be avoided or minimized  Outcome: Ongoing  Goal: Diagnostic test results will improve  Description: Diagnostic test results will improve  Outcome: Ongoing     Problem: Respiratory:  Goal: Ability to maintain adequate ventilation will improve  Description: Ability to maintain adequate ventilation will improve  Outcome: Ongoing     Problem: Safety:  Goal: Ability to remain free from injury will improve  Description: Ability to remain free from injury will improve  Outcome: Ongoing     Problem: Self-Care:  Goal: Ability to meet self-care needs will improve  Description: Ability to meet self-care needs will improve  Outcome: Ongoing     Problem: Self-Concept:  Goal: Ability to maintain and perform role responsibilities to the fullest extent possible will improve  Description: Ability to maintain and perform role responsibilities to the fullest extent possible will improve  Outcome: Ongoing  Goal: Verbalizations of decreased anxiety will increase  Description: Verbalizations of decreased anxiety will increase  Outcome: Ongoing     Problem: Sensory:  Goal: Pain level will decrease  Description: Pain level will decrease  Outcome: Ongoing     Problem: Tissue Perfusion:  Goal: Peripheral tissue perfusion will improve  Description: Peripheral tissue perfusion will improve  Outcome: Ongoing  Goal: Risk of venous thrombosis will decrease  Description: Risk of venous thrombosis will decrease  Outcome: Ongoing     Problem: Urinary Elimination:  Goal: Ability to reestablish a normal urinary elimination pattern will improve - after catheter removal  Description: Ability to reestablish a normal urinary elimination pattern will improve  Outcome: Ongoing

## 2021-12-04 NOTE — CONSULTS
Orthopedic surgery consult H&P    Reason for consult: Right proximal femur fracture  Consulted by: ED    History of present illness  The patient is an 56-year-old female who fell yesterday onto her right side. She was unable to bear weight. She presents to the ED where x-rays confirmed a proximal femur fracture. The patient is a poor historian however he does sound like she likely is a community ambulator.     Past Medical History:   Diagnosis Date    Anemia     [facility]     Diabetes mellitus ([facility] [facility]     not on meds    Fall     Gout     Hypercholesterolemia     Hypertension     Liver disease     Memory loss     Pulmonary embolism (HCC)     Rosacea     Vitamin D deficiency        Past Surgical History:   Procedure Laterality Date    CATARACT REMOVAL Bilateral 2010    CHOLECYSTECTOMY      COLECTOMY      DILATION AND CURETTAGE OF UTERUS      HIP ARTHROPLASTY Left 2012    LIVER RESECTION Right     TOTAL NEPHRECTOMY Left     d/t hydronephrosis       Scheduled Meds:   Vitamin D  2,000 Units Oral Daily    furosemide  20 mg Oral Daily    sodium chloride flush  5-40 mL IntraVENous 2 times per day    enoxaparin  30 mg SubCUTAneous Daily    tranexamic acid (CYCLOKAPRON) IVPB  1,000 mg IntraVENous On Call to OR    insulin lispro  0-12 Units SubCUTAneous TID     insulin lispro  0-6 Units SubCUTAneous Nightly     Continuous Infusions:   sodium chloride Stopped (12/03/21 2310)    sodium chloride 75 mL/hr at 12/04/21 0135    sodium chloride      dextrose       PRN Meds:.morphine, sodium chloride flush, sodium chloride, oxyCODONE **OR** oxyCODONE, morphine **OR** morphine, ondansetron **OR** ondansetron, magnesium hydroxide, hydrALAZINE, sodium chloride, morphine, glucose, dextrose, glucagon (rDNA), dextrose     Allergies   Allergen Reactions    Hydrochlorothiazide W-Triamterene Rash    Iodinated Diagnostic Agents Rash     IVP Dye caused a rash    Spironolactone Rash    Tobramycin Sulfate Swelling and Rash     Eye ointment caused redness & swelling          Social Connections:     Frequency of Communication with Friends and Family: Not on file    Frequency of Social Gatherings with Friends and Family: Not on file    Attends Protestant Services: Not on file    Active Member of Clubs or Organizations: Not on file    Attends Club or Organization Meetings: Not on file    Marital Status: Not on file       Vitals:    12/04/21 0715   BP: (!) 110/57   Pulse: 85   Resp: 16   Temp: 98 °F (36.7 °C)   SpO2: 91%         Physical examination  Alert, no acute distress  Right lower extremity-externally rotated, compartments are soft, gross motor and sensory function is intact, palpable DP pulse      Right hip x-rays as well as femur x-rays were reviewed. There is a spiral fracture of the subtrochanteric region with displacement. Separate displaced lesser trochanter fracture. Assessment  Right proximal femur fracture    Plan  I discussed with the patient and her niece the diagnosis and treatment options. At this point I do recommend surgical intervention in the form of intramedullary nailing of right femur. Risk were found as not limited to infection, bleeding, damage to blood vessels or nerves, need for additional surgery, nonunion, malunion, hardware failure, medical complications. They do understand like to proceed.

## 2021-12-04 NOTE — ANESTHESIA POSTPROCEDURE EVALUATION
Department of Anesthesiology  Postprocedure Note    Patient: Esau Jaimes  MRN: [id number]  YOB: 1935  Date of evaluation: 12/4/2021  Time:  3:19 PM     Procedure Summary     Date: 12/04/21 Room / Location: [address]    Anesthesia Start: 0911 Anesthesia Stop: 7998    Procedure: RIGHT HIP INTERMEDULLARY NAIL, SYNTHESE, HANA TABLE, C-ARM (Right Hip) Diagnosis: (Right hip fracture)    Surgeons: Maggy Stewart MD Responsible Provider: Kaiser Joiner MD    Anesthesia Type: general ASA Status: 3          Anesthesia Type: general    Rodney Phase I: Rodney Score: 9    Rodney Phase II:      Last vitals: Reviewed and per EMR flowsheets.        Anesthesia Post Evaluation    Patient location during evaluation: PACU  Patient participation: complete - patient participated  Level of consciousness: awake and confused (as at baseline)  Nausea & Vomiting: no nausea and no vomiting  Complications: no  Cardiovascular status: blood pressure returned to baseline  Respiratory status: acceptable  Multimodal analgesia pain management approach

## 2021-12-04 NOTE — PROGRESS NOTES
Pt arrived to PACU from OR in stable condition and is alert to physical stimuli. Pt can move extremities to command. Respirations are even on 4L O2 per NC. Skin warm, dry, and with appropriate for ethnicity color. Abd is soft. Pain is tolerable at this time.   Right hip and knee area surgical site(s) intact with dressing= staples, xeroform, 4X4, ABD, foam tape; CDI        S/P: RIGHT HIP INTERMEDULLARY NAIL with Dr. Missy Marks at [address]  BSN, RN, [facility]  PACU, Pre-op, SDS

## 2021-12-04 NOTE — OP NOTE
Operative Note      Patient: Kassie Lewis  YOB: 1935  MRN: [id number]    Date of Procedure: 12/4/2021    Pre-Op Diagnosis: Right hip fracture    Post-Op Diagnosis: Same       Procedure(s):  RIGHT HIP INTERMEDULLARY NAIL, SYNTHESE, HANA TABLE, C-ARM    Surgeon(s):  Jorden Castillo MD    Assistant:   Surgical Assistant: Betina Kim    Anesthesia: General    Estimated Blood Loss (mL): 980     Complications: None    Specimens:   * No specimens in log *    Implants:  Implant Name Type Inv. Item Serial No.  Lot No. LRB No. Used Action   SCREW BNE L48MM DIA5MM LAT FEM LT GRN TI ST ANGELIA FULL THRD  SCREW BNE L48MM DIA5MM LAT FEM LT GRN TI ST AN[place]ELIA FULL THRD  DEPUY SYNTHES [employer] [id number] Right 1 Implanted   SCREW BNE L90MM DIA10.35MM PROX FEM G TI FLORA FOR TFN ADV  SCREW BNE L90MM DIA10.35MM PROX FEM G TI FLORA FOR TFN ADV  DEPUY [employer]WD [id number] Right 1 Implanted   NAIL IM L400MM [id number] 130DEG LNG R PROX FEM GRN TI FLORA  NAIL IM L400MM [id number] 130DEG LNG R PROX FEM GRN TI FLORA  DEPUY SYNTHES USA-WD [id number] Right 1 Implanted   SCREW BNE L52MM DIA5MM ST TIB LT GRN TI ST FLORA ANGELIA FULL  SCREW BNE L52MM DIA5MM ST TIB LT GRN TI ST FLORA ANGELIA FULL  DEPUY SYNTHES USA-WD [id number] Right 1 Implanted         Drains:   Urethral Catheter Non-latex (Active)   Catheter Indications Perioperative use for selected surgical procedures 12/04/21 0755   Site Assessment [place] 12/04/21 0755   Urine Color Yellow 12/04/21 1300   Urine Appearance Clear 12/04/21 1300   Urine Odor Malodorous 12/04/21 0755   Output (mL) 200 mL 12/04/21 0143       Findings: per dictation    Detailed Description of Procedure:    The patient is an 81-year-old male who presents to the ED overnight following a fall onto the right side.  She was unable to bear weight.  X-rays demonstrated a proximal femur fracture. I saw and examined the patient. Taylor Bell was well-appearing in no apparent distress.  She was neurovascularly intact throughout the lower extremity and the compartments were soft.  Given the nature of the injury I did recommend surgical intervention in form of intramedullary nailing of the right femur.  The risks were defined as but not limited to infection, bleeding, damage to blood vessels and nerves, need for additional surgery.  Patient and family did understand and elected to proceed.     Procedure  The patient was seen and evaluated in the preoperative area.  Informed consent was obtained.  The operative site was marked.  She was taken back to the operative room. Leatha Flood was performed. Rolanda Morris was transferred to the [facility] table.  The right lower extremity was placed in the traction boot and was but well padded.  The well leg was placed in the hemilithotomy position and was well padded.  SCDs were placed in the bilateral lower extremities.      Anatomic reduction was achieved.  At that point the operative site was prepped and draped.  A formal timeout was performed was correct patient surgical site and procedure was confirmed.  Due to the fracture pattern there was significant displacement with deforming forces present. However we elected to proceed with our entry point to gain access to the proximal fragment. Initial incision was made 3 to 5 cm proximal to the tip the greater trochanter. Inserted our guidewire and attempted to manipulate the proximal fragment in order to get the guidewire to pass to the fracture from site into the distal fragment however had extreme difficulty due to the displacement and deformity forces. As result we elected to perform an open lateral incision. A 10 cm incision was made followed by dissection through subtendinous tissue. The fascial layer was incised. Vastus lateralis was elevated anteriorly. We encountered the fracture. There is significant displacement and spiral nature of the fracture but were able to place a reduction clamp to obtain preliminary reduction.   Following this the guidewire was placed past the fracture site into the distal fragment towards the knee. In addition AP and lateral views of the proximal femur demonstrated anatomic reduction. This was followed by the entry reamer.  Anatomic reduction was maintained on AP and lateral fluoroscopic views.  We then sequentially reamed to a 12.5 mm reamer. We called for implant.  This was the Synthes TFN.  We utilized the long nail.  This was a 11 mm x 400 mm.  It was placed in the desired position.  Additional incision was made laterally and a triple sleeve guide was placed.  Following this a guide wire was driven into the center center position of the femoral head and neck region.  This was measured.  We elected to proceed with a lag screw measuring 10 mm x 90 mm.  Drilling was performed.  The screw was successfully placed.  Proximal set screw was locked.  Additional distal incision was made laterally and we drilled using perfect [place] technique for the distal interlock screws.  Distal interlock screws measured 5 x 48 mm as well as 5 x 52 mm.   Excellent purchase was obtained.    The insertion handle was removed.  Fluoroscopic views in the AP and lateral directions were obtained. Pranav Obrien is anatomic restoration of the proximal femur and implant was in excellent position.  Tip apex distance was less than 25 mm.  Following this the wounds were thoroughly irrigated with sterile saline solution.  Fascia was closed using #1 Vicryl.  Subtenons layer was closed using 2-0 Vicryl in buried fashion.  Skin was closed using staples.  Dressing was applied in the form of Xeroform, 4 x 4's, ABD, and foam tape.  She was successfully extubated taken recovery room in excellent condition.  In the procedure all counts were correct and I was present for the entire case.  Postoperatively the patient will be weight-bear as tolerated.      Of note, since this was a subtrochanteric proximal femur fracture requiring open incision it was a significantly more challenging technically procedure than a standard intramedullary nail of the femur requiring an additional 100% of operative time.       Electronically signed by Kailash Razo MD on 12/4/2021 at 1:27 PM

## 2021-12-04 NOTE — PROGRESS NOTES
Physical Therapy  Patient s/p fall w/ displaced right lesser trochanteric fx. She has not yet been evaluated by orthopedics. Anticipate surgical intervention. Will hold awaiting ortho recommendations.   David Lutz PT, DPT, ATC-R [id number]

## 2021-12-04 NOTE — PROGRESS NOTES
2100: Assessment complete, VSS, pulses weak but intact to LITTLE LE, pt denies numbness or tingling, pt RLE obvious deformity and shortened compared to the left, pt did not tolerate rolling in the bed, abrasion noted to pt forehead, (healing), bruising to R side of pt face (healing), new R elbow abrasion, applied mepilex, fall precautions in place, pt disoriented to place and situation, reoriented, pt has some appropriate responses but still confused, states pain has decreased since not moving, now, 3+ edema to RLE, 2+ to LLE, and 1+ to LITTLE UE lung clear, pt has hx of CHF, discussed care plan, call light in reach, fall precautions in place, will continue monitoring. 2200: Talked with Chris RAMOS, completed admission and med req, pt resting in bed, no s/s of distress, will continue monitoring. 0130: pt denies pain reoriented pt on time, place and situation, updated pt on plan to have surgery to fix \"hip\", pt voiced understanding. 7361: pt yelling out for help to get oob and for water, reoriented pt, reminded she has a broken \"hip\" and she will have surgery today, repositioned pt in bed, swabbed mouth and gave morphine for pain, see WINSTON Reyes RN

## 2021-12-04 NOTE — PROGRESS NOTES
Progress Note - Dr. Sparkle Zheng - Internal Medicine  PCP: Alysha Vieira [address] [facility] Faraz Mehta / [place] [address] Day: 1  Code Status: Full Code  Current Diet: Diet NPO        CC: follow up on medical issues    Subjective:   Silverio Reynolds is a 81 y.o. female. Pt seen and examined  Chart reviewed since last visit, labs and imaging below        Going down for OR today  Plan for nailing of femur    She denies chest pain, denies shortness of breath, denies nausea,  denies emesis. 10 system Review of Systems is reviewed with patient, and pertinent positives are noted in HPI above . Otherwise, Review of systems is negative. I have reviewed the patient's medical and social history in detail and updated the computerized patient record. To recap: She  has a past medical history of Anemia, Colon cancer ([address] Diabetes mellitus ([address] Fall, Gout, Hypercholesterolemia, Hypertension, Liver disease, Memory loss, Pulmonary embolism ([address] Rosacea, and Vitamin D deficiency. . She  has a past surgical history that includes Cataract removal (Bilateral, 2010); Cholecystectomy; Dilation and curettage of uterus; Liver Resection (Right); Hip Arthroplasty (Left, 2012); total nephrectomy (Left); and colectomy. . She  reports that she quit smoking about 41 years ago. Her smoking use included cigarettes. She quit after 15.00 years of use. She has never used smokeless tobacco. She reports current alcohol use. She reports that she does not use drugs. .        Active Hospital Problems    Diagnosis Date Noted    DEANNA (acute kidney injury) ([address] [N17.9] 12/03/2021    Closed left subtrochanteric femur fracture ([address] [S72.22XA] 12/03/2021    Closed subcapital fracture of femur with delayed healing, left [S72.012G] 12/03/2021    Essential hypertension [I10] 05/30/2019    Controlled type 2 diabetes mellitus, without long-term current use of insulin ([address] [E11.9] 05/30/2019       Current Facility-Administered Medications: 0.9 % sodium chloride infusion, 1,000 mL, IntraVENous, Continuous  morphine (PF) injection 2 mg, 2 mg, IntraVENous, Q3H PRN  Vitamin D (CHOLECALCIFEROL) tablet 2,000 Units, 2,000 Units, Oral, Daily  furosemide (LASIX) tablet 20 mg, 20 mg, Oral, Daily  0.9 % sodium chloride infusion, , IntraVENous, Continuous  sodium chloride flush 0.9 % injection 5-40 mL, 5-40 mL, IntraVENous, 2 times per day  sodium chloride flush 0.9 % injection 10 mL, 10 mL, IntraVENous, PRN  0.9 % sodium chloride infusion, 25 mL, IntraVENous, PRN  oxyCODONE (ROXICODONE) immediate release tablet 5 mg, 5 mg, Oral, Q4H PRN **OR** oxyCODONE (ROXICODONE) immediate release tablet 10 mg, 10 mg, Oral, Q4H PRN  morphine (PF) injection 2 mg, 2 mg, IntraVENous, Q2H PRN **OR** morphine injection 4 mg, 4 mg, IntraVENous, Q2H PRN  enoxaparin (LOVENOX) injection 30 mg, 30 mg, SubCUTAneous, Daily  ondansetron (ZOFRAN-ODT) disintegrating tablet 4 mg, 4 mg, Oral, Q8H PRN **OR** ondansetron (ZOFRAN) injection 4 mg, 4 mg, IntraVENous, Q6H PRN  magnesium hydroxide (MILK OF MAGNESIA) 400 MG/5ML suspension 30 mL, 30 mL, Oral, Daily PRN  tranexamic acid (CYKLOKAPRON) 1,000 mg in dextrose 5 % 100 mL IVPB, 1,000 mg, IntraVENous, On Call to OR  hydrALAZINE (APRESOLINE) injection 10 mg, 10 mg, IntraVENous, Q6H PRN  0.9 % sodium chloride bolus, 500 mL, IntraVENous, PRN  morphine (PF) injection 1 mg, 1 mg, IntraVENous, Q4H PRN  insulin lispro (1 Unit Dial) 0-12 Units, 0-12 Units, SubCUTAneous, TID WC  insulin lispro (1 Unit Dial) 0-6 Units, 0-6 Units, SubCUTAneous, Nightly  glucose (GLUTOSE) 40 % oral gel 15 g, 15 g, Oral, PRN  dextrose 50 % IV solution, 12.5 g, IntraVENous, PRN  glucagon (rDNA) injection 1 mg, 1 mg, IntraMUSCular, PRN  dextrose 5 % solution, 100 mL/hr, IntraVENous, PRN  Facility-Administered Medications Ordered in Other Encounters: ceFAZolin (ANCEF) injection, , IntraVENous, PRN  ketamine (KETALAR) injection, , IntraVENous, PRN  propofol injection, , IntraVENous, PRN  lidocaine PF 2 % injection, , IntraVENous, PRN  ondansetron (ZOFRAN) injection, , IntraVENous, PRN  dexamethasone (PF) (DECADRON) injection, , IntraVENous, PRN         Objective:  BP (!) 110/57   Pulse 85   Temp 98 °F (36.7 °C) (Oral)   Resp 16   Ht 5' 11\" (1.803 m)   Wt 206 lb 14.4 oz (93.8 kg)   LMP  (LMP Unknown) Comment: post-menopausal  SpO2 91%   BMI 28.86 kg/m²      Patient Vitals for the past 24 hrs:   BP Temp Temp src Pulse Resp SpO2 Height Weight   12/04/21 0715 (!) 110/57 98 °F (36.7 °C) Oral 85 16 91 % -- --   12/04/21 0530 112/63 98.3 °F (36.8 °C) Axillary 95 16 94 % -- --   12/03/21 2330 116/75 98.1 °F (36.7 °C) Oral 96 16 96 % -- --   12/03/21 2051 -- -- -- -- -- -- -- 206 lb 14.4 oz (93.8 kg)   12/03/21 2045 117/65 98 °F (36.7 °C) Axillary 89 17 95 % -- --   12/03/21 2000 (!) 132/54 -- -- -- -- -- -- --   12/03/21 1959 -- -- -- -- -- 99 % -- --   12/03/21 1946 (!) 116/51 -- -- -- -- (!) 84 % -- --   12/03/21 1607 -- 98.7 °F (37.1 °C) Oral 81 18 96 % 5' 11\" (1.803 m) 135 lb (61.2 kg)     Patient Vitals for the past 96 hrs (Last 3 readings):   Weight   12/03/21 2051 206 lb 14.4 oz (93.8 kg)   12/03/21 1607 135 lb (61.2 kg)           Intake/Output Summary (Last 24 hours) at 12/4/2021 0946  Last data filed at 12/4/2021 0143  Gross per 24 hour   Intake --   Output 200 ml   Net -200 ml         Physical Exam:   Vitals as above  General appearance: alert, appears stated age and cooperative    Head: Normocephalic, without obvious abnormality, atraumatic    Lungs: clear to auscultation bilaterally    Heart: regular rate and rhythm, S1, S2 normal, no murmur    Abdomen: soft, non-tender; bowel sounds normal; no masses, no organomegaly     Extremities: no edema    Labs:  Lab Results   Component Value Date    WBC 16.6 (H) 12/04/2021    HGB 10.2 (L) 12/04/2021    HCT 32.0 (L) 12/04/2021     12/04/2021    ALT 11 12/04/2021    AST 19 12/04/2021  12/04/2021    K 5.2 (H) 12/04/2021     12/04/2021    CREATININE 1.5 (H) 12/04/2021    BUN 42 (H) 12/04/2021    CO2 23 12/04/2021    INR 1.15 (H) 12/03/2021    LABMICR YES 12/03/2021     Lab Results   Component Value Date    TROPONINI <0.01 12/03/2021       Recent Imaging Results are Reviewed:  XR FEMUR RIGHT (MIN 2 VIEWS)    Addendum Date: 12/3/2021    ADDENDUM: Correction. Acute fractures of the proximal RIGHT femur. Result Date: 12/3/2021  EXAMINATION: ONE XRAY VIEW OF THE PELVIS AND TWO XRAY VIEWS RIGHT HIP;   XRAY VIEWS OF THE RIGHT FEMUR 12/3/2021 5:02 pm COMPARISON: None. HISTORY: ORDERING SYSTEM PROVIDED HISTORY: Fall with injury, shortening and external rotation TECHNOLOGIST PROVIDED HISTORY: Reason for exam:->Fall with injury, shortening and external rotation Reason for Exam: Fall (Pt brought in by ems from [facility] for fall. Pt is c/o right leg pain. Pt has bruising to right forehead from a previous fall.) Acuity: Acute Type of Exam: Initial FINDINGS: Alignment of the pelvis is normal.  There is a left hip total prosthesis in normal alignment. No hardware complication is identified. There is severe osteoarthritis of the right hip, with pronounced joint space narrowing, subchondral sclerosis and osseous hypertrophy, including over growth of the superolateral acetabulum. There is an acute fracture of the lesser trochanter which is displaced medially by 8 mm. There is a spiral fracture of the proximal 3rd of the femoral shaft with foreshortening. The distal segment positioned laterally. There is adjacent hematoma. No fracture of the more distal femur is identified. There is severe osteoarthritis of the knee, including joint space narrowing of the medial compartment. No knee joint effusion is seen. Traumatic acute fractures of the left femur. Displaced fracture of the lesser trochanter. Spiral fracture of the proximal femoral shaft with foreshortening.  Severe osteoarthritis of the right hip and knee. Left hip prosthesis, in normal position. CT Head WO Contrast    Result Date: 12/3/2021  EXAMINATION: CT OF THE HEAD WITHOUT CONTRAST  12/3/2021 6:05 pm TECHNIQUE: CT of the head was performed without the administration of intravenous contrast. Dose modulation, iterative reconstruction, and/or weight based adjustment of the mA/kV was utilized to reduce the radiation dose to as low as reasonably achievable. COMPARISON: 05/28/2019 HISTORY: ORDERING SYSTEM PROVIDED HISTORY: History of fall TECHNOLOGIST PROVIDED HISTORY: Reason for exam:->History of fall Has a \"code stroke\" or \"stroke alert\" been called? ->No Decision Support Exception - unselect if not a suspected or confirmed emergency medical condition->Emergency Medical Condition (MA) Reason for Exam: Fall (Pt brought in by ems from [facility] for fall. Pt is c/o right leg pain. Pt has bruising to right forehead from a previous fall.) Acuity: Acute Type of Exam: Initial Mechanism of Injury: fall FINDINGS: BRAIN/VENTRICLES: The ventricles and sulci are diffusely enlarged. Low attenuation is seen in the periventricular and subcortical white matter. No acute intracranial hemorrhage or acute infarct is identified. ORBITS: The visualized portion of the orbits demonstrate no acute abnormality. SINUSES: The visualized paranasal sinuses and mastoid air cells demonstrate no acute abnormality. SOFT TISSUES/SKULL:  No acute abnormality of the visualized skull or soft tissues. No acute intracranial abnormality. Diffuse atrophic changes with findings suggesting chronic microvascular ischemia     CT Cervical Spine WO Contrast    Result Date: 12/3/2021  EXAMINATION: CT OF THE CERVICAL SPINE WITHOUT CONTRAST 12/3/2021 6:05 pm TECHNIQUE: CT of the cervical spine was performed without the administration of intravenous contrast. Multiplanar reformatted images are provided for review.  Dose modulation, iterative reconstruction, and/or weight based adjustment of the mA/kV was utilized to reduce the radiation dose to as low as reasonably achievable. COMPARISON: None. HISTORY: ORDERING SYSTEM PROVIDED HISTORY: History of fall TECHNOLOGIST PROVIDED HISTORY: Reason for exam:->History of fall Decision Support Exception - unselect if not a suspected or confirmed emergency medical condition->Emergency Medical Condition (MA) Reason for Exam: Fall (Pt brought in by ems from [facility] for fall. Pt is c/o right leg pain. Pt has bruising to right forehead from a previous fall.) Acuity: Acute Type of Exam: Initial Mechanism of Injury: fall FINDINGS: BONES/ALIGNMENT: There is no acute fracture or traumatic malalignment. DEGENERATIVE CHANGES: Multilevel degenerative changes. SOFT TISSUES: There is no prevertebral soft tissue swelling. No acute abnormality of the cervical spine. XR CHEST PORTABLE    Result Date: 12/3/2021  EXAMINATION: ONE XRAY VIEW OF THE CHEST 12/3/2021 5:56 pm COMPARISON: None. HISTORY: ORDERING SYSTEM PROVIDED HISTORY: Right hip fracture, preop TECHNOLOGIST PROVIDED HISTORY: Reason for exam:->Right hip fracture, preop FINDINGS: The lungs are without acute focal process. There is no effusion or pneumothorax. The cardiomediastinal silhouette is without acute process. The osseous structures are without acute process. No acute process. XR HIP 2-3 VW W PELVIS RIGHT    Addendum Date: 12/3/2021    ADDENDUM: Correction. Acute fractures of the proximal RIGHT femur. Result Date: 12/3/2021  EXAMINATION: ONE XRAY VIEW OF THE PELVIS AND TWO XRAY VIEWS RIGHT HIP;   XRAY VIEWS OF THE RIGHT FEMUR 12/3/2021 5:02 pm COMPARISON: None. HISTORY: ORDERING SYSTEM PROVIDED HISTORY: Fall with injury, shortening and external rotation TECHNOLOGIST PROVIDED HISTORY: Reason for exam:->Fall with injury, shortening and external rotation Reason for Exam: Fall (Pt brought in by ems from [facility] for fall. Pt is c/o right leg pain.  Pt has bruising to right forehead from a previous fall.) Acuity: Acute Type of Exam: Initial FINDINGS: Alignment of the pelvis is normal.  There is a left hip total prosthesis in normal alignment. No hardware complication is identified. There is severe osteoarthritis of the right hip, with pronounced joint space narrowing, subchondral sclerosis and osseous hypertrophy, including over growth of the superolateral acetabulum. There is an acute fracture of the lesser trochanter which is displaced medially by 8 mm. There is a spiral fracture of the proximal 3rd of the femoral shaft with foreshortening. The distal segment positioned laterally. There is adjacent hematoma. No fracture of the more distal femur is identified. There is severe osteoarthritis of the knee, including joint space narrowing of the medial compartment. No knee joint effusion is seen. Traumatic acute fractures of the left femur. Displaced fracture of the lesser trochanter. Spiral fracture of the proximal femoral shaft with foreshortening. Severe osteoarthritis of the right hip and knee. Left hip prosthesis, in normal position. Lab Results   Component Value Date    GLUCOSE 220 12/04/2021     Lab Results   Component Value Date    POCGLU 233 12/04/2021     BP (!) 110/57   Pulse 85   Temp 98 °F (36.7 °C) (Oral)   Resp 16   Ht 5' 11\" (1.803 m)   Wt 206 lb 14.4 oz (93.8 kg)   LMP  (LMP Unknown) Comment: post-menopausal  SpO2 91%   BMI 28.86 kg/m²     Assessment and Plan:  Principal Problem:    Closed left subtrochanteric femur fracture ([address] -Established problem. Stable. Plan: plan for or this am  Active Problems:    Controlled type 2 diabetes mellitus, without long-term current use of insulin ([address] -Established problem. Stable. fsbs 233  Plan: cont ccc diet post op, sliding scale, home meds    Essential hypertension -Established problem. Stable. 110/57  Plan: cont home meds    DEANNA (acute kidney injury) ([address] -Established problem. Stable.   Creat 1.5   Plan: cont fluids    Closed subcapital fracture of femur with delayed healing, left      (Please note that portions of this note were completed with a voice recognition program.  Efforts were made to edit the dictations but occasionally words are mis-transcribed.)        Sallie Henley MD  12/4/2021

## 2021-12-04 NOTE — ED NOTES
Report called to 4T.  RN verbalized understanding and denied any need for further information, patient to be transported to unit      Kyle Hopper RN  12/03/21 2012

## 2021-12-05 ENCOUNTER — APPOINTMENT (OUTPATIENT)
Dept: GENERAL RADIOLOGY | Age: 86
DRG: 480 | End: 2021-12-05
Payer: MEDICARE

## 2021-12-05 LAB
ANION GAP SERPL CALCULATED.3IONS-SCNC: 13 MMOL/L (ref 3–16)
BASOPHILS ABSOLUTE: 0 K/UL (ref 0–0.2)
BASOPHILS RELATIVE PERCENT: 0.1 %
BLOOD BANK DISPENSE STATUS: NORMAL
BLOOD BANK DISPENSE STATUS: NORMAL
BLOOD BANK PRODUCT CODE: NORMAL
BLOOD BANK PRODUCT CODE: NORMAL
BPU ID: NORMAL
BPU ID: NORMAL
BUN BLDV-MCNC: 47 MG/DL (ref 7–20)
CALCIUM SERPL-MCNC: 7.9 MG/DL (ref 8.3–10.6)
CHLORIDE BLD-SCNC: 103 MMOL/L (ref 99–110)
CO2: 22 MMOL/L (ref 21–32)
CREAT SERPL-MCNC: 2.2 MG/DL (ref 0.6–1.2)
DESCRIPTION BLOOD BANK: NORMAL
DESCRIPTION BLOOD BANK: NORMAL
EOSINOPHILS ABSOLUTE: 0 K/UL (ref 0–0.6)
EOSINOPHILS RELATIVE PERCENT: 0 %
GFR AFRICAN AMERICAN: 26
GFR NON-AFRICAN AMERICAN: 21
GLUCOSE BLD-MCNC: 185 MG/DL (ref 70–99)
GLUCOSE BLD-MCNC: 205 MG/DL (ref 70–99)
GLUCOSE BLD-MCNC: 227 MG/DL (ref 70–99)
GLUCOSE BLD-MCNC: 240 MG/DL (ref 70–99)
GLUCOSE BLD-MCNC: 275 MG/DL (ref 70–99)
HCT VFR BLD CALC: 24.4 % (ref 36–48)
HEMOGLOBIN: 8.1 G/DL (ref 12–16)
LYMPHOCYTES ABSOLUTE: 0.9 K/UL (ref 1–5.1)
LYMPHOCYTES RELATIVE PERCENT: 7 %
MCH RBC QN AUTO: 31.7 PG (ref 26–34)
MCHC RBC AUTO-ENTMCNC: 33.2 G/DL (ref 31–36)
MCV RBC AUTO: 95.4 FL (ref 80–100)
MONOCYTES ABSOLUTE: 1.2 K/UL (ref 0–1.3)
MONOCYTES RELATIVE PERCENT: 9.2 %
NEUTROPHILS ABSOLUTE: 11.1 K/UL (ref 1.7–7.7)
NEUTROPHILS RELATIVE PERCENT: 83.7 %
PDW BLD-RTO: 15.2 % (ref 12.4–15.4)
PERFORMED ON: ABNORMAL
PLATELET # BLD: 163 K/UL (ref 135–450)
PMV BLD AUTO: 8.4 FL (ref 5–10.5)
POTASSIUM SERPL-SCNC: 4.6 MMOL/L (ref 3.5–5.1)
RBC # BLD: 2.55 M/UL (ref 4–5.2)
SODIUM BLD-SCNC: 138 MMOL/L (ref 136–145)
WBC # BLD: 13.2 K/UL (ref 4–11)

## 2021-12-05 PROCEDURE — 36430 TRANSFUSION BLD/BLD COMPNT: CPT

## 2021-12-05 PROCEDURE — 71045 X-RAY EXAM CHEST 1 VIEW: CPT

## 2021-12-05 PROCEDURE — 85025 COMPLETE CBC W/AUTO DIFF WBC: CPT

## 2021-12-05 PROCEDURE — 97162 PT EVAL MOD COMPLEX 30 MIN: CPT

## 2021-12-05 PROCEDURE — 97535 SELF CARE MNGMENT TRAINING: CPT

## 2021-12-05 PROCEDURE — 6370000000 HC RX 637 (ALT 250 FOR IP): Performed by: ORTHOPAEDIC SURGERY

## 2021-12-05 PROCEDURE — 97530 THERAPEUTIC ACTIVITIES: CPT

## 2021-12-05 PROCEDURE — 6370000000 HC RX 637 (ALT 250 FOR IP): Performed by: INTERNAL MEDICINE

## 2021-12-05 PROCEDURE — 1200000000 HC SEMI PRIVATE

## 2021-12-05 PROCEDURE — 80048 BASIC METABOLIC PNL TOTAL CA: CPT

## 2021-12-05 PROCEDURE — 97166 OT EVAL MOD COMPLEX 45 MIN: CPT

## 2021-12-05 PROCEDURE — 6360000002 HC RX W HCPCS: Performed by: ORTHOPAEDIC SURGERY

## 2021-12-05 PROCEDURE — P9016 RBC LEUKOCYTES REDUCED: HCPCS

## 2021-12-05 PROCEDURE — 97110 THERAPEUTIC EXERCISES: CPT

## 2021-12-05 RX ORDER — INSULIN LISPRO 100 [IU]/ML
0-18 INJECTION, SOLUTION INTRAVENOUS; SUBCUTANEOUS
Status: DISCONTINUED | OUTPATIENT
Start: 2021-12-05 | End: 2021-12-09 | Stop reason: HOSPADM

## 2021-12-05 RX ORDER — INSULIN LISPRO 100 [IU]/ML
0-9 INJECTION, SOLUTION INTRAVENOUS; SUBCUTANEOUS NIGHTLY
Status: DISCONTINUED | OUTPATIENT
Start: 2021-12-05 | End: 2021-12-09 | Stop reason: HOSPADM

## 2021-12-05 RX ADMIN — INSULIN LISPRO 6 UNITS: 100 INJECTION, SOLUTION INTRAVENOUS; SUBCUTANEOUS at 16:30

## 2021-12-05 RX ADMIN — SENNOSIDES AND DOCUSATE SODIUM 1 TABLET: 50; 8.6 TABLET ORAL at 20:42

## 2021-12-05 RX ADMIN — QUETIAPINE FUMARATE 100 MG: 100 TABLET ORAL at 20:42

## 2021-12-05 RX ADMIN — POTASSIUM CHLORIDE 20 MEQ: 20 TABLET, EXTENDED RELEASE ORAL at 08:00

## 2021-12-05 RX ADMIN — Medication 2000 UNITS: at 07:42

## 2021-12-05 RX ADMIN — BISACODYL 5 MG: 5 TABLET, COATED ORAL at 07:42

## 2021-12-05 RX ADMIN — INSULIN LISPRO 2 UNITS: 100 INJECTION, SOLUTION INTRAVENOUS; SUBCUTANEOUS at 20:47

## 2021-12-05 RX ADMIN — POLYETHYLENE GLYCOL 3350 17 G: 17 POWDER, FOR SOLUTION ORAL at 07:43

## 2021-12-05 RX ADMIN — SENNOSIDES AND DOCUSATE SODIUM 1 TABLET: 50; 8.6 TABLET ORAL at 07:42

## 2021-12-05 RX ADMIN — LACTULOSE 20 G: 20 SOLUTION ORAL at 20:42

## 2021-12-05 RX ADMIN — ENOXAPARIN SODIUM 40 MG: 100 INJECTION SUBCUTANEOUS at 07:43

## 2021-12-05 RX ADMIN — INSULIN LISPRO 4 UNITS: 100 INJECTION, SOLUTION INTRAVENOUS; SUBCUTANEOUS at 07:49

## 2021-12-05 RX ADMIN — LACTULOSE 20 G: 20 SOLUTION ORAL at 07:42

## 2021-12-05 RX ADMIN — QUETIAPINE FUMARATE 100 MG: 100 TABLET ORAL at 07:42

## 2021-12-05 RX ADMIN — CEFAZOLIN 2000 MG: 10 INJECTION, POWDER, FOR SOLUTION INTRAVENOUS at 04:51

## 2021-12-05 RX ADMIN — ACETAMINOPHEN 650 MG: 325 TABLET ORAL at 20:43

## 2021-12-05 RX ADMIN — INSULIN LISPRO 6 UNITS: 100 INJECTION, SOLUTION INTRAVENOUS; SUBCUTANEOUS at 11:30

## 2021-12-05 ASSESSMENT — PAIN SCALES - PAIN ASSESSMENT IN ADVANCED DEMENTIA (PAINAD)
FACIALEXPRESSION: 0
BODYLANGUAGE: 0
TOTALSCORE: 0
BREATHING: 0
BREATHING: 0
NEGVOCALIZATION: 0
CONSOLABILITY: 0
CONSOLABILITY: 0
FACIALEXPRESSION: 1
FACIALEXPRESSION: 0
TOTALSCORE: 0
CONSOLABILITY: 1
NEGVOCALIZATION: 0
BODYLANGUAGE: 1
BODYLANGUAGE: 0
NEGVOCALIZATION: 1
BREATHING: 0
TOTALSCORE: 4

## 2021-12-05 ASSESSMENT — PAIN DESCRIPTION - ORIENTATION: ORIENTATION: RIGHT

## 2021-12-05 ASSESSMENT — PAIN DESCRIPTION - PAIN TYPE: TYPE: ACUTE PAIN;SURGICAL PAIN

## 2021-12-05 ASSESSMENT — PAIN DESCRIPTION - DESCRIPTORS: DESCRIPTORS: ACHING

## 2021-12-05 ASSESSMENT — PAIN DESCRIPTION - LOCATION: LOCATION: LEG;HIP

## 2021-12-05 ASSESSMENT — PAIN SCALES - GENERAL
PAINLEVEL_OUTOF10: 0
PAINLEVEL_OUTOF10: 4

## 2021-12-05 ASSESSMENT — PAIN DESCRIPTION - FREQUENCY: FREQUENCY: CONTINUOUS

## 2021-12-05 NOTE — PROGRESS NOTES
Occupational Therapy   Occupational Therapy Initial Assessment  Date: 2021   Patient Name: Thong Echavarria  MRN: [id number]     : 1935    Date of Service: 2021    Discharge Recommendations:    Thong Echavarria scored a  on the AM-PAC ADL Inpatient form. Current research shows that an AM-PAC score of 17 or less is typically not associated with a discharge to the patient's home setting. Based on the patient's AM-PAC score and their current ADL deficits, it is recommended that the patient have 3-5 sessions per week of Occupational Therapy at d/c to increase the patient's independence. Please see assessment section for further patient specific details. If patient discharges prior to next session this note will serve as a discharge summary. Please see below for the latest assessment towards goals. OT Equipment Recommendations  Equipment Needed: No  Other: defer to next level of care    Assessment   Performance deficits / Impairments: Decreased functional mobility ; Decreased endurance; Decreased ADL status; Decreased cognition; Decreased strength; Decreased safe awareness; Decreased balance  Assessment: Pt is currently functioning below occupational baseline and demo the deficits listed above. Pt is currently requiring max(A)x2 for all bed mobility and unable to assess standing activities and ADLs this date d/t decreased BP. Pt is significantly limited by pain and decreased weightbearing through RLE. Xenia Pugajosefina  Pt would benefit from continued skilled OT services to address these deficits and increase IND, safety, and ease with all occupational pursuits  Treatment Diagnosis: Decreased ADL status, functional mobility, and functional transfers d/t Closed left subtrochanteric femur fracture ([facility] [address]  Prognosis: Good; Fair  Decision Making: Medium Complexity  OT Education: OT Role; Plan of Care  Patient Education: pt is not an IND learner and will require reinforcement  Barriers to Learning: cognition  REQUIRES OT FOLLOW UP: Yes  Activity Tolerance  Activity Tolerance: Patient Tolerated treatment well; Patient limited by fatigue; Treatment limited secondary to decreased cognition; Treatment limited secondary to medical complications (free text)  Activity Tolerance: BP while seated EOB decreasing to 96/57 while seated EOB pt symptomatic and returned to supine BP while supine 104/61 . RN notified and aware  Safety Devices  Safety Devices in place: Yes  Type of devices: All fall risk precautions in place; Patient at risk for falls; Left in bed; Bed alarm in place; Nurse notified; Call light within reach           Patient Diagnosis(es): The primary encounter diagnosis was Closed displaced fracture of lesser trochanter of right femur, initial encounter ([facility] [address] Diagnoses of Fall on same level from slipping, tripping or stumbling, initial encounter and Frequent falls were also pertinent to this visit. has a past medical history of Anemia, Colon cancer ([facility] [address] [facility] Fall, Gout, Hypercholesterolemia, Hypertension, Liver disease, Memory loss, Pulmonary embolism ([facility] Rosacea, and Vitamin D deficiency. has a past surgical history that includes Cataract removal (Bilateral, 2010); Cholecystectomy; Dilation and curettage of uterus; Liver Resection (Right); Hip Arthroplasty (Left, 2012); total nephrectomy (Left); and colectomy.     Treatment Diagnosis: Decreased ADL status, functional mobility, and functional transfers d/t Closed left subtrochanteric femur fracture ([facility])      Restrictions  Restrictions/Precautions  Restrictions/Precautions: Fall Risk, Weight Bearing (high fall risk, 50% WB RLE)  Required Braces or Orthoses?: No  Lower Extremity Weight Bearing Restrictions  Right Lower Extremity Weight Bearing: Partial Weight Bearing  Partial Weight Bearing Percentage Or Pounds: 50%  Position Activity Restriction  Other position/activity restrictions: Patient s/p fall w/ R hip pain, diagnosed w/ right proximal need for assistance; Decreased awareness of need for safety  Problem Solving: Decreased awareness of errors; Assistance required to identify errors made; Assistance required to generate solutions; Assistance required to implement solutions; Assistance required to correct errors made  Insights: Decreased awareness of deficits  Initiation: Requires cues for all  Sequencing: Requires cues for all  Sensation  Overall Sensation Status: WFL (unable to formally assess but responding to touch)  LUE PROM (degrees)  LUE PROM: WFL  Left Hand AROM (degrees)  Left Hand AROM: WFL  RUE PROM (degrees)  RUE PROM: WFL  Right Hand AROM (degrees)  Right Hand AROM: WFL   RUE/LUE AROM appeared WFL when reaching for HR on bed, however, pt not following commands/easily distracted to formally assess                   Plan   Plan  Times per week: 7x/wk  Times per day: Daily  Current Treatment Recommendations: Strengthening, Balance Training, Functional Mobility Training, Endurance Training, Patient/Caregiver Education & Training, Safety Education & Training, Self-Care / ADL, Cognitive Reorientation    G-Code     OutComes Score                                                  AM-PAC Score        AM-[facility] Inpatient Daily Activity Raw Score: 11 (12/05/21 0904)  AM-PAC Inpatient ADL T-Scale Score : 29.04 (12/05/21 0904)  ADL Inpatient CMS 0-100% Score: 70.42 (12/05/21 0904)  ADL Inpatient CMS G-Code Modifier : CL (12/05/21 0904)    Goals  Short term goals  Time Frame for Short term goals: d/c  Short term goal 1: Pt will complete functional transfer to ADL surface at max(A)x1  Short term goal 2: Pt will complete bed mobility with max(A)x1  Short term goal 3: Pt will tolerate 50% weight bearing through RLE while seated upright and completing ADL task with SBA  Short term goal 4: Pt will complete UB ADLs with min(A)  Short term goal 5: pt will complete LB ADLs with max(A)x1  Long term goals  Time Frame for Long term goals : LTG=STG  Patient Goals Patient goals : pt did not state       Therapy Time   Individual Concurrent Group Co-treatment   Time In [address] [place] [address]         Time Out 0832         Minutes 53         Timed Code Treatment Minutes: [id number] [facility],[address], [address] [place] [address] [id number]

## 2021-12-05 NOTE — PROGRESS NOTES
12/4/21 2000: Shift assessment complete. Alert, oriented to self, disoriented x3. Vital signs stable. Afebrile. Surgical dressing to Right hip with breakthrough drainage. New pressure dressing applied. Surgical incision C/D/I, well approximated with staples. 8 small holes noted laterally to incision with drainage. Will monitor. Ice pack applied PRN. Scheduled night medications administered & medicated for pain. Repositioned and lockhart care provided. Blood infusing. Fall precautions in place and call light within reach. Will continue with plan of care. 12/4/21 2310: Patient removed peripheral IV. Blood transfusion paused. 12/5/21 0020: New peripheral IV inserted. Telephone order from Dr. Estefania Becerra for wrist restraints until blood transfusion complete. POA called and notified of temporary restraints & she is agreeable with POC. Soft wrist restraints applied. Blood transfusing.  Vital signs stable

## 2021-12-05 NOTE — PROGRESS NOTES
8:05 AM  Morning assessment complete and am meds given. Patient disoriented. Dressing to right hip with lots of drainage. Dressing changed. Therapy in to work with patient. Speech garbled. Call light in reach and bed alarm on.   12:26 PM  Patient up to the chair using the lift. Set up to eat breakfast. Specialty bed placed in room. 3:41 PM  Patient assisted back to the bed with the lift. She tolerated it well. Bed alarm on and call light in reach.   5:29 PM  Dressing changed and patient repositioned in the bed. 85% on RA. Placed on 3 liters.  Sats back up to 96%

## 2021-12-05 NOTE — PROGRESS NOTES
Physical Therapy    Facility/Department: [address] ORTHO/NEURO NURSING  Initial Assessment    NAME: Mendel Cobia  : 1935  MRN: [id number]    Date of Service: 2021    Discharge Recommendations:  Mendel Cobia scored a 8/24 on the AM-PAC short mobility form. Current research shows that an AM-PAC score of 17 or less is typically not associated with a discharge to the patient's home setting. Based on the patient's AM-PAC score and their current functional mobility deficits, it is recommended that the patient have 3-5 sessions per week of Physical Therapy at d/c to increase the patient's independence. Please see assessment section for further patient specific details. If patient discharges prior to next session this note will serve as a discharge summary. Please see below for the latest assessment towards goals. 24 hour supervision or assist, 3-5 sessions per week   PT Equipment Recommendations  Equipment Needed: No  Other: Defer to next level of care. Assessment   Body structures, Functions, Activity limitations: Decreased functional mobility ; Increased pain; Decreased ADL status; Decreased balance; Decreased posture; Decreased ROM; Decreased strength; Decreased endurance  Assessment: Patient's PLOF is unknown although assumed to be ambulatory given fall and injury. She presently requires assist x2 for all mobility but is unable to stand or ambulate. She will need 24 hour assist and continued therapy at d/c. Treatment Diagnosis: functional mobility deficits secondary to acute injury  Prognosis: Fair  Decision Making: Medium Complexity  History: As noted. Exam: ROM, MMT, functional mobility assessment  Clinical Presentation: Stable. PT Education: Goals; PT Role; Plan of Care; Transfer Training; General Safety; Weight-bearing Education; Functional Mobility Training  Patient Education: Patient unable to verbalize understanding d/t cognitive deficits. Will need reinforcement.   Barriers to Learning: cognition  REQUIRES PT FOLLOW UP: Yes  Activity Tolerance  Activity Tolerance: Patient limited by pain; Patient limited by cognitive status; Treatment limited secondary to medical complications (free text)  Activity Tolerance: Likely orthostatic hypotension, could not confirm as BP would not take. Patient Diagnosis(es): The primary encounter diagnosis was Closed displaced fracture of lesser trochanter of right femur, initial encounter ([facility] [address] Diagnoses of Fall on same level from slipping, tripping or stumbling, initial encounter and Frequent falls were also pertinent to this visit. has a past medical history of Anemia, Colon cancer ([facility] [address] Diabetes mellitus ([facility] [address] Fall, Gout, Hypercholesterolemia, Hypertension, Liver disease, Memory loss, Pulmonary embolism ([facility] [address] Rosacea, and Vitamin D deficiency. has a past surgical history that includes Cataract removal (Bilateral, 2010); Cholecystectomy; Dilation and curettage of uterus; Liver Resection (Right); Hip Arthroplasty (Left, 2012); total nephrectomy (Left); and colectomy. Restrictions  Restrictions/Precautions  Restrictions/Precautions: Fall Risk, Weight Bearing (high fall risk, 50% WB RLE)  Required Braces or Orthoses?: No  Lower Extremity Weight Bearing Restrictions  Right Lower Extremity Weight Bearing: Partial Weight Bearing  Partial Weight Bearing Percentage Or Pounds: 50%  Position Activity Restriction  Other position/activity restrictions: Patient s/p fall w/ R hip pain, diagnosed w/ right proximal femur fx s/p IM nail 12/4/2021, 50% WB RLE. Vision/Hearing  Vision: Within Functional Limits  Hearing: Within functional limits       Subjective  General  Chart Reviewed: Yes  Patient assessed for rehabilitation services?: Yes  Response To Previous Treatment: Not applicable  Family / Caregiver Present: No  Diagnosis: right proximal femur fx s/p IM nail 12/4/2021, 50% WB RLE.   Follows Commands: Within Functional Limits  General Comment  Comments: Patient supine in bed, confused, oriented only to self. Subjective  Subjective: Patient speaking non-sensical jargon, able to be redirected for only a few seconds but responses remain off tangential  Pain Screening  Patient Currently in Pain: Denies (denies pain at rest but increased pain with movement)  Pain Assessment  Pain Assessment: Advanced Dementia  Pain Type: Acute pain; Surgical pain  Pain Location: Leg; Hip  Pain Orientation: Right  Pain Descriptors: Aching  Pain Frequency: Continuous  Non-Pharmaceutical Pain Intervention(s): Repositioned  Vital Signs  Patient Currently in Pain: Yes     Orientation  Orientation  Overall Orientation Status: Impaired  Orientation Level: Disoriented to time; Disoriented to place; Disoriented to situation; Oriented to person (When told she fell and had surgery, patient states, \"No.  I didn't have surgery. I was given a formula. \")     Social/Functional History  Social/Functional History  Type of Home: Facility  Active : No  Additional Comments: Prior level of function is unknown, attempted to call Eufemia Smithcassidy without response. Patient oriented to self only, unable to give history. Objective  Observation/Palpation  Observation: RLE surgical dressing in place,. patient has ecchymosis in periorbital area. PROM RLE (degrees)  RLE General PROM: Patient able to perform hip/knee flexion during supine heel slides to approximately 30 degrees, limited by pain. AROM RLE (degrees)  RLE General AROM: Ankle WFL. Patient unable to perform any AROM hip/knee mobility. PROM LLE (degrees)  LLE PROM: WNL  AROM LLE (degrees)  LLE AROM : WNL  Strength RLE  Comment: Grossly 2-/5.   Strength LLE  Strength LLE: WFL        Bed mobility  Rolling to Left: 2 Person assistance; Dependent/Total  Rolling to Right: Dependent/Total; 2 Person assistance  Supine to Sit: Dependent/Total; 2 Person assistance  Sit to Supine: Dependent/Total; 2 Person assistance  Scooting: Dependent/Total; 2 Person assistance  Comment: Patient unable to initiate mobility, requires assist x2 for all mobility, requires increased time and calm, explicit instructions to perform without agitation or resistance. Transfers  Comment: Patient became pale in sitting, unable to obtain BP. Patient sat for approximately 10 minutes before returning supine. Ambulation  Ambulation?: No  Stairs/Curb  Stairs?: No     Balance  Posture: Fair  Sitting - Static: Fair  Sitting - Dynamic: Poor  Exercises  Comments: Patient able to perform LAQs in sitting x4 through partial ROM, supine heel slides AAROM x5 limited by patient resistance, BLE ankle pumps x5. Patient with difficulty following directions. Plan   Plan  Times per week: 7  Current Treatment Recommendations: Strengthening, Home Exercise Program, ROM, Safety Education & Training, Balance Training, Endurance Training, Patient/Caregiver Education & Training, Functional Mobility Training, Transfer Training, Gait Training, Equipment Evaluation, Education, & procurement  Safety Devices  Type of devices: All fall risk precautions in place, Call light within reach, Bed alarm in place, Patient at risk for falls, Left in bed, Nurse notified  Restraints  Initially in place: No    AM-PAC Score  AM-PAC Inpatient Mobility Raw Score : 8 (12/05/21 0834)  AM-PAC Inpatient T-Scale Score : 28.52 (12/05/21 0834)  Mobility Inpatient CMS 0-100% Score: 86.62 (12/05/21 0834)  Mobility Inpatient CMS G-Code Modifier : CM (12/05/21 4583)     Goals  Short term goals  Time Frame for Short term goals: Discharge. Short term goal 1: Patient will perform bed mobility w/ MOD assist.  Short term goal 2: Patient will transfer supine-sit w/ MOD assist.  Short term goal 3: Patient will perform sit-stand w/ MAX assist w/ LRAD vs. Marliss Sever maintaining 50% WB status. Short term goal 4: Patient will transfer bed-chair w/ MAX assist maintaining 50% WB status.   Short term goal 5: Patient will transfer bed-chair w/ slideboard and MAX assist.  Patient Goals   Patient goals : Unable to verbalize.      Therapy Time   Individual Concurrent Group Co-treatment   Time In 0638         Time Out 0832         Minutes 53         Timed Code Treatment Minutes: [address], [place], DPT, ATC-R [id number]

## 2021-12-05 NOTE — PLAN OF CARE
infection  Outcome: Met This Shift  Goal: Postoperative complications will be avoided or minimized  Description: Postoperative complications will be avoided or minimized  Outcome: Met This Shift  Goal: Diagnostic test results will improve  Description: Diagnostic test results will improve  Outcome: Met This Shift     Problem: Respiratory:  Goal: Ability to maintain adequate ventilation will improve  Description: Ability to maintain adequate ventilation will improve  Outcome: Ongoing     Problem: Safety:  Goal: Ability to remain free from injury will improve  Description: Ability to remain free from injury will improve  Outcome: Ongoing     Problem: Self-Care:  Goal: Ability to meet self-care needs will improve  Description: Ability to meet self-care needs will improve  Outcome: Ongoing     Problem: Self-Concept:  Goal: Ability to maintain and perform role responsibilities to the fullest extent possible will improve  Description: Ability to maintain and perform role responsibilities to the fullest extent possible will improve  Outcome: Ongoing  Goal: Verbalizations of decreased anxiety will increase  Description: Verbalizations of decreased anxiety will increase  Outcome: Ongoing     Problem: Sensory:  Goal: Pain level will decrease  Description: Pain level will decrease  Outcome: Ongoing     Problem: Tissue Perfusion:  Goal: Peripheral tissue perfusion will improve  Description: Peripheral tissue perfusion will improve  Outcome: Ongoing  Goal: Risk of venous thrombosis will decrease  Description: Risk of venous thrombosis will decrease  Outcome: Ongoing     Problem: Urinary Elimination:  Goal: Ability to reestablish a normal urinary elimination pattern will improve - after catheter removal  Description: Ability to reestablish a normal urinary elimination pattern will improve  Outcome: Ongoing     Problem: Non-Violent Restraints  Goal: Removal from restraints as soon as assessed to be safe  Outcome: Ongoing  Goal: No harm/injury to patient while restraints in use  Outcome: Ongoing  Goal: Patient's dignity will be maintained  Outcome: Ongoing

## 2021-12-05 NOTE — PROGRESS NOTES
Progress Note - Dr. Ronald Crouch - Internal Medicine  PCP: Catarina Gunter [address] [facility] [address] / [address] [facility] [address] [facility]: 2  Code Status: Full Code  Current Diet: ADULT DIET; Regular        CC: follow up on medical issues    Subjective:   Philip Tanner is a 81 y.o. female. Pt seen and examined  Chart reviewed since last visit, labs and imaging below      Tolerated OR ok yest  Some hypotension noted on return to floor post op yest    Improved with fluids    Creat now 2.2 (think this may be due to hypotension yest)    She denies chest pain, denies shortness of breath, denies nausea,  denies emesis. 10 system Review of Systems is reviewed with patient, and pertinent positives are noted in HPI above . Otherwise, Review of systems is negative. I have reviewed the patient's medical and social history in detail and updated the computerized patient record. To recap: She  has a past medical history of Anemia, Colon cancer ([facility] [address] Diabetes mellitus ([address] Fall, Gout, Hypercholesterolemia, Hypertension, Liver disease, Memory loss, Pulmonary embolism ([address] Rosacea, and Vitamin D deficiency. . She  has a past surgical history that includes Cataract removal (Bilateral, 2010); Cholecystectomy; Dilation and curettage of uterus; Liver Resection (Right); Hip Arthroplasty (Left, 2012); total nephrectomy (Left); and colectomy. . She  reports that she quit smoking about 41 years ago. Her smoking use included cigarettes. She quit after 15.00 years of use. She has never used smokeless tobacco. She reports current alcohol use. She reports that she does not use drugs. .        Active Hospital Problems    Diagnosis Date Noted    DEANNA (acute kidney injury) ([facility] [address] [N17.9] 12/03/2021    Closed left subtrochanteric femur fracture ([facility] [address] [S72.22XA] 12/03/2021    Closed subcapital fracture of femur with delayed healing, left [S72.012G] 12/03/2021    Essential hypertension [I10] 05/30/2019    Controlled type 2 diabetes mellitus, without long-term current use of insulin ([facility] [E11.9] 05/30/2019       Current Facility-Administered Medications: oxyCODONE (ROXICODONE) immediate release tablet 5 mg, 5 mg, Oral, Q4H PRN  HYDROmorphone HCl PF (DILAUDID) injection 0.5 mg, 0.5 mg, IntraVENous, Q3H PRN  ondansetron (ZOFRAN) injection 4 mg, 4 mg, IntraVENous, Q6H PRN  enoxaparin (LOVENOX) injection 40 mg, 40 mg, SubCUTAneous, Daily  polyethylene glycol (GLYCOLAX) packet 17 g, 17 g, Oral, Daily  bisacodyl (DULCOLAX) EC tablet 5 mg, 5 mg, Oral, Daily  sennosides-docusate sodium (SENOKOT-S) 8.6-50 MG tablet 1 tablet, 1 tablet, Oral, BID  acetaminophen (TYLENOL) tablet 650 mg, 650 mg, Oral, Q4H PRN  0.9 % sodium chloride infusion, , IntraVENous, PRN  lactulose (CHRONULAC) 10 GM/15ML solution 20 g, 20 g, Oral, BID  LORazepam (ATIVAN) tablet 1 mg, 1 mg, Oral, BID PRN  potassium chloride (KLOR-CON M) extended release tablet 20 mEq, 20 mEq, Oral, Daily with breakfast  QUEtiapine (SEROQUEL) tablet 100 mg, 100 mg, Oral, BID  torsemide (DEMADEX) tablet 60 mg, 60 mg, Oral, Daily  morphine (PF) injection 2 mg, 2 mg, IntraVENous, Q3H PRN  Vitamin D (CHOLECALCIFEROL) tablet 2,000 Units, 2,000 Units, Oral, Daily  0.9 % sodium chloride infusion, , IntraVENous, Continuous  sodium chloride flush 0.9 % injection 5-40 mL, 5-40 mL, IntraVENous, 2 times per day  sodium chloride flush 0.9 % injection 10 mL, 10 mL, IntraVENous, PRN  0.9 % sodium chloride infusion, 25 mL, IntraVENous, PRN  morphine (PF) injection 2 mg, 2 mg, IntraVENous, Q2H PRN **OR** morphine injection 4 mg, 4 mg, IntraVENous, Q2H PRN  ondansetron (ZOFRAN-ODT) disintegrating tablet 4 mg, 4 mg, Oral, Q8H PRN **OR** [DISCONTINUED] ondansetron (ZOFRAN) injection 4 mg, 4 mg, IntraVENous, Q6H PRN  magnesium hydroxide (MILK OF MAGNESIA) 400 MG/5ML suspension 30 mL, 30 mL, Oral, Daily PRN  hydrALAZINE (APRESOLINE) injection 10 mg, 10 mg, IntraVENous, Q6H PRN  0.9 % sodium chloride bolus, 500 mL, IntraVENous, PRN  morphine (PF) injection 1 mg, 1 mg, IntraVENous, Q4H PRN  insulin lispro (1 Unit Dial) 0-12 Units, 0-12 Units, SubCUTAneous, TID WC  insulin lispro (1 Unit Dial) 0-6 Units, 0-6 Units, SubCUTAneous, Nightly  glucose (GLUTOSE) 40 % oral gel 15 g, 15 g, Oral, PRN  dextrose 50 % IV solution, 12.5 g, IntraVENous, PRN  glucagon (rDNA) injection 1 mg, 1 mg, IntraMUSCular, PRN  dextrose 5 % solution, 100 mL/hr, IntraVENous, PRN         Objective:  BP (!) 106/51   Pulse 103   Temp 98.1 °F (36.7 °C) (Oral)   Resp 18   Ht 5' 11\" (1.803 m)   Wt 215 lb (97.5 kg)   LMP  (LMP Unknown) Comment: post-menopausal  SpO2 93%   BMI 29.99 kg/m²      Patient Vitals for the past 24 hrs:   BP Temp Temp src Pulse Resp SpO2 Weight   12/05/21 0730 (!) 106/51 -- -- 103 18 93 % --   12/05/21 0600 -- -- -- -- -- -- 215 lb (97.5 kg)   12/05/21 0400 121/64 98.1 °F (36.7 °C) Oral 91 16 94 % --   12/05/21 0304 121/69 -- -- 97 16 97 % --   12/05/21 0230 127/67 97.7 °F (36.5 °C) Axillary 83 18 96 % --   12/05/21 0130 118/63 98.6 °F (37 °C) Axillary 89 17 98 % --   12/05/21 0030 (!) 115/59 97.8 °F (36.6 °C) Axillary 98 18 94 % --   12/05/21 0015 104/60 98 °F (36.7 °C) Oral 99 18 97 % --   12/05/21 0009 (!) 105/55 98.1 °F (36.7 °C) -- 106 18 94 % --   12/04/21 2200 (!) 115/58 97.7 °F (36.5 °C) Axillary 103 18 98 % --   12/04/21 2000 (!) 114/55 97.6 °F (36.4 °C) Oral 96 22 97 % --   12/04/21 1930 (!) 124/58 97.6 °F (36.4 °C) Oral 96 16 98 % --   12/04/21 1911 (!) 107/54 97.4 °F (36.3 °C) Temporal 94 16 -- --   12/04/21 1632 (!) 110/52 97.2 °F (36.2 °C) Oral 73 16 99 % --   12/04/21 1426 (!) 101/53 -- -- 70 -- 100 % --   12/04/21 1424 (!) 97/47 -- -- 69 -- -- --   12/04/21 1415 (!) 102/50 -- -- -- -- -- --   12/04/21 1408 (!) 93/49 97.2 °F (36.2 °C) -- 70 16 95 % --   12/04/21 1400 (!) 76/50 -- -- 68 16 99 % --   12/04/21 1344 (!) 81/48 -- -- -- -- -- --   12/04/21 1340 (!) 75/46 97.4 °F (36.3 °C) -- -- -- -- --   12/04/21 1330 (!) 74/54 -- -- 77 14 -- --   12/04/21 1316 (!) 85/46 97.4 °F (36.3 °C) Oral 81 14 98 % --   12/04/21 1300 96/73 -- -- 79 20 97 % --   12/04/21 1255 (!) 93/42 -- -- 75 18 97 % --   12/04/21 1250 (!) 97/42 -- -- 73 21 100 % --   12/04/21 1245 (!) 97/42 -- -- 73 20 100 % --   12/04/21 1240 (!) 93/36 96.9 °F (36.1 °C) Temporal 71 20 100 % --   12/04/21 1235 (!) 103/32 -- -- 70 19 100 % --   12/04/21 1230 (!) 107/46 -- -- 69 21 100 % --   12/04/21 1223 (!) 71/39 96.9 °F (36.1 °C) Temporal 72 21 100 % --     Patient Vitals for the past 96 hrs (Last 3 readings):   Weight   12/05/21 0600 215 lb (97.5 kg)   12/03/21 2051 206 lb 14.4 oz (93.8 kg)   12/03/21 1607 135 lb (61.2 kg)           Intake/Output Summary (Last 24 hours) at 12/5/2021 1059  Last data filed at 12/5/2021 6205  Gross per 24 hour   Intake 3371 ml   Output 1170 ml   Net 2201 ml         Physical Exam:   Vitals as above  General appearance: alert, appears stated age and cooperative    Head: Normocephalic, without obvious abnormality, atraumatic    Lungs: clear to auscultation bilaterally    Heart: regular rate and rhythm, S1, S2 normal, no murmur    Abdomen: soft, non-tender; bowel sounds normal; no masses, no organomegaly    Extremities: extremities normal, atraumatic, no cyanosis, no edema ; dressing CDI    Labs:  Lab Results   Component Value Date    WBC 13.2 (H) 12/05/2021    HGB 8.1 (L) 12/05/2021    HCT 24.4 (L) 12/05/2021     12/05/2021    ALT 11 12/04/2021    AST 19 12/04/2021     12/05/2021    K 4.6 12/05/2021     12/05/2021    CREATININE 2.2 (H) 12/05/2021    BUN 47 (H) 12/05/2021    CO2 22 12/05/2021    INR 1.15 (H) 12/03/2021    LABA1C 7.0 12/03/2021    LABMICR YES 12/03/2021     Lab Results   Component Value Date    TROPONINI <0.01 12/03/2021       Recent Imaging Results are Reviewed:  XR HIP RIGHT (2-3 VIEWS)    Result Date: 12/4/2021  Radiology exam is complete. No Radiologist dictation. Please follow up with ordering provider. and/or weight based adjustment of the mA/kV was utilized to reduce the radiation dose to as low as reasonably achievable. COMPARISON: 05/28/2019 HISTORY: ORDERING SYSTEM PROVIDED HISTORY: History of fall TECHNOLOGIST PROVIDED HISTORY: Reason for exam:->History of fall Has a \"code stroke\" or \"stroke alert\" been called? ->No Decision Support Exception - unselect if not a suspected or confirmed emergency medical condition->Emergency Medical Condition (MA) Reason for Exam: Fall (Pt brought in by ems from [facility] for fall. Pt is c/o right leg pain. Pt has bruising to right forehead from a previous fall.) Acuity: Acute Type of Exam: Initial Mechanism of Injury: fall FINDINGS: BRAIN/VENTRICLES: The ventricles and sulci are diffusely enlarged. Low attenuation is seen in the periventricular and subcortical white matter. No acute intracranial hemorrhage or acute infarct is identified. ORBITS: The visualized portion of the orbits demonstrate no acute abnormality. SINUSES: The visualized paranasal sinuses and mastoid air cells demonstrate no acute abnormality. SOFT TISSUES/SKULL:  No acute abnormality of the visualized skull or soft tissues. No acute intracranial abnormality. Diffuse atrophic changes with findings suggesting chronic microvascular ischemia     CT Cervical Spine WO Contrast    Result Date: 12/3/2021  EXAMINATION: CT OF THE CERVICAL SPINE WITHOUT CONTRAST 12/3/2021 6:05 pm TECHNIQUE: CT of the cervical spine was performed without the administration of intravenous contrast. Multiplanar reformatted images are provided for review. Dose modulation, iterative reconstruction, and/or weight based adjustment of the mA/kV was utilized to reduce the radiation dose to as low as reasonably achievable. COMPARISON: None.  HISTORY: ORDERING SYSTEM PROVIDED HISTORY: History of fall TECHNOLOGIST PROVIDED HISTORY: Reason for exam:->History of fall Decision Support Exception - unselect if not a suspected or confirmed emergency for transfusion. Cont to monitor h/h to assess progression of anemia. Recommend ferrous sulfate or MVI as outpatient.        (Please note that portions of this note were completed with a voice recognition program.  Efforts were made to edit the dictations but occasionally words are mis-transcribed.)        Matt Miller MD  12/5/2021

## 2021-12-05 NOTE — PROGRESS NOTES
Orthopedic surgery progress note    Subjective  Patient received 2 units of RBCs yesterday due to a hemoglobin of 6.9. Currently lying in bed    Objective  Vitals:    12/05/21 1115   BP: 110/62   Pulse: 102   Resp: 16   Temp: 97.7 °F (36.5 °C)   SpO2: 93%       Physical examination  No acute distress, patient is alert  Right lower extremity-compartments are soft, dressing is clean/dry/intact, gross motor and sensory function is intact in the foot as well perfused, no calf swelling or tenderness    Hemoglobin-8.1    Assessment  Status post intramedullary nailing of right femur on 12/4/2021    Plan  The patient is to be 50% weightbearing. Continue physical therapy and Occupational Therapy. Lovenox 40 mg daily for DVT prophylaxis. Daily dressing changes starting tomorrow with nursing.   The patient should follow-up with me 4 weeks following discharge from hospital.

## 2021-12-06 LAB
ANION GAP SERPL CALCULATED.3IONS-SCNC: 9 MMOL/L (ref 3–16)
BASE EXCESS ARTERIAL: 1.5 MMOL/L (ref -3–3)
BASOPHILS ABSOLUTE: 0 K/UL (ref 0–0.2)
BASOPHILS RELATIVE PERCENT: 0.2 %
BLOOD BANK DISPENSE STATUS: NORMAL
BLOOD BANK PRODUCT CODE: NORMAL
BPU ID: NORMAL
BUN BLDV-MCNC: 46 MG/DL (ref 7–20)
CALCIUM SERPL-MCNC: 7.8 MG/DL (ref 8.3–10.6)
CARBOXYHEMOGLOBIN ARTERIAL: 1.7 % (ref 0–1.5)
CHLORIDE BLD-SCNC: 107 MMOL/L (ref 99–110)
CO2: 23 MMOL/L (ref 21–32)
CREAT SERPL-MCNC: 1.4 MG/DL (ref 0.6–1.2)
D DIMER: 668 NG/ML DDU (ref 0–229)
DESCRIPTION BLOOD BANK: NORMAL
EOSINOPHILS ABSOLUTE: 0 K/UL (ref 0–0.6)
EOSINOPHILS RELATIVE PERCENT: 0.1 %
GFR AFRICAN AMERICAN: 43
GFR NON-AFRICAN AMERICAN: 36
GLUCOSE BLD-MCNC: 208 MG/DL (ref 70–99)
GLUCOSE BLD-MCNC: 210 MG/DL (ref 70–99)
GLUCOSE BLD-MCNC: 216 MG/DL (ref 70–99)
GLUCOSE BLD-MCNC: 223 MG/DL (ref 70–99)
GLUCOSE BLD-MCNC: 250 MG/DL (ref 70–99)
HCO3 ARTERIAL: 25.9 MMOL/L (ref 21–29)
HCT VFR BLD CALC: 20.1 % (ref 36–48)
HCT VFR BLD CALC: 21.3 % (ref 36–48)
HEMOGLOBIN, ART, EXTENDED: 7.3 G/DL (ref 12–16)
HEMOGLOBIN: 6.8 G/DL (ref 12–16)
HEMOGLOBIN: 7.3 G/DL (ref 12–16)
LYMPHOCYTES ABSOLUTE: 0.7 K/UL (ref 1–5.1)
LYMPHOCYTES RELATIVE PERCENT: 7.1 %
MCH RBC QN AUTO: 32.2 PG (ref 26–34)
MCHC RBC AUTO-ENTMCNC: 33.6 G/DL (ref 31–36)
MCV RBC AUTO: 95.9 FL (ref 80–100)
METHEMOGLOBIN ARTERIAL: <0 %
MONOCYTES ABSOLUTE: 1 K/UL (ref 0–1.3)
MONOCYTES RELATIVE PERCENT: 9.5 %
NEUTROPHILS ABSOLUTE: 8.5 K/UL (ref 1.7–7.7)
NEUTROPHILS RELATIVE PERCENT: 83.1 %
O2 SAT, ARTERIAL: >100 %
O2 THERAPY: ABNORMAL
ORGANISM: ABNORMAL
PCO2 ARTERIAL: 39.1 MMHG (ref 35–45)
PDW BLD-RTO: 15.2 % (ref 12.4–15.4)
PERFORMED ON: ABNORMAL
PH ARTERIAL: 7.43 (ref 7.35–7.45)
PLATELET # BLD: 136 K/UL (ref 135–450)
PMV BLD AUTO: 8.4 FL (ref 5–10.5)
PO2 ARTERIAL: 150 MMHG (ref 75–108)
POTASSIUM SERPL-SCNC: 4.5 MMOL/L (ref 3.5–5.1)
RBC # BLD: 2.09 M/UL (ref 4–5.2)
SODIUM BLD-SCNC: 139 MMOL/L (ref 136–145)
TCO2 ARTERIAL: 60.8 MMOL/L
TROPONIN: <0.01 NG/ML
URINE CULTURE, ROUTINE: ABNORMAL
WBC # BLD: 10.2 K/UL (ref 4–11)

## 2021-12-06 PROCEDURE — P9016 RBC LEUKOCYTES REDUCED: HCPCS

## 2021-12-06 PROCEDURE — 6370000000 HC RX 637 (ALT 250 FOR IP): Performed by: INTERNAL MEDICINE

## 2021-12-06 PROCEDURE — 6370000000 HC RX 637 (ALT 250 FOR IP): Performed by: ORTHOPAEDIC SURGERY

## 2021-12-06 PROCEDURE — 02HV33Z INSERTION OF INFUSION DEVICE INTO SUPERIOR VENA CAVA, PERCUTANEOUS APPROACH: ICD-10-PCS | Performed by: INTERNAL MEDICINE

## 2021-12-06 PROCEDURE — 85014 HEMATOCRIT: CPT

## 2021-12-06 PROCEDURE — 36569 INSJ PICC 5 YR+ W/O IMAGING: CPT

## 2021-12-06 PROCEDURE — 80048 BASIC METABOLIC PNL TOTAL CA: CPT

## 2021-12-06 PROCEDURE — APPNB45 APP NON BILLABLE 31-45 MINUTES: Performed by: NURSE PRACTITIONER

## 2021-12-06 PROCEDURE — 82803 BLOOD GASES ANY COMBINATION: CPT

## 2021-12-06 PROCEDURE — 99024 POSTOP FOLLOW-UP VISIT: CPT | Performed by: NURSE PRACTITIONER

## 2021-12-06 PROCEDURE — 85018 HEMOGLOBIN: CPT

## 2021-12-06 PROCEDURE — 93005 ELECTROCARDIOGRAM TRACING: CPT | Performed by: INTERNAL MEDICINE

## 2021-12-06 PROCEDURE — 36415 COLL VENOUS BLD VENIPUNCTURE: CPT

## 2021-12-06 PROCEDURE — 85379 FIBRIN DEGRADATION QUANT: CPT

## 2021-12-06 PROCEDURE — 92610 EVALUATE SWALLOWING FUNCTION: CPT

## 2021-12-06 PROCEDURE — 92526 ORAL FUNCTION THERAPY: CPT

## 2021-12-06 PROCEDURE — 1200000000 HC SEMI PRIVATE

## 2021-12-06 PROCEDURE — 36600 WITHDRAWAL OF ARTERIAL BLOOD: CPT

## 2021-12-06 PROCEDURE — C1751 CATH, INF, PER/CENT/MIDLINE: HCPCS

## 2021-12-06 PROCEDURE — 85025 COMPLETE CBC W/AUTO DIFF WBC: CPT

## 2021-12-06 PROCEDURE — 36430 TRANSFUSION BLD/BLD COMPNT: CPT

## 2021-12-06 PROCEDURE — 84484 ASSAY OF TROPONIN QUANT: CPT

## 2021-12-06 RX ORDER — INSULIN LISPRO 100 [IU]/ML
0-12 INJECTION, SOLUTION INTRAVENOUS; SUBCUTANEOUS
Status: DISCONTINUED | OUTPATIENT
Start: 2021-12-06 | End: 2021-12-06

## 2021-12-06 RX ORDER — SODIUM CHLORIDE 0.9 % (FLUSH) 0.9 %
5-40 SYRINGE (ML) INJECTION PRN
Status: DISCONTINUED | OUTPATIENT
Start: 2021-12-06 | End: 2021-12-06 | Stop reason: SDUPTHER

## 2021-12-06 RX ORDER — DEXTROSE MONOHYDRATE 50 MG/ML
100 INJECTION, SOLUTION INTRAVENOUS PRN
Status: DISCONTINUED | OUTPATIENT
Start: 2021-12-06 | End: 2021-12-09 | Stop reason: HOSPADM

## 2021-12-06 RX ORDER — LIDOCAINE HYDROCHLORIDE 10 MG/ML
5 INJECTION, SOLUTION EPIDURAL; INFILTRATION; INTRACAUDAL; PERINEURAL ONCE
Status: DISCONTINUED | OUTPATIENT
Start: 2021-12-06 | End: 2021-12-09 | Stop reason: HOSPADM

## 2021-12-06 RX ORDER — DEXTROSE MONOHYDRATE 25 G/50ML
12.5 INJECTION, SOLUTION INTRAVENOUS PRN
Status: DISCONTINUED | OUTPATIENT
Start: 2021-12-06 | End: 2021-12-09 | Stop reason: HOSPADM

## 2021-12-06 RX ORDER — SODIUM CHLORIDE 9 MG/ML
25 INJECTION, SOLUTION INTRAVENOUS PRN
Status: DISCONTINUED | OUTPATIENT
Start: 2021-12-06 | End: 2021-12-06 | Stop reason: SDUPTHER

## 2021-12-06 RX ORDER — INSULIN LISPRO 100 [IU]/ML
0-6 INJECTION, SOLUTION INTRAVENOUS; SUBCUTANEOUS NIGHTLY
Status: DISCONTINUED | OUTPATIENT
Start: 2021-12-06 | End: 2021-12-06

## 2021-12-06 RX ORDER — SODIUM CHLORIDE 9 MG/ML
INJECTION, SOLUTION INTRAVENOUS PRN
Status: DISCONTINUED | OUTPATIENT
Start: 2021-12-06 | End: 2021-12-09 | Stop reason: HOSPADM

## 2021-12-06 RX ORDER — NICOTINE POLACRILEX 4 MG
15 LOZENGE BUCCAL PRN
Status: DISCONTINUED | OUTPATIENT
Start: 2021-12-06 | End: 2021-12-09 | Stop reason: HOSPADM

## 2021-12-06 RX ORDER — SODIUM CHLORIDE 0.9 % (FLUSH) 0.9 %
5-40 SYRINGE (ML) INJECTION EVERY 12 HOURS SCHEDULED
Status: DISCONTINUED | OUTPATIENT
Start: 2021-12-06 | End: 2021-12-06 | Stop reason: SDUPTHER

## 2021-12-06 RX ORDER — OXYCODONE HYDROCHLORIDE 5 MG/1
5 TABLET ORAL EVERY 6 HOURS PRN
Qty: 12 TABLET | Refills: 0 | Status: SHIPPED | OUTPATIENT
Start: 2021-12-06 | End: 2021-12-09

## 2021-12-06 RX ADMIN — LACTULOSE 20 G: 20 SOLUTION ORAL at 23:18

## 2021-12-06 RX ADMIN — BISACODYL 5 MG: 5 TABLET, COATED ORAL at 08:17

## 2021-12-06 RX ADMIN — LACTULOSE 20 G: 20 SOLUTION ORAL at 08:17

## 2021-12-06 RX ADMIN — QUETIAPINE FUMARATE 100 MG: 100 TABLET ORAL at 23:18

## 2021-12-06 RX ADMIN — Medication 2000 UNITS: at 08:18

## 2021-12-06 RX ADMIN — ACETAMINOPHEN 650 MG: 325 TABLET ORAL at 04:57

## 2021-12-06 RX ADMIN — SENNOSIDES AND DOCUSATE SODIUM 1 TABLET: 50; 8.6 TABLET ORAL at 08:18

## 2021-12-06 RX ADMIN — QUETIAPINE FUMARATE 100 MG: 100 TABLET ORAL at 08:18

## 2021-12-06 RX ADMIN — INSULIN LISPRO 6 UNITS: 100 INJECTION, SOLUTION INTRAVENOUS; SUBCUTANEOUS at 08:19

## 2021-12-06 RX ADMIN — INSULIN LISPRO 3 UNITS: 100 INJECTION, SOLUTION INTRAVENOUS; SUBCUTANEOUS at 23:18

## 2021-12-06 RX ADMIN — ACETAMINOPHEN 650 MG: 325 TABLET ORAL at 18:33

## 2021-12-06 RX ADMIN — SENNOSIDES AND DOCUSATE SODIUM 1 TABLET: 50; 8.6 TABLET ORAL at 23:18

## 2021-12-06 RX ADMIN — POLYETHYLENE GLYCOL 3350 17 G: 17 POWDER, FOR SOLUTION ORAL at 08:17

## 2021-12-06 RX ADMIN — POTASSIUM CHLORIDE 20 MEQ: 20 TABLET, EXTENDED RELEASE ORAL at 08:18

## 2021-12-06 RX ADMIN — INSULIN LISPRO 9 UNITS: 100 INJECTION, SOLUTION INTRAVENOUS; SUBCUTANEOUS at 18:30

## 2021-12-06 RX ADMIN — TORSEMIDE 60 MG: 20 TABLET ORAL at 08:17

## 2021-12-06 ASSESSMENT — PAIN SCALES - PAIN ASSESSMENT IN ADVANCED DEMENTIA (PAINAD)
NEGVOCALIZATION: 1
BREATHING: 0
FACIALEXPRESSION: 1
CONSOLABILITY: 1
TOTALSCORE: 4
BODYLANGUAGE: 1

## 2021-12-06 ASSESSMENT — PAIN SCALES - GENERAL
PAINLEVEL_OUTOF10: 4
PAINLEVEL_OUTOF10: 3
PAINLEVEL_OUTOF10: 0

## 2021-12-06 NOTE — PROGRESS NOTES
Progress Note - Dr. Annie Rdz - Internal Medicine  PCP: Obdulia Memos [address] [facility] [address] / [address] [facility] [id number] [facility] Day: 3  Code Status: Full Code  Current Diet: ADULT DIET; Regular        CC: follow up on medical issues    Subjective:   Thong Echavarria is a 81 y.o. female. Pt seen and examined  Chart reviewed since last visit, labs and imaging below      Tolerated OR ok   Some hypotension noted on return to floor post op   Improved with fluids    Creat yest 2.2 (think this may be due to hypotension yest)  Awaiting labs today    Urine cx with enterococcus, sens pending    She denies chest pain, denies shortness of breath, denies nausea,  denies emesis. 10 system Review of Systems is reviewed with patient, and pertinent positives are noted in HPI above . Otherwise, Review of systems is negative. I have reviewed the patient's medical and social history in detail and updated the computerized patient record. To recap: She  has a past medical history of Anemia, Colon cancer ([address] Diabetes mellitus ([address] Fall, Gout, Hypercholesterolemia, Hypertension, Liver disease, Memory loss, Pulmonary embolism ([address] Rosacea, and Vitamin D deficiency. . She  has a past surgical history that includes Cataract removal (Bilateral, 2010); Cholecystectomy; Dilation and curettage of uterus; Liver Resection (Right); Hip Arthroplasty (Left, 2012); total nephrectomy (Left); and colectomy. . She  reports that she quit smoking about 41 years ago. Her smoking use included cigarettes. She quit after 15.00 years of use. She has never used smokeless tobacco. She reports current alcohol use. She reports that she does not use drugs. .        Active Hospital Problems    Diagnosis Date Noted    DEANNA (acute kidney injury) ([address] [N17.9] 12/03/2021    Closed left subtrochanteric femur fracture ([facility] [S72.22XA] 12/03/2021    Closed subcapital fracture of femur with delayed healing, left [S72.012G] 12/03/2021    Essential hypertension [I10] 05/30/2019    Controlled type 2 diabetes mellitus, without long-term current use of insulin ([facility] [E11.9] 05/30/2019       Current Facility-Administered Medications: enoxaparin (LOVENOX) injection 30 mg, 30 mg, SubCUTAneous, Daily  insulin lispro (1 Unit Dial) 0-18 Units, 0-18 Units, SubCUTAneous, TID WC  insulin lispro (1 Unit Dial) 0-9 Units, 0-9 Units, SubCUTAneous, Nightly  oxyCODONE (ROXICODONE) immediate release tablet 5 mg, 5 mg, Oral, Q4H PRN  HYDROmorphone HCl PF (DILAUDID) injection 0.5 mg, 0.5 mg, IntraVENous, Q3H PRN  ondansetron (ZOFRAN) injection 4 mg, 4 mg, IntraVENous, Q6H PRN  polyethylene glycol (GLYCOLAX) packet 17 g, 17 g, Oral, Daily  bisacodyl (DULCOLAX) EC tablet 5 mg, 5 mg, Oral, Daily  sennosides-docusate sodium (SENOKOT-S) 8.6-50 MG tablet 1 tablet, 1 tablet, Oral, BID  acetaminophen (TYLENOL) tablet 650 mg, 650 mg, Oral, Q4H PRN  0.9 % sodium chloride infusion, , IntraVENous, PRN  lactulose (CHRONULAC) 10 GM/15ML solution 20 g, 20 g, Oral, BID  LORazepam (ATIVAN) tablet 1 mg, 1 mg, Oral, BID PRN  potassium chloride (KLOR-CON M) extended release tablet 20 mEq, 20 mEq, Oral, Daily with breakfast  QUEtiapine (SEROQUEL) tablet 100 mg, 100 mg, Oral, BID  torsemide (DEMADEX) tablet 60 mg, 60 mg, Oral, Daily  morphine (PF) injection 2 mg, 2 mg, IntraVENous, Q3H PRN  Vitamin D (CHOLECALCIFEROL) tablet 2,000 Units, 2,000 Units, Oral, Daily  0.9 % sodium chloride infusion, , IntraVENous, Continuous  sodium chloride flush 0.9 % injection 5-40 mL, 5-40 mL, IntraVENous, 2 times per day  sodium chloride flush 0.9 % injection 10 mL, 10 mL, IntraVENous, PRN  0.9 % sodium chloride infusion, 25 mL, IntraVENous, PRN  morphine (PF) injection 2 mg, 2 mg, IntraVENous, Q2H PRN **OR** morphine injection 4 mg, 4 mg, IntraVENous, Q2H PRN  ondansetron (ZOFRAN-ODT) disintegrating tablet 4 mg, 4 mg, Oral, Q8H PRN **OR** [DISCONTINUED] ondansetron (ZOFRAN) injection 4 mg, 4 mg, IntraVENous, Q6H PRN  magnesium hydroxide (MILK OF MAGNESIA) 400 MG/5ML suspension 30 mL, 30 mL, Oral, Daily PRN  hydrALAZINE (APRESOLINE) injection 10 mg, 10 mg, IntraVENous, Q6H PRN  0.9 % sodium chloride bolus, 500 mL, IntraVENous, PRN  morphine (PF) injection 1 mg, 1 mg, IntraVENous, Q4H PRN  glucose (GLUTOSE) 40 % oral gel 15 g, 15 g, Oral, PRN  dextrose 50 % IV solution, 12.5 g, IntraVENous, PRN  glucagon (rDNA) injection 1 mg, 1 mg, IntraMUSCular, PRN  dextrose 5 % solution, 100 mL/hr, IntraVENous, PRN         Objective:  /65   Pulse 83   Temp 97.6 °F (36.4 °C) (Oral)   Resp 16   Ht 5' 11\" (1.803 m)   Wt 215 lb (97.5 kg)   LMP  (LMP Unknown) Comment: post-menopausal  SpO2 95%   BMI 29.99 kg/m²      Patient Vitals for the past 24 hrs:   BP Temp Temp src Pulse Resp SpO2   12/06/21 0730 116/65 97.6 °F (36.4 °C) Oral 83 -- 95 %   12/06/21 0456 -- -- -- -- -- 96 %   12/06/21 0445 (!) 107/57 99.7 °F (37.6 °C) Oral 97 16 (!) 83 %   12/05/21 2350 (!) 102/52 97.7 °F (36.5 °C) Axillary 94 16 95 %   12/05/21 2030 (!) 128/56 98.4 °F (36.9 °C) Oral 92 16 96 %   12/05/21 1728 -- -- -- -- -- 96 %   12/05/21 1715 -- -- -- -- -- (!) 85 %   12/05/21 1630 (!) 110/43 98 °F (36.7 °C) Oral 103 16 --   12/05/21 1115 110/62 97.7 °F (36.5 °C) Oral 102 16 93 %     Patient Vitals for the past 96 hrs (Last 3 readings):   Weight   12/05/21 0600 215 lb (97.5 kg)   12/03/21 2051 206 lb 14.4 oz (93.8 kg)   12/03/21 1607 135 lb (61.2 kg)           Intake/Output Summary (Last 24 hours) at 12/6/2021 0816  Last data filed at 12/6/2021 0455  Gross per 24 hour   Intake 2677 ml   Output 575 ml   Net 2102 ml         Physical Exam:   Vitals as above  General appearance: alert, appears stated age and cooperative    Head: Normocephalic, without obvious abnormality, atraumatic  Lungs: clear to auscultation bilaterally    Heart: regular rate and rhythm, S1, S2 normal, no murmur    Abdomen: soft, non-tender; bowel sounds normal; no masses, no organomegaly    Extremities: extremities normal, atraumatic, no cyanosis, no edema ; dressing CDI    Labs:  Lab Results   Component Value Date    WBC 13.2 (H) 12/05/2021    HGB 8.1 (L) 12/05/2021    HCT 24.4 (L) 12/05/2021     12/05/2021    ALT 11 12/04/2021    AST 19 12/04/2021     12/05/2021    K 4.6 12/05/2021     12/05/2021    CREATININE 2.2 (H) 12/05/2021    BUN 47 (H) 12/05/2021    CO2 22 12/05/2021    INR 1.15 (H) 12/03/2021    LABA1C 7.0 12/03/2021    LABMICR YES 12/03/2021     Lab Results   Component Value Date    TROPONINI <0.01 12/03/2021       Recent Imaging Results are Reviewed:  XR HIP RIGHT (2-3 VIEWS)    Result Date: 12/4/2021  Radiology exam is complete. No Radiologist dictation. Please follow up with ordering provider. XR FEMUR RIGHT (MIN 2 VIEWS)    Addendum Date: 12/3/2021    ADDENDUM: Correction. Acute fractures of the proximal RIGHT femur. Result Date: 12/3/2021  EXAMINATION: ONE XRAY VIEW OF THE PELVIS AND TWO XRAY VIEWS RIGHT HIP;   XRAY VIEWS OF THE RIGHT FEMUR 12/3/2021 5:02 pm COMPARISON: None. HISTORY: ORDERING SYSTEM PROVIDED HISTORY: Fall with injury, shortening and external rotation TECHNOLOGIST PROVIDED HISTORY: Reason for exam:->Fall with injury, shortening and external rotation Reason for Exam: Fall (Pt brought in by ems from [facility] for fall. Pt is c/o right leg pain. Pt has bruising to right forehead from a previous fall.) Acuity: Acute Type of Exam: Initial FINDINGS: Alignment of the pelvis is normal.  There is a left hip total prosthesis in normal alignment. No hardware complication is identified. There is severe osteoarthritis of the right hip, with pronounced joint space narrowing, subchondral sclerosis and osseous hypertrophy, including over growth of the superolateral acetabulum. There is an acute fracture of the lesser trochanter which is displaced medially by 8 mm.   There is a spiral fracture of the proximal 3rd of the femoral shaft with foreshortening. The distal segment positioned laterally. There is adjacent hematoma. No fracture of the more distal femur is identified. There is severe osteoarthritis of the knee, including joint space narrowing of the medial compartment. No knee joint effusion is seen. Traumatic acute fractures of the left femur. Displaced fracture of the lesser trochanter. Spiral fracture of the proximal femoral shaft with foreshortening. Severe osteoarthritis of the right hip and knee. Left hip prosthesis, in normal position. CT Head WO Contrast    Result Date: 12/3/2021  EXAMINATION: CT OF THE HEAD WITHOUT CONTRAST  12/3/2021 6:05 pm TECHNIQUE: CT of the head was performed without the administration of intravenous contrast. Dose modulation, iterative reconstruction, and/or weight based adjustment of the mA/kV was utilized to reduce the radiation dose to as low as reasonably achievable. COMPARISON: 05/28/2019 HISTORY: ORDERING SYSTEM PROVIDED HISTORY: History of fall TECHNOLOGIST PROVIDED HISTORY: Reason for exam:->History of fall Has a \"code stroke\" or \"stroke alert\" been called? ->No Decision Support Exception - unselect if not a suspected or confirmed emergency medical condition->Emergency Medical Condition (MA) Reason for Exam: Fall (Pt brought in by ems from [facility] for fall. Pt is c/o right leg pain. Pt has bruising to right forehead from a previous fall.) Acuity: Acute Type of Exam: Initial Mechanism of Injury: fall FINDINGS: BRAIN/VENTRICLES: The ventricles and sulci are diffusely enlarged. Low attenuation is seen in the periventricular and subcortical white matter. No acute intracranial hemorrhage or acute infarct is identified. ORBITS: The visualized portion of the orbits demonstrate no acute abnormality. SINUSES: The visualized paranasal sinuses and mastoid air cells demonstrate no acute abnormality. SOFT TISSUES/SKULL:  No acute abnormality of the visualized skull or soft tissues.      No acute intracranial abnormality. Diffuse atrophic changes with findings suggesting chronic microvascular ischemia     CT Cervical Spine WO Contrast    Result Date: 12/3/2021  EXAMINATION: CT OF THE CERVICAL SPINE WITHOUT CONTRAST 12/3/2021 6:05 pm TECHNIQUE: CT of the cervical spine was performed without the administration of intravenous contrast. Multiplanar reformatted images are provided for review. Dose modulation, iterative reconstruction, and/or weight based adjustment of the mA/kV was utilized to reduce the radiation dose to as low as reasonably achievable. COMPARISON: None. HISTORY: ORDERING SYSTEM PROVIDED HISTORY: History of fall TECHNOLOGIST PROVIDED HISTORY: Reason for exam:->History of fall Decision Support Exception - unselect if not a suspected or confirmed emergency medical condition->Emergency Medical Condition (MA) Reason for Exam: Fall (Pt brought in by ems from [facility] for fall. Pt is c/o right leg pain. Pt has bruising to right forehead from a previous fall.) Acuity: Acute Type of Exam: Initial Mechanism of Injury: fall FINDINGS: BONES/ALIGNMENT: There is no acute fracture or traumatic malalignment. DEGENERATIVE CHANGES: Multilevel degenerative changes. SOFT TISSUES: There is no prevertebral soft tissue swelling. No acute abnormality of the cervical spine. XR CHEST PORTABLE    Result Date: 12/3/2021  EXAMINATION: ONE XRAY VIEW OF THE CHEST 12/3/2021 5:56 pm COMPARISON: None. HISTORY: ORDERING SYSTEM PROVIDED HISTORY: Right hip fracture, preop TECHNOLOGIST PROVIDED HISTORY: Reason for exam:->Right hip fracture, preop FINDINGS: The lungs are without acute focal process. There is no effusion or pneumothorax. The cardiomediastinal silhouette is without acute process. The osseous structures are without acute process. No acute process. FLUORO FOR SURGICAL PROCEDURES    Result Date: 12/4/2021  Radiology exam is complete. No Radiologist dictation. Please follow up with ordering provider.      XR HIP 2-3 VW W PELVIS RIGHT    Addendum Date: 12/3/2021    ADDENDUM: Correction. Acute fractures of the proximal RIGHT femur. Result Date: 12/3/2021  EXAMINATION: ONE XRAY VIEW OF THE PELVIS AND TWO XRAY VIEWS RIGHT HIP;   XRAY VIEWS OF THE RIGHT FEMUR 12/3/2021 5:02 pm COMPARISON: None. HISTORY: ORDERING SYSTEM PROVIDED HISTORY: Fall with injury, shortening and external rotation TECHNOLOGIST PROVIDED HISTORY: Reason for exam:->Fall with injury, shortening and external rotation Reason for Exam: Fall (Pt brought in by ems from [facility] for fall. Pt is c/o right leg pain. Pt has bruising to right forehead from a previous fall.) Acuity: Acute Type of Exam: Initial FINDINGS: Alignment of the pelvis is normal.  There is a left hip total prosthesis in normal alignment. No hardware complication is identified. There is severe osteoarthritis of the right hip, with pronounced joint space narrowing, subchondral sclerosis and osseous hypertrophy, including over growth of the superolateral acetabulum. There is an acute fracture of the lesser trochanter which is displaced medially by 8 mm. There is a spiral fracture of the proximal 3rd of the femoral shaft with foreshortening. The distal segment positioned laterally. There is adjacent hematoma. No fracture of the more distal femur is identified. There is severe osteoarthritis of the knee, including joint space narrowing of the medial compartment. No knee joint effusion is seen. Traumatic acute fractures of the left femur. Displaced fracture of the lesser trochanter. Spiral fracture of the proximal femoral shaft with foreshortening. Severe osteoarthritis of the right hip and knee. Left hip prosthesis, in normal position.        Lab Results   Component Value Date    GLUCOSE 275 12/05/2021     Lab Results   Component Value Date    POCGLU 208 12/06/2021     /65   Pulse 83   Temp 97.6 °F (36.4 °C) (Oral)   Resp 16   Ht 5' 11\" (1.803 m)   Wt 215 lb (97.5 kg)   LMP  (LMP Unknown) Comment: post-menopausal  SpO2 95%   BMI 29.99 kg/m²     Assessment and Plan:  Principal Problem:    Closed left subtrochanteric femur fracture ([facility] -Established problem. Stable. Doing well post op  Plan: Continue present orders/plan. Active Problems:    Controlled type 2 diabetes mellitus, without long-term current use of insulin ([address] -Established problem. Stable. FSBS 208  Plan: cont ccc diet, sliding scale, home meds    Essential hypertension -Established problem. Stable. 116/65  Plan: stay on same meds    DEANNA (acute kidney injury) ([facility] -Established problem. Uncontrolled. Creat up today. Thought to be due to hypotension post op yest  Plan: cont fluids, repeat labs pending this am    Anemia, acute blood loss postoperatively - hgb 8.1 yest, labs pending this am  Plan: No indication for transfusion. Cont to monitor h/h to assess progression of anemia. Recommend ferrous sulfate or MVI as outpatient. Disp - to SNF tomorrow?  Await urine cx, await resolution of deanna    (Please note that portions of this note were completed with a voice recognition program.  Efforts were made to edit the dictations but occasionally words are mis-transcribed.)        Raul Rees MD  12/6/2021

## 2021-12-06 NOTE — PROGRESS NOTES
[facility] Orthopedic Surgery   Progress Note    CHIEF COMPLAINT/DIAGNOSIS: S/p RIGHT hip IM nailing     SUBJECTIVE: The patient is sitting up in bed. Reports mild hip pain at rest.  Was complaining of right shoulder pain with certain movements to nursing; denies any shoulder pain to me today. Tells me its been bothering her a bit over the last few days. RHD. No pain with full Passive ROM on exam today. OBJECTIVE  Physical    VITALS:  BP (!) 111/49 Comment: RN notified   Pulse 87   Temp 98.2 °F (36.8 °C) (Axillary)   Resp 20   Ht 5' 11\" (1.803 m)   Wt 215 lb (97.5 kg)   LMP  (LMP Unknown) Comment: post-menopausal  SpO2 95%   BMI 29.99 kg/m²     GENERAL: Alert and oriented x3, in no acute distress. MUSCULOSKELETAL: Able to dorsi and plantarflex the ankle on operative side without issue. NO TTP about right shoulder. Full passive ROM without pain. She has 4/5 strength with right shoulder abduction/ER. No swelling, ecchymosis, or deformity noted. INCISION:  Covered with post-op dressing; moderate bloody drainage. RN to change dressing. ROM: Limited secondary pain and swelling. Sensory:  Intact to light touch in peroneal and tibial distributions. Vascular:   2+ DP pulses with brisk cap refill;  calf soft and nontender. Data    ALL MEDICATIONS HAVE BEEN REVIEWED    CBC: Recent Labs     12/04/21  0609 12/04/21  0609 12/04/21  1408 12/05/21  0522 12/06/21  0804   WBC 16.6*  --   --  13.2* 10.2   HGB 10.2*   < > 6.9* 8.1* 6.8*   HCT 32.0*   < > 21.6* 24.4* 20.1*     --   --  163 136    < > = values in this interval not displayed.      BMP:   Recent Labs     12/04/21  0608 12/05/21  0522 12/06/21  0804    138 139   K 5.2* 4.6 4.5    103 107   CO2 23 22 23   BUN 42* 47* 46*   CREATININE 1.5* 2.2* 1.4*     INR:   Recent Labs     12/03/21  1750   INR 1.15*       ASSESSMENT:  S/p RIGHT hip IM nailing (12/4/21), POD#2  Acute post-op blood loss anemia  DM II  DEANNA  HTN    PLAN:   - WB status:  50% weight bearing through operative extremity   - DVT prophylaxis: Lovenox as inpt. Has Xarelto/Eliquis listed on med list.  Given her frequent falls reported, seems like she may not be the best candidate for this. If discontinued, then rec Lovenox 40mg x 28 days post-op  - PT/OT  - Pain Control: tylenol prn. Due to orthopaedic surgical procedure/condition, patient may require pain medication for up to 6-8 weeks. - Acute post op acute blood loss anemia: H/H today: 6.8/20.1 (1 unit PRBC ordered for today) received another unit on 12/4. Renal: GFR/Cr improving.  - ID:  Ancef x 2 doses post-op completed. - Disposition: SNF when medically ready. Expect 1-2 days to stabilize H/H. Follow-up in four weeks with Dr. Lake Lambert.   Office # [phone]    KEREN Beebe - CNP  12/6/2021  10:25 AM

## 2021-12-06 NOTE — PROGRESS NOTES
[address]   Admission H&P    Date of admission: 12/9/2020    Patient name: Arlette Anderson  MRN: [id number]  YOB: 1942  Age: 69 y.o. Primary care provider:  Cheyanne Balderas MD     Source of Information: patient, medical records    Chief complaint:  Worsening weakness, cough, dec po intake    History of Present Illness  Arlette Anderson is a 69 y.o. female with a PMH of significant for covid positivity, bladder cancer, HTN,pancytopenia, rheumatoid arthritis, hypothyroidism, GERD, who presents to the ED complaining of worsening Covid symptoms. She tested positive for Covid at a urgent care on 12/5. Specifically, she is complaining of constant cough, nausea, diarrhea, muscle aches, no appetite. States her breathing has been OK. Patient has had troubles eating or drinking the last few days. Patient denies chest pain, hematuria, blood in stool, abdominal pain. Of note, pt found to have a urinary tract infection. She does follow with Dr. Ted Connolly for bladder cancer and is s/p urostomy for 20+ years. Not on prophylactic antibiotics but does have frequent UTIs, believes the most recent one was 2 years ago. Denies dysuria but states urine has been  Darker the past few days. COVID Questions:   Experiencing any of the following symptoms: fever, chills, cough, SOB, diarrhea, URI symptoms. Yes   Any Sick contacts fever, cough, diarrhea, SOB, URI symptoms. Yes  Traveled out of state or out of country. No    In the ED:   Vitals: 124/58, 108 bpm, 103, 96% RA, 17  Labs: Significant for WBC 3.0 (L), Hgb 11 (L), PLT 83 (L), Na 135 (L), Glucose 129 (H), Cr 1.73 (H), AST 54 (H), LA 1.48 (nml), d-dimer 0.86 (H), PT 11.4 (H),  (H), CK (208). BNP nml, trop neg. UA shows + nitrites, small LE, mod blood, 2+ bacteria. Imaging: CXR nml. CT chest multiple small patchy groundglass infiltrates bilaterally.   Treatment: Cefepime 2g IV, vancomycin 1500mg IV, acetaminophen Right hip dressing saturated, dressing changed, new dressing placed  Dallas Pradhan RN 1000mg, zofran 8mg IV, saline bolus    Review of Systems  Review of Systems   Constitutional: Positive for chills and fever. Respiratory: Positive for cough and sputum production. Negative for hemoptysis and shortness of breath. Cardiovascular: Negative for chest pain and leg swelling. Gastrointestinal: Positive for diarrhea and nausea. Negative for abdominal pain, blood in stool, constipation and vomiting. Genitourinary: Negative for dysuria and hematuria. Neurological: Positive for weakness. Home Medications   Prior to Admission medications    Medication Sig Start Date End Date Taking? Authorizing Provider   losartan (COZAAR) 50 mg tablet Take 1 Tab by mouth daily. Indications: high blood pressure 14/06/76   Niki Michel MD   hydroCHLOROthiazide (HYDRODIURIL) 25 mg tablet Take 1 Tab by mouth daily. 87/58/76   Niki Michel MD   nystatin (MYCOSTATIN) powder Apply  to affected area four (4) times daily. 47/36/98   Niki Michel MD   varicella-zoster recombinant, PF, (Shingrix, PF,) 50 mcg/0.5 mL susr injection 0.5mL by IntraMUSCular route once now and then repeat in 2-6 months  Indications: shingles vaccination 64/01/58   Niki Michel MD   levothyroxine (SYNTHROID) 75 mcg tablet TAKE 1 TAB BY MOUTH DAILY (BEFORE BREAKFAST). 75/78/50   Niki Michel MD   omeprazole (PRILOSEC) 20 mg capsule TAKE 1 CAPSULE BY MOUTH EVERY DAY 8/19/20   Tanvir Schneider MD   ergocalciferol (ERGOCALCIFEROL) 1,250 mcg (50,000 unit) capsule Take 1 Cap by mouth every seven (7) days. Indications: low vitamin D levels 5/17/50   Niki Michel MD   ENBREL MINI 50 mg/mL (1 mL) crtg every seven (7) days.  12/13/19   Provider, Historical   Safety Needles 25 x 5/8 \" ndle Use 1 syringe every month for your Vitamin B injections 1/17/20   Christiano Awad MD   Syringe, Disposable, 3 mL syrg Use 1 syringe every month for your Vitamin B injections 1/17/20   Zhane Palafox MD   cyanocobalamin (VITAMIN B12) 1,000 mcg/mL injection 1 mL by SubCUTAneous route every thirty (30) days. 20   Alma Jean-Baptiste MD       Allergies   Allergies   Allergen Reactions    Bactrim [Sulfamethoprim] Nausea and Vomiting    Ciprofloxacin Other (comments)     Joint aches    Diclofenac Nausea Only    Sulfa (Sulfonamide Antibiotics) Nausea Only       Past Medical History:   Diagnosis Date    Arthritis     Cancer ([facility] [address]     bladder    Endocrine disease     hyperthyroid    Gastrointestinal disorder     GERD (gastroesophageal reflux disease)     Hypertension     Other ill-defined conditions(799.89)     hyperthyroid       Past Surgical History:   Procedure Laterality Date    HX GYN      HX HERNIA REPAIR      HX HIP REPLACEMENT Left     HX UROLOGICAL      stoma since 81       Family History   Problem Relation Age of Onset    Heart Disease Father     Cancer Father         Lung       Social History   Patient resides    Independently    x  With family care      Assisted living      SNF      Ambulates    Independently    X   With cane  infrequently     Assisted walker      Alcohol history   x  None     Social     Chronic     Smoking history  x  None     Former smoker     Current smoker     Social History     Tobacco Use   Smoking Status Former Smoker    Packs/day: 1.00    Last attempt to quit: 7/3/1999    Years since quittin.4   Smokeless Tobacco Never Used       Drug history  x  None     Former drug user     Current drug user     Code status  x  Full code     DNR/DNI     Partial    Code status discussed with the patient/caregivers. Physical Exam  Visit Vitals  BP (!) 98/46   Pulse 90   Temp (!) 103 °F (39.4 °C)   Resp 25   Wt 194 lb (88 kg)   SpO2 93%   BMI 32.28 kg/m²        Physical Exam  Vitals signs and nursing note reviewed. Constitutional:       Appearance: Normal appearance. She is not ill-appearing.    HENT:      Nose: Nose normal.   Eyes:      Conjunctiva/sclera: Conjunctivae normal.   Cardiovascular:      Rate and Rhythm: Normal rate and regular rhythm. Pulses: Normal pulses. Heart sounds: Normal heart sounds. Pulmonary:      Effort: Pulmonary effort is normal. No respiratory distress. Breath sounds: Normal breath sounds. Abdominal:      General: Abdomen is flat. Bowel sounds are normal.      Palpations: Abdomen is soft. Tenderness: There is no abdominal tenderness. Comments: urostomy bag with normal urine    Musculoskeletal: Normal range of motion. Skin:     Capillary Refill: Capillary refill takes less than 2 seconds. Neurological:      Mental Status: She is alert. Laboratory Data  Recent Results (from the past 24 hour(s))   CBC WITH AUTOMATED DIFF    Collection Time: 12/09/20 12:36 AM   Result Value Ref Range    WBC 3.0 (L) 3.6 - 11.0 K/uL    RBC 3.59 (L) 3.80 - 5.20 M/uL    HGB 11.0 (L) 11.5 - 16.0 g/dL    HCT 32.5 (L) 35.0 - 47.0 %    MCV 90.5 80.0 - 99.0 FL    MCH 30.6 26.0 - 34.0 PG    MCHC 33.8 30.0 - 36.5 g/dL    RDW 12.8 11.5 - 14.5 %    PLATELET 83 (L) 250 - 400 K/uL    MPV 12.6 8.9 - 12.9 FL    NRBC 0.0 0  WBC    ABSOLUTE NRBC 0.00 0.00 - 0.01 K/uL    NEUTROPHILS 77 (H) 32 - 75 %    LYMPHOCYTES 14 12 - 49 %    MONOCYTES 9 5 - 13 %    EOSINOPHILS 0 0 - 7 %    BASOPHILS 0 0 - 1 %    IMMATURE GRANULOCYTES 0 0.0 - 0.5 %    ABS. NEUTROPHILS 2.3 1.8 - 8.0 K/UL    ABS. LYMPHOCYTES 0.4 (L) 0.8 - 3.5 K/UL    ABS. MONOCYTES 0.3 0.0 - 1.0 K/UL    ABS. EOSINOPHILS 0.0 0.0 - 0.4 K/UL    ABS. BASOPHILS 0.0 0.0 - 0.1 K/UL    ABS. IMM.  GRANS. 0.0 0.00 - 0.04 K/UL    DF SMEAR SCANNED      RBC COMMENTS NORMOCYTIC, NORMOCHROMIC     CK W/ REFLX CKMB    Collection Time: 12/09/20 12:36 AM   Result Value Ref Range     (H) 26 - 269 U/L   METABOLIC PANEL, COMPREHENSIVE    Collection Time: 12/09/20 12:36 AM   Result Value Ref Range    Sodium 135 (L) 136 - 145 mmol/L    Potassium 4.2 3.5 - 5.1 mmol/L    Chloride 104 97 - 108 mmol/L    CO2 24 21 - 32 mmol/L    Anion gap 7 5 - 15 mmol/L Glucose 129 (H) 65 - 100 mg/dL    BUN 40 (H) 6 - 20 MG/DL    Creatinine 1.73 (H) 0.55 - 1.02 MG/DL    BUN/Creatinine ratio 23 (H) 12 - 20      GFR est AA 35 (L) >60 ml/min/1.73m2    GFR est non-AA 29 (L) >60 ml/min/1.73m2    Calcium 8.6 8.5 - 10.1 MG/DL    Bilirubin, total 0.7 0.2 - 1.0 MG/DL    ALT (SGPT) 38 12 - 78 U/L    AST (SGOT) 54 (H) 15 - 37 U/L    Alk. phosphatase 45 45 - 117 U/L    Protein, total 8.1 6.4 - 8.2 g/dL    Albumin 3.6 3.5 - 5.0 g/dL    Globulin 4.5 (H) 2.0 - 4.0 g/dL    A-G Ratio 0.8 (L) 1.1 - 2.2     TROPONIN I    Collection Time: 12/09/20 12:36 AM   Result Value Ref Range    Troponin-I, Qt. <0.05 <0.05 ng/mL   NT-PRO BNP    Collection Time: 12/09/20 12:36 AM   Result Value Ref Range    NT pro- <450 PG/ML   PROCALCITONIN    Collection Time: 12/09/20 12:36 AM   Result Value Ref Range    Procalcitonin <0.05 ng/mL   LD    Collection Time: 12/09/20 12:36 AM   Result Value Ref Range     (H) 81 - 246 U/L   CK-MB,QUANT.     Collection Time: 12/09/20 12:36 AM   Result Value Ref Range    CK - MB <1.0 <3.6 NG/ML    CK-MB Index Cannot be calculated 0.0 - 2.5     PROTHROMBIN TIME + INR    Collection Time: 12/09/20 12:37 AM   Result Value Ref Range    INR 1.1 0.9 - 1.1      Prothrombin time 11.4 (H) 9.0 - 11.1 sec   PTT    Collection Time: 12/09/20 12:37 AM   Result Value Ref Range    aPTT 27.1 22.1 - 31.0 sec    aPTT, therapeutic range     58.0 - 77.0 SECS   D DIMER    Collection Time: 12/09/20 12:37 AM   Result Value Ref Range    D-dimer 0.86 (H) 0.00 - 0.65 mg/L FEU   POC LACTIC ACID    Collection Time: 12/09/20 12:37 AM   Result Value Ref Range    Lactic Acid (POC) 1.48 0.40 - 2.00 mmol/L   URINALYSIS W/ RFLX MICROSCOPIC    Collection Time: 12/09/20 12:41 AM   Result Value Ref Range    Color YELLOW/STRAW      Appearance CLOUDY (A) CLEAR      Specific gravity 1.013 1.003 - 1.030      pH (UA) 6.0 5.0 - 8.0      Protein 100 (A) NEG mg/dL    Glucose Negative NEG mg/dL    Ketone Negative NEG mg/dL    Bilirubin Negative NEG      Blood MODERATE (A) NEG      Urobilinogen 0.2 0.2 - 1.0 EU/dL    Nitrites Positive (A) NEG      Leukocyte Esterase SMALL (A) NEG      WBC 20-50 0 - 4 /hpf    RBC 5-10 0 - 5 /hpf    Epithelial cells FEW FEW /lpf    Bacteria 2+ (A) NEG /hpf   SARS-COV-2    Collection Time: 12/09/20 12:42 AM   Result Value Ref Range    Specimen source Nasopharyngeal      Specimen source Nasopharyngeal      COVID-19 rapid test Detected (AA) NOTD      Specimen type NP Swab      Health status NP Swab     INFLUENZA A+B VIRAL AGS    Collection Time: 12/09/20 12:42 AM   Result Value Ref Range    Influenza A Antigen Negative NEG      Influenza B Antigen Negative NEG         Imaging  Xr Chest Port    Result Date: 12/9/2020  IMPRESSION: No acute changes. Assessment and Plan     Benson Aguero is a 69 y.o. female with a PMH of covid positivity, bladder cancer, HTN, hypokalemia, pancytopenia, rheumatoid arthritis, hypothyroidism, GERD who is admitted for Sepsis 2/2 Covid infection and UTI. Sepsis 2/2 Covid and UTI: WBC 3.0, temp 103, . UA +nitrites, LE, 2+ bacteria. Reportedly Covid + on Dec 5, as well as in the ED 12/9. LA and Procal nml. - Admit to tele   - Vitals per unit protocol  - Bcx ordered  - Ucx ordered  - Continue Vanc, Cefepime  - Tylenol 650mg q6h prn for fever  - Compazine 10mg IV q4h prn for N/V  - MIVF, regular diet  - strict I&Os  - Droplet precautions    Covid positive: On Dec 5 reportedly, confirmed 12/9. Symptomatic.  - Atorvastatin 20mg daily, Vitamin C 500mg BID, Zinc 220mg daily  - Daily d-dimer, CRP, LD, ferritin    Pancytopenia: POA WBC 3.0, Hgb 11.0, PLT 83. Has seen Dr. Shagufta Song, per chart review alert for PLT < 100.  - C/s oncology  - Daily CBC    At risk LEATHA: POA Cr 1.73 (BL 1.0-1.25).   - MIVF    - Monitor with daily BMP    HTN: Home HCTZ 25mg daily and losartan 50mg daily.  - Hold home meds d/t hypotension    Bladder Cancer: s/p urosotomy ~20 years.  Followed by  Yonatan Garrett     Hypothyroidism: TSH 0.97 in 10/2020. Home synthroid 75mcg daily  - Continue home meds    RA: Home Enbrel 50mg/ml every Thursday. Skipped last week due to Covid infection.  - Hold enbrel    Vitamin D deficiency: 1250mcg every Tuesday  - Continue home meds    B12 Deficiency: 1000 mcg/ml every month. Due for injection.  - F/u outpatient    Obesity: Body mass index is 32.28 kg/m². - Encourage lifestyle modification and further follow up with PCP outpatient. FEN/GI: Regular diet. NS at 125 mL/hr. Activity: Ambulate with assistance. DVT prophylaxis: SCDs  GI prophylaxis:  None  Disposition: Plan to d/c to TBD. PT/OT consulted. POC:     CODE STATUS:  Full Code       Patient discussed with Dr. Baltazar Neville (Family Medicine Attending)       Samy Garay 7 Problems  Date Reviewed: 10/23/2020    None        [facility] Attending Addendum:  I saw and evaluated the patient on the day of the encounter with Dr. Theodore Lopez, performing the key elements of the service. I discussed the findings, assessment and plan with the resident and agree with the resident's findings and plan as documented in the resident's note. Continue abx. Pending cultures. Appreciate Hem/Onc assistance and recommendations.      Mohini Rojas MD, FAAFP, CAQSM, RMSK

## 2021-12-06 NOTE — PROGRESS NOTES
Assessment complete. VSS. Lung sounds diminished, patient unable to follow direction of IS, will continue to try. Patient repositioned for breakfast, meds whole in applesauce, patient coughing with water. Speech eval ordered. Patient oriented to self only. Call light within reach. Student in room feeding patient. Neuro checks WNL, dressing to right hip is CD&I.  Will continue to monitor  Yanet Carbone RN

## 2021-12-06 NOTE — PROGRESS NOTES
Occupational Therapy    Mellissa James    Pt currently on hold this date. RN reporting pt recently combative with staff and pt receiving 1 unit of blood at this time. Will follow up with patient when appropriate for therapy treatment.      [place], [place]

## 2021-12-06 NOTE — PROGRESS NOTES
Speech Language Pathology  Facility/Department: [address] ORTHO/NEURO NURSING  Initial Assessment  DYSPHAGIA BEDSIDE SWALLOW EVALUATION     Patient: Duncan Gowers   : 1935   MRN: [id number]      Evaluation Date: 2021   Admitting Diagnosis: Frequent falls [R29.6]  Closed displaced [facility] of lesser trochanter of right femur[facility]  Fall on same level from slipping, tripping or stumbling, initial encounter [W01. 0XXA]  Closed subcapital fracture of femur with delayed healing, left [S72.012G]  Pain: Pt denies pain at this time                         H&P: Duncan Gowers is a 81 y.o. female who presents by EMS from local [facility]. Patient states walking and fell causing injury to the right hip with external rotation and shortening. She has some ecchymotic change on the forehead from a previous fall. Patient states was walking on the sidewalk when she apparently fell as she at times does. However she is a nursing home patient. She is here [facility]. I am not so sure she was outside walking on a sidewalk. She has no other related complaints regarding the fall. Only complaint is pain right hip area. No prior surgery on hip or knees bilateral.  I do not find evidence of ED visit regarding the head injury that occurred days ago. The ecchymotic changes appear at least 1to 11days old. Chest xray:  Impression   Patchy linear opacities within the lungs which are nonspecific and may   reflect atelectasis.  An atypical or viral pneumonia is not excluded.         Head CT:   Impression   No acute intracranial abnormality.       Diffuse atrophic changes with findings suggesting chronic microvascular   ischemia       History/Prior Level of Function:   Living Status: SNF    Prior Dysphagia History: No prior dysphagia history per chart review. Currently the Pt's nurse reports coughing with thin liquids with meds this am which prompted SLP consult.     Dysphagia Impressions/Diagnosis: Oropharyngeal Dysphagia   Pt lethargic and confused but able to be awakened for po acceptance. Pt intermittently verbally combative but was able to be redirected. Pt unable to complete OME at this time. With po trials, reduced bolus control and A-P propulsion noted with all textures. Rapid and premature bolus loss to pharynx noted with thin liquids via tsp and cup. Prolonged mastication but effective oral clearing noted with solids. Clinical symptoms of mild delayed swallow initiation reduced laryngeal excursion and intermittent delayed pharyngeal clearing noted with all textures. Clinical symptoms of penetration/aspiration with delayed cough noted with thin liquids. Improved bolus control,  Improved timely swallow initiation and improved airway protection during the swallow with no overt signs of aspiration noted with textures > thins. However, precautions should be followed to minimize aspiration potential with all textures due to confused state and poor awareness of swallowing safety at this time. Recommended Diet and Intervention 12/6/2021:  Diet Solids Recommendation:  Dysphagia III Soft and Bite-Sized  Liquid Consistency Recommendation:  Mildly thick (Nectar ) liquids Recommended form of Meds:   Meds with applesauce     Compensatory Swallowing Strategies:  Upright as possible with all PO intake , No straws , Small bites/sips , Eat/feed slowly, Remain upright 30-45 min , Total Feed     SHORT TERM DYSPHAGIA GOALS/PLAN OF CARE: Speech therapy for dysphagia tx 3-5 times per week during acute care stay.     Pt will functionally tolerate recommended diet with no overt clinical s/s of aspiration    Dysphagia Therapeutic Intervention:  Diet Tolerance Monitoring , Patient/Family Education     Discharge Recommendations: Recommend ongoing SLP for dysphagia therapy upon discharge from hospital     Patient Positioning: Upright in bed    Current Diet Level (prior to evaluation): Regular texture diet with  Thin liquids      Respiratory Status:   []Room Air   [x]O2 via nasal cannula   []Other:    Dentition:  [x]Adequate  []Dentures   []Missing Many Teeth  []Edentulous  []Other:    Baseline Vocal Quality:  []Normal  []Dysphonic   []Aphonic   [x]Hoarse  []Wet  []Weak  []Other:    Volitional Swallow:   []Absent   []Delayed     []Adequate     [x]Required use of drink     Oral Mechanism Exam:  []WFL []Mild   [] Moderate  []Severe  [x]To be assessed  Impaired:   []Left side      []Right side    []Labial ROM/Coordination    []Labial Symmetry   []Lingual ROM/Coordination   []Lingual Symmetry  []Gag  []Other:     Oral Phase: []WFL [x]Mild   [x] Moderate  []Severe  []To be assessed   [x]Impaired/Prolonged Mastication:   []Spillage Left:   []Spillage Right:  []Pocketing Left:   []Pocketing Right:   [x]Decreased Anterior to Posterior Transit:   [x]Suspected Premature Bolus Loss:   []Lingual/Palatal Residue:   []Other:     Pharyngeal Phase: []WFL []Mild   [] Moderate  []Severe  []To be assessed   [x]Delayed Swallow:   []Suspected Pharyngeal Pooling:   [x]Decreased Laryngeal Elevation:   []Absent Swallow:  []Wet Vocal Quality:   []Throat Clearing-Immediate:   []Throat Clearing-Delayed:   []Cough-Immediate:   [x]Cough-Delayed:  []Change in Vital Signs:  [x]Suspected Delayed Pharyngeal Clearing:  []Other:       Eating Assistance:  []Independent  []Setup or clean-up assistance   [] Supervision or touching assistance   [] Partial or moderate assistance   [] Substantial or maximal assistance  [x] Dependent       EDUCATION:   Provided education regarding role of SLP, results of assessment, recommendations and general speech pathology plan of care. [] Pt verbalized understanding and agreement   [x] Pt requires ongoing learning   [] No evidence of comprehension     If patient discharges prior to next visit, this note will serve as discharge.      Timed Code Minutes: 0 min   Total Treatment Minutes: 30 min    Maria M Webster [id number]-SLP#[id number]

## 2021-12-07 ENCOUNTER — APPOINTMENT (OUTPATIENT)
Dept: NUCLEAR MEDICINE | Age: 86
DRG: 480 | End: 2021-12-07
Payer: MEDICARE

## 2021-12-07 LAB
ANION GAP SERPL CALCULATED.3IONS-SCNC: 13 MMOL/L (ref 3–16)
ANISOCYTOSIS: ABNORMAL
BANDED NEUTROPHILS RELATIVE PERCENT: 1 % (ref 0–7)
BASOPHILS ABSOLUTE: 0 K/UL (ref 0–0.2)
BASOPHILS RELATIVE PERCENT: 0 %
BLOOD BANK DISPENSE STATUS: NORMAL
BLOOD BANK PRODUCT CODE: NORMAL
BPU ID: NORMAL
BUN BLDV-MCNC: 37 MG/DL (ref 7–20)
CALCIUM SERPL-MCNC: 7.8 MG/DL (ref 8.3–10.6)
CHLORIDE BLD-SCNC: 111 MMOL/L (ref 99–110)
CO2: 20 MMOL/L (ref 21–32)
CREAT SERPL-MCNC: 1 MG/DL (ref 0.6–1.2)
DESCRIPTION BLOOD BANK: NORMAL
EKG ATRIAL RATE: 84 BPM
EKG DIAGNOSIS: NORMAL
EKG P AXIS: 83 DEGREES
EKG P-R INTERVAL: 180 MS
EKG Q-T INTERVAL: 370 MS
EKG QRS DURATION: 78 MS
EKG QTC CALCULATION (BAZETT): 437 MS
EKG R AXIS: 7 DEGREES
EKG T AXIS: 88 DEGREES
EKG VENTRICULAR RATE: 84 BPM
EOSINOPHILS ABSOLUTE: 0.1 K/UL (ref 0–0.6)
EOSINOPHILS RELATIVE PERCENT: 1 %
GFR AFRICAN AMERICAN: >60
GFR NON-AFRICAN AMERICAN: 53
GLUCOSE BLD-MCNC: 160 MG/DL (ref 70–99)
GLUCOSE BLD-MCNC: 167 MG/DL (ref 70–99)
GLUCOSE BLD-MCNC: 172 MG/DL (ref 70–99)
GLUCOSE BLD-MCNC: 181 MG/DL (ref 70–99)
GLUCOSE BLD-MCNC: 216 MG/DL (ref 70–99)
HCT VFR BLD CALC: 21.2 % (ref 36–48)
HCT VFR BLD CALC: 23.9 % (ref 36–48)
HCT VFR BLD CALC: 25.7 % (ref 36–48)
HCT VFR BLD CALC: 27.2 % (ref 36–48)
HEMOGLOBIN: 7 G/DL (ref 12–16)
HEMOGLOBIN: 8 G/DL (ref 12–16)
HEMOGLOBIN: 8.7 G/DL (ref 12–16)
HEMOGLOBIN: 9.1 G/DL (ref 12–16)
LYMPHOCYTES ABSOLUTE: 0.6 K/UL (ref 1–5.1)
LYMPHOCYTES RELATIVE PERCENT: 7 %
MCH RBC QN AUTO: 30.7 PG (ref 26–34)
MCHC RBC AUTO-ENTMCNC: 33.4 G/DL (ref 31–36)
MCV RBC AUTO: 92.1 FL (ref 80–100)
MONOCYTES ABSOLUTE: 0.6 K/UL (ref 0–1.3)
MONOCYTES RELATIVE PERCENT: 7 %
NEUTROPHILS ABSOLUTE: 7.5 K/UL (ref 1.7–7.7)
NEUTROPHILS RELATIVE PERCENT: 84 %
NUCLEATED RED BLOOD CELLS: 2 /100 WBC
PDW BLD-RTO: 16.6 % (ref 12.4–15.4)
PERFORMED ON: ABNORMAL
PLATELET # BLD: 142 K/UL (ref 135–450)
PLATELET SLIDE REVIEW: ADEQUATE
PMV BLD AUTO: 8.1 FL (ref 5–10.5)
POLYCHROMASIA: ABNORMAL
POTASSIUM SERPL-SCNC: 3.9 MMOL/L (ref 3.5–5.1)
RBC # BLD: 2.6 M/UL (ref 4–5.2)
SLIDE REVIEW: ABNORMAL
SODIUM BLD-SCNC: 144 MMOL/L (ref 136–145)
WBC # BLD: 8.8 K/UL (ref 4–11)

## 2021-12-07 PROCEDURE — 85018 HEMOGLOBIN: CPT

## 2021-12-07 PROCEDURE — 85025 COMPLETE CBC W/AUTO DIFF WBC: CPT

## 2021-12-07 PROCEDURE — 6370000000 HC RX 637 (ALT 250 FOR IP): Performed by: INTERNAL MEDICINE

## 2021-12-07 PROCEDURE — 36415 COLL VENOUS BLD VENIPUNCTURE: CPT

## 2021-12-07 PROCEDURE — 94760 N-INVAS EAR/PLS OXIMETRY 1: CPT

## 2021-12-07 PROCEDURE — 2580000003 HC RX 258: Performed by: INTERNAL MEDICINE

## 2021-12-07 PROCEDURE — 80048 BASIC METABOLIC PNL TOTAL CA: CPT

## 2021-12-07 PROCEDURE — 97530 THERAPEUTIC ACTIVITIES: CPT

## 2021-12-07 PROCEDURE — 85014 HEMATOCRIT: CPT

## 2021-12-07 PROCEDURE — 2700000000 HC OXYGEN THERAPY PER DAY

## 2021-12-07 PROCEDURE — 78580 LUNG PERFUSION IMAGING: CPT

## 2021-12-07 PROCEDURE — 93010 ELECTROCARDIOGRAM REPORT: CPT | Performed by: INTERNAL MEDICINE

## 2021-12-07 PROCEDURE — 1200000000 HC SEMI PRIVATE

## 2021-12-07 PROCEDURE — A9540 TC99M MAA: HCPCS | Performed by: INTERNAL MEDICINE

## 2021-12-07 PROCEDURE — 6360000002 HC RX W HCPCS: Performed by: INTERNAL MEDICINE

## 2021-12-07 PROCEDURE — 6370000000 HC RX 637 (ALT 250 FOR IP): Performed by: ORTHOPAEDIC SURGERY

## 2021-12-07 PROCEDURE — APPNB45 APP NON BILLABLE 31-45 MINUTES: Performed by: NURSE PRACTITIONER

## 2021-12-07 PROCEDURE — 3430000000 HC RX DIAGNOSTIC RADIOPHARMACEUTICAL: Performed by: INTERNAL MEDICINE

## 2021-12-07 PROCEDURE — 36430 TRANSFUSION BLD/BLD COMPNT: CPT

## 2021-12-07 PROCEDURE — P9016 RBC LEUKOCYTES REDUCED: HCPCS

## 2021-12-07 PROCEDURE — 99024 POSTOP FOLLOW-UP VISIT: CPT | Performed by: NURSE PRACTITIONER

## 2021-12-07 PROCEDURE — 97535 SELF CARE MNGMENT TRAINING: CPT

## 2021-12-07 PROCEDURE — 2580000003 HC RX 258

## 2021-12-07 RX ORDER — SODIUM CHLORIDE 0.9 % (FLUSH) 0.9 %
5-40 SYRINGE (ML) INJECTION 2 TIMES DAILY
Status: DISCONTINUED | OUTPATIENT
Start: 2021-12-07 | End: 2021-12-09 | Stop reason: HOSPADM

## 2021-12-07 RX ORDER — SODIUM CHLORIDE 9 MG/ML
INJECTION, SOLUTION INTRAVENOUS
Status: COMPLETED
Start: 2021-12-07 | End: 2021-12-07

## 2021-12-07 RX ORDER — SODIUM CHLORIDE 9 MG/ML
INJECTION, SOLUTION INTRAVENOUS PRN
Status: DISCONTINUED | OUTPATIENT
Start: 2021-12-07 | End: 2021-12-09 | Stop reason: HOSPADM

## 2021-12-07 RX ADMIN — QUETIAPINE FUMARATE 100 MG: 100 TABLET ORAL at 20:32

## 2021-12-07 RX ADMIN — ENOXAPARIN SODIUM 30 MG: 100 INJECTION SUBCUTANEOUS at 07:53

## 2021-12-07 RX ADMIN — Medication 10 ML: at 08:03

## 2021-12-07 RX ADMIN — INSULIN LISPRO 3 UNITS: 100 INJECTION, SOLUTION INTRAVENOUS; SUBCUTANEOUS at 17:03

## 2021-12-07 RX ADMIN — Medication 10 ML: at 10:48

## 2021-12-07 RX ADMIN — SODIUM CHLORIDE 250 ML: 9 INJECTION, SOLUTION INTRAVENOUS at 02:38

## 2021-12-07 RX ADMIN — POLYETHYLENE GLYCOL 3350 17 G: 17 POWDER, FOR SOLUTION ORAL at 08:02

## 2021-12-07 RX ADMIN — INSULIN LISPRO 3 UNITS: 100 INJECTION, SOLUTION INTRAVENOUS; SUBCUTANEOUS at 20:32

## 2021-12-07 RX ADMIN — SENNOSIDES AND DOCUSATE SODIUM 1 TABLET: 50; 8.6 TABLET ORAL at 07:53

## 2021-12-07 RX ADMIN — Medication 2000 UNITS: at 07:54

## 2021-12-07 RX ADMIN — Medication 20 ML: at 20:39

## 2021-12-07 RX ADMIN — INSULIN LISPRO 3 UNITS: 100 INJECTION, SOLUTION INTRAVENOUS; SUBCUTANEOUS at 12:20

## 2021-12-07 RX ADMIN — QUETIAPINE FUMARATE 100 MG: 100 TABLET ORAL at 07:54

## 2021-12-07 RX ADMIN — LACTULOSE 20 G: 20 SOLUTION ORAL at 07:54

## 2021-12-07 RX ADMIN — TORSEMIDE 60 MG: 20 TABLET ORAL at 07:54

## 2021-12-07 RX ADMIN — BISACODYL 5 MG: 5 TABLET, COATED ORAL at 07:54

## 2021-12-07 RX ADMIN — SENNOSIDES AND DOCUSATE SODIUM 1 TABLET: 50; 8.6 TABLET ORAL at 20:32

## 2021-12-07 RX ADMIN — LACTULOSE 20 G: 20 SOLUTION ORAL at 20:32

## 2021-12-07 RX ADMIN — POTASSIUM CHLORIDE 20 MEQ: 20 TABLET, EXTENDED RELEASE ORAL at 08:02

## 2021-12-07 RX ADMIN — INSULIN LISPRO 3 UNITS: 100 INJECTION, SOLUTION INTRAVENOUS; SUBCUTANEOUS at 07:54

## 2021-12-07 RX ADMIN — APIXABAN 5 MG: 5 TABLET, FILM COATED ORAL at 20:32

## 2021-12-07 RX ADMIN — Medication 10 ML: at 10:31

## 2021-12-07 RX ADMIN — Medication 6.48 MILLICURIE: at 10:31

## 2021-12-07 ASSESSMENT — PAIN SCALES - GENERAL
PAINLEVEL_OUTOF10: 0
PAINLEVEL_OUTOF10: 0

## 2021-12-07 NOTE — PROGRESS NOTES
Dr. Iniguez Pac contacted for PICC line placement. Pt is a hard stick and unable to get a consistent line. Order placed. Notified of H&H changing from 6.8 to 7.3 after 1 unit. H&H changed to Q8. Will continue to monitor.

## 2021-12-07 NOTE — PROGRESS NOTES
Comprehensive Nutrition Assessment    Type and Reason for Visit:  Initial, Wound    Nutrition Recommendations/Plan:   Boost pudding BID  Diet consistency per SLP    Nutrition Assessment:  Pt is at risk for nutrition compromise AEB decreased appetite with increased nutrient needs for wound healing s/p surgery. Noted pt with an abrasion to coccyx. PO intake 1-25% & 26-50% x 2 meals recorded. Will offer Boost pudding BID to help increase protein intake to promote wound healing. Malnutrition Assessment:  Malnutrition Status:  No malnutrition    Context:  Acute Illness            Nutrition Related Findings:  LBM 12/6; +1 genrealized/extremity edema      Wounds:  Surgical Incision       Current Nutrition Therapies:    ADULT DIET; Dysphagia - Soft and Bite Sized; Mildly Thick (Nectar); No Drinking Straws    Anthropometric Measures:  · Height: 5' 11\" (180.3 cm)  · Current Body Weight: 215 lb (97.5 kg)   · Admission Body Weight:      · Usual Body Weight:       · Ideal Body Weight: 155 lbs; % Ideal Body Weight 138.7 %   · BMI: 30  · Adjusted Body Weight:  ; No Adjustment   · Adjusted BMI:      · BMI Categories: Overweight (BMI 25.0-29. 9)       Nutrition Diagnosis:   · Increased nutrient needs related to increase demand for energy/nutrients as evidenced by other (comment) (increased protein needs for healing s/p surgery)      Nutrition Interventions:   Food and/or Nutrient Delivery:  Continue Current Diet, Start Oral Nutrition Supplement  Nutrition Education/Counseling:  Education not indicated   Coordination of Nutrition Care:  Continue to monitor while inpatient    Goals:  po intake at least 50% of meals & supplements       Nutrition Monitoring and Evaluation:   Behavioral-Environmental Outcomes:  None Identified   Food/Nutrient Intake Outcomes:  Food and Nutrient Intake, Supplement Intake  Physical Signs/Symptoms Outcomes:  Weight, Skin     Discharge Planning:     Too soon to determine     Electronically signed by Eli Rivero RD, LD on 12/7/21 at 2:03 PM EST    Contact: [phone]

## 2021-12-07 NOTE — PROGRESS NOTES
PICC placed. Consent obtained from Amira Cortez) via telephone. Dressing to right hip saturated, changed, new dressing placed. Assessment complete. Pt currently resting comfortably. Will continue to monitor.

## 2021-12-07 NOTE — PLAN OF CARE
Nutrition Problem #1: Increased nutrient needs  Intervention: Food and/or Nutrient Delivery: Continue Current Diet, Start Oral Nutrition Supplement  Nutritional Goals: po intake at least 50% of meals & supplements

## 2021-12-07 NOTE — PROGRESS NOTES
Physical Therapy  Facility/Department: [address] ORTHO/NEURO NURSING  Daily Treatment Note  NAME: Becki Tobar  : 1935  MRN: [id number]    Date of Service: 2021    Discharge Recommendations:  Becki Tobar scored a  on the AM-PAC short mobility form. Current research shows that an AM-PAC score of 17 or less is typically not associated with a discharge to the patient's home setting. Based on the patient's AM-PAC score and their current functional mobility deficits, it is recommended that the patient have 3-5 sessions per week of Physical Therapy at d/c to increase the patient's independence. Please see assessment section for further patient specific details. If patient discharges prior to next session this note will serve as a discharge summary. Please see below for the latest assessment towards goals. 24 hour supervision or assist, 3-5 sessions per week   PT Equipment Recommendations  Equipment Needed: No  Other: Defer to next level of care. Assessment   Body structures, Functions, Activity limitations: Decreased functional mobility ; Increased pain; Decreased ADL status; Decreased balance; Decreased posture; Decreased ROM; Decreased strength; Decreased endurance  Assessment: Pt was able to participate more in therapy on this date but still required dependent assist x2 for bed mobility and modA-maxA for sitting balance. Pt baseline is still unknown but does have baseline cognitive deficits that continue to limit therapy. Pt will beenfit from continued therapy to address deficits with continued therapy after d/c. Treatment Diagnosis: functional mobility deficits secondary to acute injury  Prognosis: Fair  PT Education: Goals; PT Role; Plan of Care; Transfer Training; General Safety; Weight-bearing Education; Functional Mobility Training  Patient Education: Pt unable to verbalize understanding and will require reinforcement.   REQUIRES PT FOLLOW UP: Yes  Activity Tolerance  Activity Tolerance: Patient Tolerated treatment well; Patient limited by cognitive status  Activity Tolerance: Pt was limited by cognitive status making it difficult for the pt to complete mobility. Pt did not experience pain throughout session and tolerated sitting well. Sitting time was limited by back pain which subsided with support. Patient Diagnosis(es): The primary encounter diagnosis was Closed displaced fracture of lesser trochanter of right femur, initial encounter ([facility] [address] Diagnoses of Fall on same level from slipping, tripping or stumbling, initial encounter and Frequent falls were also pertinent to this visit. has a past medical history of Anemia, Colon cancer ([facility] [address] Diabetes mellitus ([facility] [address] Fall, Gout, Hypercholesterolemia, Hypertension, Liver disease, Memory loss, Pulmonary embolism ([facility] [address] Rosacea, and Vitamin D deficiency. has a past surgical history that includes Cataract removal (Bilateral, 2010); Cholecystectomy; Dilation and curettage of uterus; Liver Resection (Right); Hip Arthroplasty (Left, 2012); total nephrectomy (Left); and colectomy. Restrictions  Restrictions/Precautions  Restrictions/Precautions: Fall Risk, Weight Bearing (high fall risk, 50% WB on RLE)  Required Braces or Orthoses?: No  Lower Extremity Weight Bearing Restrictions  Right Lower Extremity Weight Bearing: Partial Weight Bearing  Partial Weight Bearing Percentage Or Pounds: 50%  Position Activity Restriction  Other position/activity restrictions: Patient s/p fall w/ R hip pain, diagnosed w/ right proximal femur fx s/p IM nail 12/4/2021, 50% WB RLE. Subjective   General  Chart Reviewed: Yes  Response To Previous Treatment: Patient with no complaints from previous session. Family / Caregiver Present: No  Subjective  Subjective: Pt was agreeable to therapy and did not report pain. Pt was oriented to self but was not aware of the situation. Pt was speaking about things that did not happen but remained pleasant.   General Comment  Comments: Pt was resting supine in bed. Orientation  Orientation  Overall Orientation Status: Impaired  Orientation Level: Oriented to person; Disoriented to situation; Disoriented to time; Disoriented to place  Cognition      Objective   Bed mobility  Rolling to Left: Dependent/Total; 2 Person assistance  Rolling to Right: Dependent/Total; 2 Person assistance  Supine to Sit: Dependent/Total; 2 Person assistance  Sit to Supine: Dependent/Total; 2 Person assistance  Scooting: Dependent/Total; 2 Person assistance  Comment: Pt had difficulty with initiation of tasks and required assist of 2 for all mobility. Increased time was required to complete tasks. Transfers  Comment: Not attempted due to dependent mobility with bed mobility. Ambulation  Ambulation?: No  Stairs/Curb  Stairs?: No     Balance  Posture: Fair (forward flexed posture.)  Sitting - Static: Fair; -  Sitting - Dynamic: Poor  Comments: Pt was able to sit with SBA briefly for ~10 seconds but required mod A to max A for sitting balance other than this brief period. Pt sat EOB for ~15 minutes. Exercises  Hip Flexion: completed passively in supine and seated EOB x10 in each position on R LE. Able to actively attempt but unable to lift foot off of ground. Knee Long Arc Quad: completed seated EOB with AAROM at end range x10 B. Patient able to follow commands with vc to continue mvmt. Ankle Pumps: completed passively in supine x5 on R LE. Attempted sitting EOB actively but was unable to follow directions. Pt was limited in ankle df barely achieving neutral.                                     AM-PAC Score  AM-PAC Inpatient Mobility Raw Score : 7 (12/07/21 1129)  AM-PAC Inpatient T-Scale Score : 26.42 (12/07/21 1129)  Mobility Inpatient CMS 0-100% Score: 92.36 (12/07/21 1129)  Mobility Inpatient CMS G-Code Modifier : CM (12/07/21 1129)          Goals  Short term goals  Time Frame for Short term goals: Discharge.  - no goals met this date (12/7)  Short term goal 1: Patient will perform bed mobility w/ MOD assist.  Short term goal 2: Patient will transfer supine-sit w/ MOD assist.  Short term goal 3: Patient will perform sit-stand w/ MAX assist w/ LRAD vs. Ritesh Bodo maintaining 50% WB status. Short term goal 4: Patient will transfer bed-chair w/ MAX assist maintaining 50% WB status. Short term goal 5: Patient will transfer bed-chair w/ slideboard and MAX assist.  Patient Goals   Patient goals : Unable to verbalize. Plan    Plan  Times per week: 7  Times per day: Daily  Current Treatment Recommendations: Strengthening, Home Exercise Program, ROM, Safety Education & Training, Balance Training, Endurance Training, Patient/Caregiver Education & Training, Functional Mobility Training, Transfer Training, Gait Training, Equipment Evaluation, Education, & procurement  Safety Devices  Type of devices: All fall risk precautions in place, Call light within reach, Bed alarm in place, Left in bed, Nurse notified  Restraints  Initially in place: No     Therapy Time   Individual Concurrent Group Co-treatment   Time In       [facility]   Minutes       41   Timed Code Treatment Minutes: ALBIN [address], [facility], [id number]  Therapist was present, directed the patient's care, made skilled judgement, and was responsible for assessment and treatment of the patient.

## 2021-12-07 NOTE — PROGRESS NOTES
Arrived to place PICC line in patient with Sergei Hernandez RN at bedside, pre procedure and allergies reviewed, no issues accessing basilic vein, pt tolerated well, blood return and flushed well, tip verified with 3cg technology. Pt left in stable condition and bed braked and in lowest position. Pt call light within reach. Handoff to RN.

## 2021-12-07 NOTE — PLAN OF CARE
Problem: Falls - Risk of:  Goal: Will remain free from falls  Description: Will remain free from falls  Outcome: Ongoing  Goal: Absence of physical injury  Description: Absence of physical injury  Outcome: Ongoing     Problem: Pain:  Goal: Pain level will decrease  Description: Pain level will decrease  Outcome: Ongoing  Goal: Control of acute pain  Description: Control of acute pain  Outcome: Ongoing  Goal: Control of chronic pain  Description: Control of chronic pain  Outcome: Ongoing     Problem: Skin Integrity:  Goal: Will show no infection signs and symptoms  Description: Will show no infection signs and symptoms  Outcome: Ongoing  Goal: Absence of new skin breakdown  Description: Absence of new skin breakdown  Outcome: Ongoing  Goal: Demonstration of wound healing without infection will improve  Description: Demonstration of wound healing without infection will improve  Outcome: Ongoing  Goal: Risk for impaired skin integrity will decrease  Description: Risk for impaired skin integrity will decrease  Outcome: Ongoing     Problem:  Activity:  Goal: Ability to ambulate will improve  Description: Ability to ambulate will improve  Outcome: Ongoing  Goal: Ability to perform activities at highest level will improve  Description: Ability to perform activities at highest level will improve  Outcome: Ongoing     Problem: Health Behavior:  Goal: Identification of resources available to assist in meeting health care needs will improve  Description: Identification of resources available to assist in meeting health care needs will improve  Outcome: Ongoing     Problem: Nutritional:  Goal: Ability to attain and maintain optimal nutritional status will improve  Description: Ability to attain and maintain optimal nutritional status will improve  Outcome: Ongoing     Problem: Physical Regulation:  Goal: Will remain free from infection  Description: Will remain free from infection  Outcome: Ongoing  Goal: Postoperative complications will be avoided or minimized  Description: Postoperative complications will be avoided or minimized  Outcome: Ongoing  Goal: Diagnostic test results will improve  Description: Diagnostic test results will improve  Outcome: Ongoing     Problem: Respiratory:  Goal: Ability to maintain adequate ventilation will improve  Description: Ability to maintain adequate ventilation will improve  Outcome: Ongoing     Problem: Safety:  Goal: Ability to remain free from injury will improve  Description: Ability to remain free from injury will improve  Outcome: Ongoing     Problem: Self-Care:  Goal: Ability to meet self-care needs will improve  Description: Ability to meet self-care needs will improve  Outcome: Ongoing     Problem: Self-Concept:  Goal: Ability to maintain and perform role responsibilities to the fullest extent possible will improve  Description: Ability to maintain and perform role responsibilities to the fullest extent possible will improve  Outcome: Ongoing  Goal: Verbalizations of decreased anxiety will increase  Description: Verbalizations of decreased anxiety will increase  Outcome: Ongoing     Problem: Sensory:  Goal: Pain level will decrease  Description: Pain level will decrease  Outcome: Ongoing     Problem: Tissue Perfusion:  Goal: Peripheral tissue perfusion will improve  Description: Peripheral tissue perfusion will improve  Outcome: Ongoing  Goal: Risk of venous thrombosis will decrease  Description: Risk of venous thrombosis will decrease  Outcome: Ongoing     Problem: Urinary Elimination:  Goal: Ability to reestablish a normal urinary elimination pattern will improve - after catheter removal  Description: Ability to reestablish a normal urinary elimination pattern will improve  Outcome: Ongoing     Problem: Non-Violent Restraints  Goal: Removal from restraints as soon as assessed to be safe  Outcome: Ongoing  Goal: No harm/injury to patient while restraints in use  Outcome: Ongoing  Goal: Patient's dignity will be maintained  Outcome: Ongoing     Problem: Musculor/Skeletal Functional Status  Goal: Highest potential functional level  Outcome: Ongoing  Goal: Absence of falls  Outcome: Ongoing

## 2021-12-07 NOTE — PROGRESS NOTES
Progress Note - Dr. Azul James - Internal Medicine  PCP: Marisela Khan [address] [facility] [address] / [address] [facility] [place] [address] [place] [facility] Day: 4  Code Status: DNR-CC  Current Diet: ADULT DIET; Dysphagia - Soft and Bite Sized; Mildly Thick (Nectar); No Drinking Straws        CC: follow up on medical issues    Subjective:   Trent Carter is a 81 y.o. female. Pt seen and examined  Chart reviewed since last visit, labs and imaging below        Urine cx with enterococcus, sens   Susceptibility     Enterococcus  faecalis     BACTERIAL SUSCEPTIBILITY PANEL BY MARY     ampicillin <=2 mcg/mL Sensitive     nitrofurantoin <=16 mcg/mL Sensitive     tetracycline >=16 mcg/mL Resistant     vancomycin 1 mcg/mL Sensitive        Pt with anemia, worse this am  I have discussed with the patient the rationale for blood component transfusion; its benefits in treating or preventing fatigue, organ damage, or death; and its risk which includes mild transfusion reactions, rare risk of blood borne infection, or more serious but rare reactions. I have discussed the alternatives to transfusion, including the risk and consequences of not receiving transfusion. The patient had an opportunity to ask questions and had agreed to proceed with transfusion of blood components. 1u prbc ordered today    Ortho thinks pt can transfer to SNF when anemia stable      She denies chest pain, denies shortness of breath, denies nausea,  denies emesis. 10 system Review of Systems is reviewed with patient, and pertinent positives are noted in HPI above . Otherwise, Review of systems is negative. I have reviewed the patient's medical and social history in detail and updated the computerized patient record. To recap: She  has a past medical history of Anemia, Colon cancer ([address] Diabetes mellitus ([address] Fall, Gout, Hypercholesterolemia, Hypertension, Liver disease, Memory loss, Pulmonary embolism ([address] Rosacea, and Vitamin D deficiency. . She has a past surgical history that includes Cataract removal (Bilateral, 2010); Cholecystectomy; Dilation and curettage of uterus; Liver Resection (Right); Hip Arthroplasty (Left, 2012); total nephrectomy (Left); and colectomy. . She  reports that she quit smoking about 41 years ago. Her smoking use included cigarettes. She quit after 15.00 years of use. She has never used smokeless tobacco. She reports current alcohol use. She reports that she does not use drugs. .        Active Hospital Problems    Diagnosis Date Noted    DEANNA (acute kidney injury) [facility] [N17.9] 12/03/2021    Closed left subtrochanteric femur fracture ([facility] [S72.22XA] 12/03/2021    Closed subcapital fracture of femur with delayed healing, left [S72.012G] 12/03/2021    Essential hypertension [I10] 05/30/2019    Controlled type 2 diabetes mellitus, without long-term current use of insulin (HCC) [E11.9] 05/30/2019       Current Facility-Administered Medications: 0.9 % sodium chloride infusion, , IntraVENous, PRN  glucose (GLUTOSE) 40 % oral gel 15 g, 15 g, Oral, PRN  dextrose 50 % IV solution, 12.5 g, IntraVENous, PRN  glucagon (rDNA) injection 1 mg, 1 mg, IntraMUSCular, PRN  dextrose 5 % solution, 100 mL/hr, IntraVENous, PRN  0.9 % sodium chloride infusion, , IntraVENous, PRN  lidocaine PF 1 % injection 5 mL, 5 mL, IntraDERmal, Once  enoxaparin (LOVENOX) injection 30 mg, 30 mg, SubCUTAneous, Daily  insulin lispro (1 Unit Dial) 0-18 Units, 0-18 Units, SubCUTAneous, TID WC  insulin lispro (1 Unit Dial) 0-9 Units, 0-9 Units, SubCUTAneous, Nightly  oxyCODONE (ROXICODONE) immediate release tablet 5 mg, 5 mg, Oral, Q4H PRN  HYDROmorphone HCl PF (DILAUDID) injection 0.5 mg, 0.5 mg, IntraVENous, Q3H PRN  ondansetron (ZOFRAN) injection 4 mg, 4 mg, IntraVENous, Q6H PRN  polyethylene glycol (GLYCOLAX) packet 17 g, 17 g, Oral, Daily  bisacodyl (DULCOLAX) EC tablet 5 mg, 5 mg, Oral, Daily  sennosides-docusate sodium (SENOKOT-S) 8.6-50 MG tablet 1 tablet, 1 tablet, Oral, BID  acetaminophen (TYLENOL) tablet 650 mg, 650 mg, Oral, Q4H PRN  0.9 % sodium chloride infusion, , IntraVENous, PRN  lactulose (CHRONULAC) 10 GM/15ML solution 20 g, 20 g, Oral, BID  LORazepam (ATIVAN) tablet 1 mg, 1 mg, Oral, BID PRN  potassium chloride (KLOR-CON M) extended release tablet 20 mEq, 20 mEq, Oral, Daily with breakfast  QUEtiapine (SEROQUEL) tablet 100 mg, 100 mg, Oral, BID  torsemide (DEMADEX) tablet 60 mg, 60 mg, Oral, Daily  morphine (PF) injection 2 mg, 2 mg, IntraVENous, Q3H PRN  Vitamin D (CHOLECALCIFEROL) tablet 2,000 Units, 2,000 Units, Oral, Daily  0.9 % sodium chloride infusion, , IntraVENous, Continuous  sodium chloride flush 0.9 % injection 5-40 mL, 5-40 mL, IntraVENous, 2 times per day  sodium chloride flush 0.9 % injection 10 mL, 10 mL, IntraVENous, PRN  0.9 % sodium chloride infusion, 25 mL, IntraVENous, PRN  morphine (PF) injection 2 mg, 2 mg, IntraVENous, Q2H PRN **OR** morphine injection 4 mg, 4 mg, IntraVENous, Q2H PRN  ondansetron (ZOFRAN-ODT) disintegrating tablet 4 mg, 4 mg, Oral, Q8H PRN **OR** [DISCONTINUED] ondansetron (ZOFRAN) injection 4 mg, 4 mg, IntraVENous, Q6H PRN  magnesium hydroxide (MILK OF MAGNESIA) 400 MG/5ML suspension 30 mL, 30 mL, Oral, Daily PRN  hydrALAZINE (APRESOLINE) injection 10 mg, 10 mg, IntraVENous, Q6H PRN  0.9 % sodium chloride bolus, 500 mL, IntraVENous, PRN  morphine (PF) injection 1 mg, 1 mg, IntraVENous, Q4H PRN  glucose (GLUTOSE) 40 % oral gel 15 g, 15 g, Oral, PRN  dextrose 50 % IV solution, 12.5 g, IntraVENous, PRN  glucagon (rDNA) injection 1 mg, 1 mg, IntraMUSCular, PRN  dextrose 5 % solution, 100 mL/hr, IntraVENous, PRN         Objective:  BP (!) 130/90   Pulse 78   Temp 98.6 °F (37 °C) (Oral)   Resp 16   Ht 5' 11\" (1.803 m)   Wt 215 lb (97.5 kg)   LMP  (LMP Unknown) Comment: post-menopausal  SpO2 97%   BMI 29.99 kg/m²      Patient Vitals for the past 24 hrs:   BP Temp Temp src Pulse Resp SpO2   12/07/21 0745 (!) 130/90 98.6 °F (37 °C) Oral 78 16 97 %   12/07/21 0454 (!) 130/53 98.3 °F (36.8 °C) Temporal 92 18 96 %   12/07/21 0251 (!) 116/52 98 °F (36.7 °C) Temporal 90 18 96 %   12/07/21 0225 123/61 98.1 °F (36.7 °C) Temporal 84 18 96 %   12/06/21 2315 115/62 98.7 °F (37.1 °C) Oral 86 18 97 %   12/06/21 2015 127/68 98.5 °F (36.9 °C) Oral 80 18 96 %   12/06/21 1615 (!) 109/52 99.1 °F (37.3 °C) Oral 84 18 97 %   12/06/21 1607 (!) 120/58 99 °F (37.2 °C) Oral 84 20 --   12/06/21 1252 132/69 99.2 °F (37.3 °C) Axillary 94 20 --   12/06/21 1152 (!) 109/50 99.4 °F (37.4 °C) Axillary 90 20 --   12/06/21 1137 125/61 98.1 °F (36.7 °C) Axillary 92 20 --   12/06/21 0900 (!) 111/49 98.2 °F (36.8 °C) Axillary 87 20 --     Patient Vitals for the past 96 hrs (Last 3 readings):   Weight   12/05/21 0600 215 lb (97.5 kg)   12/03/21 2051 206 lb 14.4 oz (93.8 kg)   12/03/21 1607 135 lb (61.2 kg)           Intake/Output Summary (Last 24 hours) at 12/7/2021 0859  Last data filed at 12/7/2021 6111  Gross per 24 hour   Intake 1292.09 ml   Output 4025 ml   Net -2732.91 ml         Physical Exam:   Vitals as above  General appearance: alert, appears stated age and cooperative    Head: Normocephalic, without obvious abnormality, atraumatic  Lungs: clear to auscultation bilaterally    Heart: regular rate and rhythm, S1, S2 normal, no murmur    Abdomen: soft, non-tender; bowel sounds normal; no masses, no organomegaly    Extremities: extremities normal, atraumatic, no cyanosis, no edema ; dressing CDI    Labs:  Lab Results   Component Value Date    WBC 8.8 12/07/2021    HGB 8.0 (L) 12/07/2021    HCT 23.9 (L) 12/07/2021     12/07/2021    ALT 11 12/04/2021    AST 19 12/04/2021     12/07/2021    K 3.9 12/07/2021     (H) 12/07/2021    CREATININE 1.0 12/07/2021    BUN 37 (H) 12/07/2021    CO2 20 (L) 12/07/2021    INR 1.15 (H) 12/03/2021    LABA1C 7.0 12/03/2021    LABMICR YES 12/03/2021     Lab Results   Component Value Date    TROPONINI <0.01 12/06/2021       Recent Imaging Results are Reviewed:  XR HIP RIGHT (2-3 VIEWS)    Result Date: 12/4/2021  Radiology exam is complete. No Radiologist dictation. Please follow up with ordering provider. XR FEMUR RIGHT (MIN 2 VIEWS)    Addendum Date: 12/3/2021    ADDENDUM: Correction. Acute fractures of the proximal RIGHT femur. Result Date: 12/3/2021  EXAMINATION: ONE XRAY VIEW OF THE PELVIS AND TWO XRAY VIEWS RIGHT HIP;   XRAY VIEWS OF THE RIGHT FEMUR 12/3/2021 5:02 pm COMPARISON: None. HISTORY: ORDERING SYSTEM PROVIDED HISTORY: Fall with injury, shortening and external rotation TECHNOLOGIST PROVIDED HISTORY: Reason for exam:->Fall with injury, shortening and external rotation Reason for Exam: Fall (Pt brought in by ems from [facility] for fall. Pt is c/o right leg pain. Pt has bruising to right forehead from a previous fall.) Acuity: Acute Type of Exam: Initial FINDINGS: Alignment of the pelvis is normal.  There is a left hip total prosthesis in normal alignment. No hardware complication is identified. There is severe osteoarthritis of the right hip, with pronounced joint space narrowing, subchondral sclerosis and osseous hypertrophy, including over growth of the superolateral acetabulum. There is an acute fracture of the lesser trochanter which is displaced medially by 8 mm. There is a spiral fracture of the proximal 3rd of the femoral shaft with foreshortening. The distal segment positioned laterally. There is adjacent hematoma. No fracture of the more distal femur is identified. There is severe osteoarthritis of the knee, including joint space narrowing of the medial compartment. No knee joint effusion is seen. Traumatic acute fractures of the left femur. Displaced fracture of the lesser trochanter. Spiral fracture of the proximal femoral shaft with foreshortening. Severe osteoarthritis of the right hip and knee. Left hip prosthesis, in normal position.      CT Head WO Contrast    Result Date: 12/3/2021  EXAMINATION: CT OF THE HEAD WITHOUT CONTRAST  12/3/2021 6:05 pm TECHNIQUE: CT of the head was performed without the administration of intravenous contrast. Dose modulation, iterative reconstruction, and/or weight based adjustment of the mA/kV was utilized to reduce the radiation dose to as low as reasonably achievable. COMPARISON: 05/28/2019 HISTORY: ORDERING SYSTEM PROVIDED HISTORY: History of fall TECHNOLOGIST PROVIDED HISTORY: Reason for exam:->History of fall Has a \"code stroke\" or \"stroke alert\" been called? ->No Decision Support Exception - unselect if not a suspected or confirmed emergency medical condition->Emergency Medical Condition (MA) Reason for Exam: Fall (Pt brought in by ems from [facility] for fall. Pt is c/o right leg pain. Pt has bruising to right forehead from a previous fall.) Acuity: Acute Type of Exam: Initial Mechanism of Injury: fall FINDINGS: BRAIN/VENTRICLES: The ventricles and sulci are diffusely enlarged. Low attenuation is seen in the periventricular and subcortical white matter. No acute intracranial hemorrhage or acute infarct is identified. ORBITS: The visualized portion of the orbits demonstrate no acute abnormality. SINUSES: The visualized paranasal sinuses and mastoid air cells demonstrate no acute abnormality. SOFT TISSUES/SKULL:  No acute abnormality of the visualized skull or soft tissues. No acute intracranial abnormality. Diffuse atrophic changes with findings suggesting chronic microvascular ischemia     CT Cervical Spine WO Contrast    Result Date: 12/3/2021  EXAMINATION: CT OF THE CERVICAL SPINE WITHOUT CONTRAST 12/3/2021 6:05 pm TECHNIQUE: CT of the cervical spine was performed without the administration of intravenous contrast. Multiplanar reformatted images are provided for review. Dose modulation, iterative reconstruction, and/or weight based adjustment of the mA/kV was utilized to reduce the radiation dose to as low as reasonably achievable.  COMPARISON: None. HISTORY: ORDERING SYSTEM PROVIDED HISTORY: History of fall TECHNOLOGIST PROVIDED HISTORY: Reason for exam:->History of fall Decision Support Exception - unselect if not a suspected or confirmed emergency medical condition->Emergency Medical Condition (MA) Reason for Exam: Fall (Pt brought in by ems from [facility] for fall. Pt is c/o right leg pain. Pt has bruising to right forehead from a previous fall.) Acuity: Acute Type of Exam: Initial Mechanism of Injury: fall FINDINGS: BONES/ALIGNMENT: There is no acute fracture or traumatic malalignment. DEGENERATIVE CHANGES: Multilevel degenerative changes. SOFT TISSUES: There is no prevertebral soft tissue swelling. No acute abnormality of the cervical spine. XR CHEST PORTABLE    Result Date: 12/3/2021  EXAMINATION: ONE XRAY VIEW OF THE CHEST 12/3/2021 5:56 pm COMPARISON: None. HISTORY: ORDERING SYSTEM PROVIDED HISTORY: Right hip fracture, preop TECHNOLOGIST PROVIDED HISTORY: Reason for exam:->Right hip fracture, preop FINDINGS: The lungs are without acute focal process. There is no effusion or pneumothorax. The cardiomediastinal silhouette is without acute process. The osseous structures are without acute process. No acute process. FLUORO FOR SURGICAL PROCEDURES    Result Date: 12/4/2021  Radiology exam is complete. No Radiologist dictation. Please follow up with ordering provider. XR HIP 2-3 VW W PELVIS RIGHT    Addendum Date: 12/3/2021    ADDENDUM: Correction. Acute fractures of the proximal RIGHT femur. Result Date: 12/3/2021  EXAMINATION: ONE XRAY VIEW OF THE PELVIS AND TWO XRAY VIEWS RIGHT HIP;   XRAY VIEWS OF THE RIGHT FEMUR 12/3/2021 5:02 pm COMPARISON: None. HISTORY: ORDERING SYSTEM PROVIDED HISTORY: Fall with injury, shortening and external rotation TECHNOLOGIST PROVIDED HISTORY: Reason for exam:->Fall with injury, shortening and external rotation Reason for Exam: Fall (Pt brought in by ems from [facility] for fall. Pt is c/o right leg pain. Pt has bruising to right forehead from a previous fall.) Acuity: Acute Type of Exam: Initial FINDINGS: Alignment of the pelvis is normal.  There is a left hip total prosthesis in normal alignment. No hardware complication is identified. There is severe osteoarthritis of the right hip, with pronounced joint space narrowing, subchondral sclerosis and osseous hypertrophy, including over growth of the superolateral acetabulum. There is an acute fracture of the lesser trochanter which is displaced medially by 8 mm. There is a spiral fracture of the proximal 3rd of the femoral shaft with foreshortening. The distal segment positioned laterally. There is adjacent hematoma. No fracture of the more distal femur is identified. There is severe osteoarthritis of the knee, including joint space narrowing of the medial compartment. No knee joint effusion is seen. Traumatic acute fractures of the left femur. Displaced fracture of the lesser trochanter. Spiral fracture of the proximal femoral shaft with foreshortening. Severe osteoarthritis of the right hip and knee. Left hip prosthesis, in normal position. Lab Results   Component Value Date    GLUCOSE 160 12/07/2021     Lab Results   Component Value Date    POCGLU 167 12/07/2021     BP (!) 130/90   Pulse 78   Temp 98.6 °F (37 °C) (Oral)   Resp 16   Ht 5' 11\" (1.803 m)   Wt 215 lb (97.5 kg)   LMP  (LMP Unknown) Comment: post-menopausal  SpO2 97%   BMI 29.99 kg/m²     Assessment and Plan:  Principal Problem:    Closed left subtrochanteric femur fracture ([address] -Established problem. Stable. Doing well post op  Plan: Continue present orders/plan. Active Problems:    Controlled type 2 diabetes mellitus, without long-term current use of insulin ([address] -Established problem. Stable. FSBS 167  Plan: cont ccc diet, sliding scale, home meds    Essential hypertension -Established problem. Stable.   130/90  Plan: stay on same meds    DEANNA (acute kidney injury) ([address] -Established problem. Uncontrolled. Creat 1.0 today. Thought to be due to hypotension post op yest and UTI  Plan: cont fluids, repeat labs pending this am    Anemia, acute blood loss postoperatively - hgb 7.0  Plan:  indication for transfusion -1u ordered. Cont to monitor h/h to assess progression of anemia. Recommend ferrous sulfate or MVI as outpatient. Pulmonary embolus - Established problem. Stable. V/q scan with intermediate to hi prob  Plan - cont eliquis, cont o2 as needed    Disp - to SNF today/tomorrow?      (Please note that portions of this note were completed with a voice recognition program.  Efforts were made to edit the dictations but occasionally words are mis-transcribed.)        Gylnn Bautista MD  12/7/2021

## 2021-12-07 NOTE — PROGRESS NOTES
[id number] [facility] Orthopedic Surgery   Progress Note    CHIEF COMPLAINT/DIAGNOSIS: S/p RIGHT hip IM nailing     SUBJECTIVE: The patient is sitting up at the side of the bed working with therapy. Reports mild hip pain. Dressing recently changed. Received another unit PRBC overnight. OBJECTIVE  Physical    VITALS:  BP (!) 130/53   Pulse 92   Temp 98.3 °F (36.8 °C) (Temporal)   Resp 18   Ht 5' 11\" (1.803 m)   Wt 215 lb (97.5 kg)   LMP  (LMP Unknown) Comment: post-menopausal  SpO2 96%   BMI 29.99 kg/m²     GENERAL: Alert and oriented x3, in no acute distress. MUSCULOSKELETAL: Able to dorsi and plantarflex the ankle on operative side without issue. NO TTP about right shoulder. Full passive ROM without pain. She has 4/5 strength with right shoulder abduction/ER. No swelling, ecchymosis, or deformity noted. INCISION:  Covered with bulky dry dressing; mild to moderate bloody drainage. ROM: Limited secondary pain and swelling. Sensory:  Intact to light touch in peroneal and tibial distributions. Vascular:   2+ DP pulses with brisk cap refill;  calf soft and nontender. Data    ALL MEDICATIONS HAVE BEEN REVIEWED    CBC:   Recent Labs     12/05/21 0522 12/05/21  0522 12/06/21  0804 12/06/21  0804 12/06/21  1634 12/07/21  0044 12/07/21  0512   WBC 13.2*  --  10.2  --   --   --  8.8   HGB 8.1*   < > 6.8*   < > 7.3* 7.0* 8.0*   HCT 24.4*   < > 20.1*   < > 21.3* 21.2* 23.9*     --  136  --   --   --  142    < > = values in this interval not displayed. BMP:   Recent Labs     12/05/21  0522 12/06/21  0804 12/07/21  0512    139 144   K 4.6 4.5 3.9    107 111*   CO2 22 23 20*   BUN 47* 46* 37*   CREATININE 2.2* 1.4* 1.0     INR:   No results for input(s): INR in the last 72 hours.     ASSESSMENT:  S/p RIGHT hip IM nailing (12/4/21), POD#3  Acute post-op blood loss anemia  DM II  DEANNA  HTN    PLAN:   - WB status:  50% weight bearing through operative extremity   - DVT prophylaxis: Lovenox as inpt.   Has Xarelto/Eliquis listed on med list.  Given her frequent falls reported, seems like she may not be the best candidate for this. If discontinued, then rec Lovenox 40mg x 28 days post-op  - PT/OT  - Pain Control: tylenol prn. Due to orthopaedic surgical procedure/condition, patient may require pain medication for up to 6-8 weeks. - Acute post op acute blood loss anemia: H/H today: 8/23.9 (1 unit PRBC 12/7, another on 12/6 and another on 12/4. Renal: GFR/Cr improved. - ID:  Ancef x 2 doses post-op completed. - Disposition: SNF when medically ready. Follow-up in four weeks with Dr. Chandrika Avelar.   Office # [phone]    KEREN Abdullahi CNP  12/7/2021  7:38 AM

## 2021-12-07 NOTE — CARE COORDINATION
GORGE spoke with pt's niece, Ochoa Goldberg, who reported she is pt's primary decision maker. Ochoa Yusuf stated pt is no longer at [facility]. Patient moved to [facility], [address] [place], [address]. SW informed of therapy's recommendation for SNF. Niece was agreeable and requested a referral be made to [facility]. Also requested [facility] facility list as she may want pt to have a different facility. GORGE emailed the facility list to [email or web address]. Discharge Plan:  Referral made to [facility]. Niece reviewing list of other facilities and may want other referrals made.     Electronically signed by HANNAH Pinzon, SADE on 12/7/2021 at 6:07 PM

## 2021-12-08 LAB
ANION GAP SERPL CALCULATED.3IONS-SCNC: 9 MMOL/L (ref 3–16)
BASOPHILS ABSOLUTE: 0 K/UL (ref 0–0.2)
BASOPHILS RELATIVE PERCENT: 0.3 %
BUN BLDV-MCNC: 31 MG/DL (ref 7–20)
CALCIUM SERPL-MCNC: 8.5 MG/DL (ref 8.3–10.6)
CHLORIDE BLD-SCNC: 111 MMOL/L (ref 99–110)
CO2: 29 MMOL/L (ref 21–32)
CREAT SERPL-MCNC: 0.9 MG/DL (ref 0.6–1.2)
EOSINOPHILS ABSOLUTE: 0 K/UL (ref 0–0.6)
EOSINOPHILS RELATIVE PERCENT: 0.3 %
GFR AFRICAN AMERICAN: >60
GFR NON-AFRICAN AMERICAN: 59
GLUCOSE BLD-MCNC: 211 MG/DL (ref 70–99)
GLUCOSE BLD-MCNC: 226 MG/DL (ref 70–99)
GLUCOSE BLD-MCNC: 245 MG/DL (ref 70–99)
GLUCOSE BLD-MCNC: 330 MG/DL (ref 70–99)
GLUCOSE BLD-MCNC: 333 MG/DL (ref 70–99)
HCT VFR BLD CALC: 26.4 % (ref 36–48)
HCT VFR BLD CALC: 26.5 % (ref 36–48)
HEMOGLOBIN: 8.8 G/DL (ref 12–16)
HEMOGLOBIN: 8.9 G/DL (ref 12–16)
LYMPHOCYTES ABSOLUTE: 0.6 K/UL (ref 1–5.1)
LYMPHOCYTES RELATIVE PERCENT: 5.2 %
MCH RBC QN AUTO: 31.3 PG (ref 26–34)
MCHC RBC AUTO-ENTMCNC: 33.7 G/DL (ref 31–36)
MCV RBC AUTO: 92.9 FL (ref 80–100)
MONOCYTES ABSOLUTE: 1 K/UL (ref 0–1.3)
MONOCYTES RELATIVE PERCENT: 9.5 %
NEUTROPHILS ABSOLUTE: 9.2 K/UL (ref 1.7–7.7)
NEUTROPHILS RELATIVE PERCENT: 84.7 %
PDW BLD-RTO: 17.2 % (ref 12.4–15.4)
PERFORMED ON: ABNORMAL
PLATELET # BLD: 172 K/UL (ref 135–450)
PMV BLD AUTO: 7.9 FL (ref 5–10.5)
POTASSIUM SERPL-SCNC: 3.7 MMOL/L (ref 3.5–5.1)
RBC # BLD: 2.85 M/UL (ref 4–5.2)
SARS-COV-2, NAAT: NOT DETECTED
SODIUM BLD-SCNC: 149 MMOL/L (ref 136–145)
WBC # BLD: 10.9 K/UL (ref 4–11)

## 2021-12-08 PROCEDURE — 85014 HEMATOCRIT: CPT

## 2021-12-08 PROCEDURE — 6370000000 HC RX 637 (ALT 250 FOR IP): Performed by: INTERNAL MEDICINE

## 2021-12-08 PROCEDURE — 2580000003 HC RX 258: Performed by: INTERNAL MEDICINE

## 2021-12-08 PROCEDURE — 1200000000 HC SEMI PRIVATE

## 2021-12-08 PROCEDURE — 94760 N-INVAS EAR/PLS OXIMETRY 1: CPT

## 2021-12-08 PROCEDURE — 80048 BASIC METABOLIC PNL TOTAL CA: CPT

## 2021-12-08 PROCEDURE — 97530 THERAPEUTIC ACTIVITIES: CPT

## 2021-12-08 PROCEDURE — 6370000000 HC RX 637 (ALT 250 FOR IP): Performed by: ORTHOPAEDIC SURGERY

## 2021-12-08 PROCEDURE — 85018 HEMOGLOBIN: CPT

## 2021-12-08 PROCEDURE — 87635 SARS-COV-2 COVID-19 AMP PRB: CPT

## 2021-12-08 PROCEDURE — 85025 COMPLETE CBC W/AUTO DIFF WBC: CPT

## 2021-12-08 PROCEDURE — 97535 SELF CARE MNGMENT TRAINING: CPT

## 2021-12-08 PROCEDURE — 2700000000 HC OXYGEN THERAPY PER DAY

## 2021-12-08 RX ORDER — LORAZEPAM 1 MG/1
1 TABLET ORAL 2 TIMES DAILY PRN
Qty: 6 TABLET | Refills: 0 | Status: SHIPPED | OUTPATIENT
Start: 2021-12-08 | End: 2021-12-11

## 2021-12-08 RX ADMIN — OXYCODONE 5 MG: 5 TABLET ORAL at 20:12

## 2021-12-08 RX ADMIN — INSULIN LISPRO 12 UNITS: 100 INJECTION, SOLUTION INTRAVENOUS; SUBCUTANEOUS at 11:50

## 2021-12-08 RX ADMIN — SENNOSIDES AND DOCUSATE SODIUM 1 TABLET: 50; 8.6 TABLET ORAL at 08:04

## 2021-12-08 RX ADMIN — POLYETHYLENE GLYCOL 3350 17 G: 17 POWDER, FOR SOLUTION ORAL at 08:04

## 2021-12-08 RX ADMIN — Medication 10 ML: at 20:12

## 2021-12-08 RX ADMIN — APIXABAN 5 MG: 5 TABLET, FILM COATED ORAL at 20:12

## 2021-12-08 RX ADMIN — SENNOSIDES AND DOCUSATE SODIUM 1 TABLET: 50; 8.6 TABLET ORAL at 20:12

## 2021-12-08 RX ADMIN — Medication 2000 UNITS: at 08:04

## 2021-12-08 RX ADMIN — Medication 10 ML: at 09:42

## 2021-12-08 RX ADMIN — INSULIN LISPRO 6 UNITS: 100 INJECTION, SOLUTION INTRAVENOUS; SUBCUTANEOUS at 20:13

## 2021-12-08 RX ADMIN — TORSEMIDE 60 MG: 20 TABLET ORAL at 08:04

## 2021-12-08 RX ADMIN — INSULIN LISPRO 6 UNITS: 100 INJECTION, SOLUTION INTRAVENOUS; SUBCUTANEOUS at 17:55

## 2021-12-08 RX ADMIN — Medication 10 ML: at 20:33

## 2021-12-08 RX ADMIN — LACTULOSE 20 G: 20 SOLUTION ORAL at 08:04

## 2021-12-08 RX ADMIN — INSULIN LISPRO 6 UNITS: 100 INJECTION, SOLUTION INTRAVENOUS; SUBCUTANEOUS at 08:04

## 2021-12-08 RX ADMIN — BISACODYL 5 MG: 5 TABLET, COATED ORAL at 09:42

## 2021-12-08 RX ADMIN — QUETIAPINE FUMARATE 100 MG: 100 TABLET ORAL at 20:11

## 2021-12-08 RX ADMIN — APIXABAN 5 MG: 5 TABLET, FILM COATED ORAL at 08:04

## 2021-12-08 RX ADMIN — POTASSIUM CHLORIDE 20 MEQ: 20 TABLET, EXTENDED RELEASE ORAL at 08:04

## 2021-12-08 RX ADMIN — LACTULOSE 20 G: 20 SOLUTION ORAL at 20:12

## 2021-12-08 RX ADMIN — QUETIAPINE FUMARATE 100 MG: 100 TABLET ORAL at 08:04

## 2021-12-08 ASSESSMENT — PAIN SCALES - GENERAL
PAINLEVEL_OUTOF10: 0
PAINLEVEL_OUTOF10: 5
PAINLEVEL_OUTOF10: 0

## 2021-12-08 NOTE — CARE COORDINATION
Met with pt's niece, Marcus Treviño, in the room who reported she would like to keep [facility] as the first choice and [facility] as a back up. Case mgmt spoke with Chantelle this morning who received the referral for [facility] and started precert today. Tentative 6pm transport was set with [facility] today in case we get the precert back today. Waiting to hear back from [facility]. Discharge Plan:  [facility] when precert is approved. Tentative 6pm transport w/[facility] set for tonight.     Electronically signed by Jefe Heaton, MSW, LSW on 12/8/2021 at 12:14 PM

## 2021-12-08 NOTE — PROGRESS NOTES
Occupational Therapy  Facility/Department: [address] ORTHO/NEURO NURSING  Daily Treatment Note  NAME: Mendel Cobia  : 1935  MRN: [id number]    Date of Service: 2021    Discharge Recommendations:    Mendel Cobia scored a 9/24 on the AM-PAC ADL Inpatient form. Current research shows that an AM-PAC score of 17 or less is typically not associated with a discharge to the patient's home setting. Based on the patient's AM-PAC score and their current ADL deficits, it is recommended that the patient have 3-5 sessions per week of Occupational Therapy at d/c to increase the patient's independence. Please see assessment section for further patient specific details. If patient discharges prior to next session this note will serve as a discharge summary. Please see below for the latest assessment towards goals. OT Equipment Recommendations  Equipment Needed: No  Other: defer to next level of care    Assessment   Performance deficits / Impairments: Decreased functional mobility ; Decreased endurance; Decreased ADL status; Decreased cognition; Decreased strength; Decreased safe awareness; Decreased balance  Assessment: Pt is currently functioning below occupational baseline and demo the deficits listed above. Pt is currently requiring max(A/dependentn)x2 for all bed mobility and unable to assess standing activities and ADLs this date d/t decreased BP. Pt is significantly limited by pain and decreased weightbearing through RLE. [facility] Pt would benefit from continued skilled OT services to address these deficits and increase IND, safety, and ease with all occupational pursuits  Treatment Diagnosis: Decreased ADL status, functional mobility, and functional transfers d/t Closed left subtrochanteric femur fracture ([facility] [address]  Prognosis: Good; Fair  OT Education: OT Role; Plan of Care; Orientation; Precautions;  ADL Adaptive Strategies  Patient Education: pt is not an IND learner and will require reinforcement  Barriers to Learning: cognition  REQUIRES OT FOLLOW UP: Yes  Activity Tolerance  Activity Tolerance: Patient Tolerated treatment well; Patient limited by fatigue; Treatment limited secondary to decreased cognition  Safety Devices  Safety Devices in place: Yes  Type of devices: All fall risk precautions in place; Call light within reach; Bed alarm in place; Patient at risk for falls; Left in bed; Nurse notified         Patient Diagnosis(es): The primary encounter diagnosis was Closed displaced fracture of lesser trochanter of right femur, initial encounter ([facility] [address] Diagnoses of Fall on same level from slipping, tripping or stumbling, initial encounter, Frequent falls, and Anxiety were also pertinent to this visit. has a past medical history of Anemia, Colon cancer ([address] Diabetes mellitus ([address] Fall, Gout, Hypercholesterolemia, Hypertension, Liver disease, Memory loss, Pulmonary embolism ([address] Rosacea, and Vitamin D deficiency. has a past surgical history that includes Cataract removal (Bilateral, 2010); Cholecystectomy; Dilation and curettage of uterus; Liver Resection (Right); Hip Arthroplasty (Left, 2012); total nephrectomy (Left); colectomy; and Hip fracture surgery (Right, 12/4/2021). Restrictions  Restrictions/Precautions  Restrictions/Precautions: Fall Risk, Weight Bearing (high fall risk, 50% WB on RLE)  Required Braces or Orthoses?: No  Lower Extremity Weight Bearing Restrictions  Right Lower Extremity Weight Bearing: Partial Weight Bearing  Partial Weight Bearing Percentage Or Pounds: 50%  Position Activity Restriction  Other position/activity restrictions: Patient s/p fall w/ R hip pain, diagnosed w/ right proximal femur fx s/p IM nail 12/4/2021, 50% WB RLE.   Subjective   General  Chart Reviewed: Yes  Patient assessed for rehabilitation services?: Yes  Additional Pertinent Hx: PMH: Anemia, Colon cancer ([address] Diabetes mellitus ([address] Fall, Gout, Hypercholesterolemia, Hypertension, Liver disease, Memory loss, Pulmonary embolism ([facility] Rosacea, and Vitamin D deficiency. Response to previous treatment: Patient unable to report, no changes reported from family or staff  Family / Caregiver Present: No  Referring Practitioner: Pedro Contreras MD  Diagnosis: Closed left subtrochanteric femur fracture ([facility] [address]  Subjective  Subjective: Pt supine in bed upon confused but agreeable to OT/PT co-treatment  Vital Signs  Patient Currently in Pain: Denies   Orientation  Orientation  Overall Orientation Status: Impaired  Orientation Level: Oriented to person; Disoriented to place; Disoriented to time; Disoriented to situation  Objective    ADL  Feeding: Setup; Beverage management;  Thickened liquids; Verbal cueing (max A to maintain sitting balance EOB to consume lunch ~12 minutes)  Grooming: Setup; Verbal cueing (pt demo'd ability to wash face with warm wipe)  Toileting: Dependent/Total (pt with BM smear, total A for rear hygiene)        Balance  Sitting Balance: Maximum assistance (varied between CGA-max A, depending on air mattress level)  Standing Balance: Unable to assess(comment) (patient with decreased safety due to cognition, leaning to L, unsafe to attempt)  Standing Balance  Comment: no standing activities complete this date d/t decreased weightbearing while seated and significantly increased pain with movement, fatiguing quickly seated EOB  Bed mobility  Rolling to Left: 2 Person assistance; Dependent/Total  Rolling to Right: Dependent/Total; 2 Person assistance  Supine to Sit: Dependent/Total; 2 Person assistance  Sit to Supine: Dependent/Total; 2 Person assistance  Scooting: Dependent/Total; 2 Person assistance  Comment: max/dependent of 2 to perform all transitional movements, cueing for initiation/hand placement/sequence  Transfers  Transfer Comments: unable to assess pt with decreased weigthbearing through RLE while seated EOB       Cognition  Overall Cognitive Status: Exceptions  Arousal/Alertness: Delayed responses to term goal 5: pt will complete LB ADLs with max(A)x1-ongoing 12/7, 12/8  Long term goals  Time Frame for Long term goals : LTG=STG  Patient Goals   Patient goals : pt did not state       Therapy Time   Individual Concurrent Group Co-treatment   Time In       1439   Time Out       1518   Minutes       39      Timed Code Treatment Minutes:  39 Minutes  Total Treatment Minutes:  [address], [address]

## 2021-12-08 NOTE — DISCHARGE INSTR - COC
Continuity of Care Form    Patient Name: Esau Jaimes   :  1935  MRN:  [id number]    Admit date:  12/3/2021  Discharge date:  2021    Code Status Order: [facility]   Advance Directives:   Advance Care Flowsheet Documentation       Date/Time Healthcare Directive Type of Healthcare Directive Copy in [address] Agent's Name Healthcare Agent's Phone Number    [phone] Yes, patient has an advance directive for healthcare treatment Durable power of  for health care No, copy requested from clinic Healthcare power of  Simba Bell [phone]            Admitting Physician:  John Weems MD  PCP: Jacquelin Parikh    Discharging Nurse:  [facility] Unit/Room#: 8VH-1093/6413-31  Discharging Unit Phone Number: [phone]    Emergency Contact:   Extended Emergency Contact Information  Primary Emergency Contact: Jyothi Smart  Home Phone: [phone]  Relation: Brother/Sister  Secondary Emergency Contact: Huma Jarrett  Relation: Brother/Sister    Past Surgical History:  Past Surgical History:   Procedure Laterality Date    CATARACT REMOVAL Bilateral     CHOLECYSTECTOMY      COLECTOMY      DILATION AND CURETTAGE OF UTERUS      HIP ARTHROPLASTY Left 2012    HIP FRACTURE SURGERY Right 2021    RIGHT HIP INTERMEDULLARY NAIL, SYNTHESE, HANA TABLE, C-ARM performed by Maggy Stewart MD at [address] [place] [address] Right     TOTAL NEPHRECTOMY Left     d/t hydronephrosis       Immunization History:   Immunization History   Administered Date(s) Administered    Influenza Vaccine, unspecified formulation 10/12/2015, 2018    Influenza, Triv, inactivated, subunit, adjuvanted, IM (Fluad 65 yrs and older) 10/13/2018    Pneumococcal Conjugate 13-valent (Rncknsl89) 2015    Pneumococcal Polysaccharide (Bctglvefg00) 2000    Td, unspecified formulation 2001, 2010    Tdap (Boostrix, Adacel) 2019    Zoster Live (Zostavax) 09/01/2008       Active Problems:  Patient Active Problem List   Diagnosis Code    Pulmonary embolus (HCC) I26.99    Controlled type 2 diabetes mellitus, without long-term current use of insulin ([facility] [address] E11.9    Essential hypertension I10    Dislocation of right shoulder joint S43.004A    History of colon cancer Z85.038    DEANNA (acute kidney injury) ([facility] [address] N17.9    Closed left subtrochanteric femur fracture ([facility] [address] S72.22XA    Closed subcapital fracture of femur with delayed healing, left S72.012G       Isolation/Infection:   Isolation            No Isolation          Patient Infection Status       None to display            Nurse Assessment:  Last Vital Signs: /70   Pulse 91   Temp 98.6 °F (37 °C) (Oral)   Resp 16   Ht 5' 11\" (1.803 m)   Wt 215 lb (97.5 kg)   LMP  (LMP Unknown) Comment: post-menopausal  SpO2 94%   BMI 29.99 kg/m²     Last documented pain score (0-10 scale): Pain Level: 0  Last Weight:   Wt Readings from Last 1 Encounters:   12/05/21 215 lb (97.5 kg)     Mental Status:  disoriented and alert    IV Access:  - None    Nursing Mobility/ADLs:  Walking   Dependent  Transfer  Dependent  Bathing  Dependent  Dressing  Dependent  Toileting  Dependent  Feeding  Independent after set up  Med Admin  Dependent  Med Delivery   whole and prefers mixed with apple sauce    Wound Care Documentation and Therapy:        Elimination:  Continence: Bowel: No  Bladder: No  Urinary Catheter: Insertion Date: 12/03/2021    Colostomy/Ileostomy/Ileal Conduit: No       Date of Last BM: 12/09/2021    Intake/Output Summary (Last 24 hours) at 12/8/2021 0836  Last data filed at 12/8/2021 0303  Gross per 24 hour   Intake 240 ml   Output 3575 ml   Net -3335 ml     I/O last 3 completed shifts: In: 240 [P.O.:240]  Out: [address] [Urine:3575]    Safety Concerns:      At Risk for Falls and Aspiration Risk    Impairments/Disabilities:      None    Nutrition Therapy:  Current Nutrition Therapy:   - Oral Diet:  Dental Soft    Routes of Feeding: Oral  Liquids: Nectar Thick Liquids  Daily Fluid Restriction: no  Last Modified Barium Swallow with Video (Video Swallowing Test): not done    Treatments at the Time of Hospital Discharge:   Respiratory Treatments: NA  Oxygen Therapy:  is not on home oxygen therapy. Ventilator:    - No ventilator support    Rehab Therapies: Physical Therapy, Occupational Therapy, and Speech/Language Therapy  Weight Bearing Status/Restrictions: No weight bearing restirctions  Other Medical Equipment (for information only, NOT a DME order):  hospital bed  Other Treatments: NA    Patient's personal belongings (please select all that are sent with patient):  None    RN SIGNATURE:  Electronically signed by Alona Reid RN on 12/9/21 at 10:27 AM EST    CASE MANAGEMENT/SOCIAL WORK SECTION    Inpatient Status Date: 12/03/21    Readmission Risk Assessment Score:  Readmission Risk              Risk of Unplanned Readmission:  17           Discharging to Facility/ Agency   Name:  [facility]  Address:  [address] [place] [address]ί[facility]  Phone:  [phone]  Fax:  [phone]      / signature: Electronically signed by HANNAH Bhandari, RAHULW on 12/8/21 at 3:48 PM EST    PHYSICIAN SECTION    Prognosis: Guarded    Condition at Discharge: Stable    Rehab Potential (if transferring to Rehab): Good    Recommended Labs or Other Treatments After Discharge: ***    Physician Certification: I certify the above information and transfer of Thong Echavarria  is necessary for the continuing treatment of the diagnosis listed and that she requires [facility] for greater 30 days.      Update Admission H&P: No change in H&P    PHYSICIAN SIGNATURE:  Electronically signed by Dale Enciso MD on 12/8/21 at 8:36 AM EST

## 2021-12-08 NOTE — CARE COORDINATION
SW left Tiki at [facility] 2 messages and never heard back about pt's precertification. Tranportation was cancelled for this evening with [facility] and rescheduled for tomorrow at 2pm.    Discharge Plan:  Transport w/[facility] scheduled for tomorrow at 2pm pending precert to [facility].     Electronically signed by HANNAH Ramos LSW on 12/8/2021 at 5:03 PM

## 2021-12-08 NOTE — PROGRESS NOTES
Pt assessment complete. Dressing to right hip saturated and new dressing placed. Pt currently resting comfortably. Will continue to monitor.

## 2021-12-08 NOTE — PROGRESS NOTES
Physical Therapy  Facility/Department: [address] ORTHO/NEURO NURSING  Daily Treatment Note  NAME: Quoc Duarte  : 1935  MRN: [id number]    Date of Service: 2021    Discharge Recommendations: Quoc Duarte scored a 6/24 on the AM-PAC short mobility form. Current research shows that an AM-PAC score of 17 or less is typically not associated with a discharge to the patient's home setting. Based on the patient's AM-PAC score and their current functional mobility deficits, it is recommended that the patient have 3-5 sessions per week of Physical Therapy at d/c to increase the patient's independence. Please see assessment section for further patient specific details. If patient discharges prior to next session this note will serve as a discharge summary. Please see below for the latest assessment towards goals. 24 hour supervision or assist, 3-5 sessions per week   PT Equipment Recommendations  Equipment Needed: No  Other: Defer to next level of care. Assessment   Body structures, Functions, Activity limitations: Decreased functional mobility ; Increased pain; Decreased ADL status; Decreased balance; Decreased posture; Decreased ROM; Decreased strength; Decreased endurance  Assessment: Pt seemed to be able to participate more during this therapy session despite continued 2 person assist for all bed mobility. Pt is however able to tolerate sitting for longer periods and balance has improved, and pt stated she is motivated to stand and walk again. Pt has shown improvement and with continued effort and skilled PT will continue to make strides in her functional mobility. Treatment Diagnosis: functional mobility deficits secondary to acute injury  Prognosis: Fair  PT Education: Goals; PT Role; Plan of Care; Transfer Training; General Safety; Weight-bearing Education; Functional Mobility Training  Patient Education: Pt unable to verbalize understanding and will require reinforcement.   Barriers to Learning: cognition  Activity Tolerance  Activity Tolerance: Patient Tolerated treatment well; Patient limited by cognitive status  Activity Tolerance: Pt was limited by cognitive status making it difficult for the pt to complete mobility. Pt did not experience pain throughout session and tolerated sitting well. Patient Diagnosis(es): The primary encounter diagnosis was Closed displaced fracture of lesser trochanter of right femur, initial encounter ([facility] [address] Diagnoses of Fall on same level from slipping, tripping or stumbling, initial encounter, Frequent falls, and Anxiety were also pertinent to this visit. has a past medical history of Anemia, Colon cancer ([facility] [address] Diabetes mellitus ([facility] [address] Fall, Gout, Hypercholesterolemia, Hypertension, Liver disease, Memory loss, Pulmonary embolism ([facility] [address] Rosacea, and Vitamin D deficiency. has a past surgical history that includes Cataract removal (Bilateral, 2010); Cholecystectomy; Dilation and curettage of uterus; Liver Resection (Right); Hip Arthroplasty (Left, 2012); total nephrectomy (Left); colectomy; and Hip fracture surgery (Right, 12/4/2021). Restrictions  Restrictions/Precautions  Restrictions/Precautions: Fall Risk, Weight Bearing (high fall risk, 50% WB on RLE)  Required Braces or Orthoses?: No  Lower Extremity Weight Bearing Restrictions  Right Lower Extremity Weight Bearing: Partial Weight Bearing  Partial Weight Bearing Percentage Or Pounds: 50%  Position Activity Restriction  Other position/activity restrictions: Patient s/p fall w/ R hip pain, diagnosed w/ right proximal femur fx s/p IM nail 12/4/2021, 50% WB RLE. Subjective   General  Chart Reviewed: Yes  Response To Previous Treatment: Patient with no complaints from previous session.   Family / Caregiver Present: No  Subjective  Subjective: pt stated she wants to stand and that is the goal she is working toward  Janice's Comment  Comments: pt was supine in bed upon arrival and agreeable to PT Orientation  Orientation  Orientation Level: Oriented to person; Disoriented to situation; Disoriented to time; Disoriented to place  Cognition   Cognition  Overall Cognitive Status: Exceptions  Arousal/Alertness: Delayed responses to stimuli  Following Commands: Inconsistently follows commands  Attention Span: Difficulty attending to directions  Memory: Decreased short term memory; Decreased recall of recent events; Decreased long term memory; Decreased recall of precautions; Decreased recall of biographical Information  Safety Judgement: Decreased awareness of need for assistance; Decreased awareness of need for safety  Problem Solving: Decreased awareness of errors; Assistance required to identify errors made; Assistance required to generate solutions; Assistance required to implement solutions; Assistance required to correct errors made  Insights: Decreased awareness of deficits  Initiation: Requires cues for all  Objective   Bed mobility  Rolling to Left: 2 Person assistance; Dependent/Total  Rolling to Right: Dependent/Total; 2 Person assistance  Supine to Sit: Dependent/Total; 2 Person assistance  Scooting: Dependent/Total; 2 Person assistance  Comment: pt required encouragement for initiation of all bed mobility, as well as increased time with HOB elevated; pt rolled multiple times for therapists to perform carie-care and change her jose manuel pad  Transfers  Comment: Not attempted due to dependent mobility with bed mobility.   Ambulation  Ambulation?: No  Stairs/Curb  Stairs?: No     Balance  Posture: Poor (forward flexed posture, rounded shoulders, extremely kyphotic)  Sitting - Static: Fair; - (ranged from CGA to max A (pt in a dolphin bed and the pressure of the bed continuously changes, making her static sitting balance variable))  Sitting - Dynamic: Poor (required max assist while eating)  Comments: pt able to sit EOB for ~ 20 minutes while practicing sitting balance and eating her lunch; pt also able to lean side to side with significant assistance to help scoot forward while sitting EOB  Exercises  Ankle Pumps: completed passively in supine x10 B                                AM-PAC Score  AM-PAC Inpatient Mobility Raw Score : 6 (12/08/21 1544)  AM-PAC Inpatient T-Scale Score : 23.55 (12/08/21 1544)  Mobility Inpatient CMS 0-100% Score: 100 (12/08/21 1544)  Mobility Inpatient CMS G-Code Modifier : CN (12/08/21 1544)          Goals  Short term goals  Time Frame for Short term goals: Discharge. Short term goal 1: Patient will perform bed mobility w/ MOD assist.  Short term goal 2: Patient will transfer supine-sit w/ MOD assist.  Short term goal 3: Patient will perform sit-stand w/ MAX assist w/ LRAD vs. [place] maintaining 50% WB status. Short term goal 4: Patient will transfer bed-chair w/ MAX assist maintaining 50% WB status. Short term goal 5: Patient will transfer bed-chair w/ slideboard and MAX assist.  Patient Goals   Patient goals : pt wants to be able to stand/walk  No goals met this treatment. Plan    Plan  Times per week: 7  Times per day: Daily  Current Treatment Recommendations: Strengthening, Home Exercise Program, ROM, Safety Education & Training, Balance Training, Endurance Training, Patient/Caregiver Education & Training, Functional Mobility Training, Transfer Training, Gait Training, Equipment Evaluation, Education, & procurement  Safety Devices  Type of devices: All fall risk precautions in place, Call light within reach, Bed alarm in place, Left in bed, Nurse notified, Amya Star in use  Restraints  Initially in place: No     Therapy Time   Individual Concurrent Group Co-treatment   Time In       1439   Time Out       1518   Minutes       39   Timed Code Treatment Minutes: [address], [facility]  Khushboo Gore, KRISTAL    Therapist observed and directed the above evaluation.  Thanks, Luzma Kraft, DPT [id number]

## 2021-12-08 NOTE — PLAN OF CARE
Problem: Falls - Risk of:  Goal: Will remain free from falls  Description: Will remain free from falls  Outcome: Ongoing  Goal: Absence of physical injury  Description: Absence of physical injury  Outcome: Ongoing     Problem: Pain:  Goal: Pain level will decrease  Description: Pain level will decrease  Outcome: Ongoing  Goal: Control of acute pain  Description: Control of acute pain  Outcome: Ongoing  Goal: Control of chronic pain  Description: Control of chronic pain  Outcome: Ongoing     Problem: Skin Integrity:  Goal: Will show no infection signs and symptoms  Description: Will show no infection signs and symptoms  Outcome: Ongoing  Goal: Absence of new skin breakdown  Description: Absence of new skin breakdown  Outcome: Ongoing  Goal: Demonstration of wound healing without infection will improve  Description: Demonstration of wound healing without infection will improve  Outcome: Ongoing  Goal: Risk for impaired skin integrity will decrease  Description: Risk for impaired skin integrity will decrease  Outcome: Ongoing     Problem:  Activity:  Goal: Ability to ambulate will improve  Description: Ability to ambulate will improve  Outcome: Ongoing  Goal: Ability to perform activities at highest level will improve  Description: Ability to perform activities at highest level will improve  Outcome: Ongoing     Problem: Health Behavior:  Goal: Identification of resources available to assist in meeting health care needs will improve  Description: Identification of resources available to assist in meeting health care needs will improve  Outcome: Ongoing     Problem: Nutritional:  Goal: Ability to attain and maintain optimal nutritional status will improve  Description: Ability to attain and maintain optimal nutritional status will improve  Outcome: Ongoing     Problem: Physical Regulation:  Goal: Will remain free from infection  Description: Will remain free from infection  Outcome: Ongoing  Goal: Postoperative complications will be avoided or minimized  Description: Postoperative complications will be avoided or minimized  Outcome: Ongoing  Goal: Diagnostic test results will improve  Description: Diagnostic test results will improve  Outcome: Ongoing     Problem: Respiratory:  Goal: Ability to maintain adequate ventilation will improve  Description: Ability to maintain adequate ventilation will improve  Outcome: Ongoing     Problem: Safety:  Goal: Ability to remain free from injury will improve  Description: Ability to remain free from injury will improve  Outcome: Ongoing     Problem: Self-Care:  Goal: Ability to meet self-care needs will improve  Description: Ability to meet self-care needs will improve  Outcome: Ongoing     Problem: Self-Concept:  Goal: Ability to maintain and perform role responsibilities to the fullest extent possible will improve  Description: Ability to maintain and perform role responsibilities to the fullest extent possible will improve  Outcome: Ongoing  Goal: Verbalizations of decreased anxiety will increase  Description: Verbalizations of decreased anxiety will increase  Outcome: Ongoing     Problem: Sensory:  Goal: Pain level will decrease  Description: Pain level will decrease  Outcome: Ongoing     Problem: Tissue Perfusion:  Goal: Peripheral tissue perfusion will improve  Description: Peripheral tissue perfusion will improve  Outcome: Ongoing  Goal: Risk of venous thrombosis will decrease  Description: Risk of venous thrombosis will decrease  Outcome: Ongoing     Problem: Urinary Elimination:  Goal: Ability to reestablish a normal urinary elimination pattern will improve - after catheter removal  Description: Ability to reestablish a normal urinary elimination pattern will improve  Outcome: Ongoing     Problem: Non-Violent Restraints  Goal: Removal from restraints as soon as assessed to be safe  Outcome: Ongoing  Goal: No harm/injury to patient while restraints in use  Outcome: Ongoing  Goal: Patient's dignity will be maintained  Outcome: Ongoing     Problem: Musculor/Skeletal Functional Status  Goal: Highest potential functional level  Outcome: Ongoing  Goal: Absence of falls  Outcome: Ongoing     Problem: Nutrition  Goal: Optimal nutrition therapy  Outcome: Ongoing

## 2021-12-08 NOTE — DISCHARGE SUMMARY
[facility] -- Physician Discharge Summary     Memo Ordonez  1935  MRN: [id number]    Admit Date: 12/3/2021  Discharge Date: No discharge date for patient encounter. Attending MD: Tj Pavon MD  Discharging MD: Tj Pavon MD  PCP: Turner Nieves [address] [facility] [place] Are[facility]o [facility] / [address] [phone]    Admission Diagnosis: Frequent falls [R29.6]  Closed displaced fracture of lesser trochanter of right femur, initial encounter [facility]) [place]  Fall on same level from slipping, tripping or stumbling, initial encounter [W01. 0XXA]  Closed subcapital fracture of femur with delayed healing, left [S72.012G]  DISCHARGE DIAGNOSIS: same    Full Hospital Problem List:  Active Hospital Problems    Diagnosis Date Noted    DEANNA (acute kidney injury) ([address] [N17.9] 12/03/2021    Closed left subtrochanteric femur fracture ([address] [S72.22XA] 12/03/2021    Closed subcapital fracture of femur with delayed healing, left [S72.012G] 12/03/2021    Essential hypertension [I10] 05/30/2019    Controlled type 2 diabetes mellitus, without long-term current use of insulin ([address] [E11.9] 05/30/2019    Pulmonary embolus ([address] [I26.99] 05/28/2019           Hospital Course:  81 y. o. female who presents by EMS from local [facility].  Patient states walking and fell causing injury to the right hip with external rotation and shortening.  She has some ecchymotic change on the forehead from a previous fall.  Patient states was walking on the sidewalk when she apparently fell as she at times does. Riana Magallon she is a nursing home patient. Abigail Cornejo is here richi on.  I am not so sure she was outside walking on a sidewalk.  She has no other related complaints regarding the fall.  Only complaint is pain right hip area.  No prior surgery on hip or knees bilateral.  I do not find evidence of ED visit regarding the head injury that occurred days ago.  The ecchymotic changes appear at least 1to 11days old.     Plain film of L hip is reviewed by self on computer    Impression:       Traumatic acute fractures of the left femur. Displaced fracture of the lesser trochanter. Spiral fracture of the proximal femoral shaft with foreshortening. Severe osteoarthritis of the right hip and knee. Left hip prosthesis, in normal position.       Orthopedics has been consulted  Taken to OR per Dr Jesus Nichole    Date of Procedure: 12/4/2021     Pre-Op Diagnosis: Right hip fracture     Post-Op Diagnosis: Same       Procedure(s):  RIGHT HIP INTERMEDULLARY NAIL, SYNTHESE, HANA TABLE, C-ARM      She does fairly well post op  Is anemic, requiring transfusions  She is hypoxic transiently  V/Q scan done which shows mod prob of PE  Pt has been on eliquis long term, and this is continued and should provide adequate treatemnt    By time of d/c, she is breathing easy on 2L  To continue this on d/c  Hgb stable at 8.9    PT/OT rec SNF  Pt to be discharged there    Consults made during Hospitalization:  2 [facility]    Treatment team at time of Discharge: Treatment Team: Attending Provider: Cem Brizuela MD; Surgeon: Dom Stark MD; Consulting Physician: Cem Brizuela MD; Consulting Physician: Dom Stark MD; Registered Nurse: Odin Boles RN; Respiratory Therapist (Day): Brooke Newman RCP; Patient Care Tech: Rayasa Magifrah    Imaging Results:  XR HIP RIGHT (2-3 VIEWS)    Result Date: 12/4/2021  Radiology exam is complete. No Radiologist dictation. Please follow up with ordering provider. XR FEMUR RIGHT (MIN 2 VIEWS)    Addendum Date: 12/3/2021    ADDENDUM: Correction. Acute fractures of the proximal RIGHT femur. Result Date: 12/3/2021  EXAMINATION: ONE XRAY VIEW OF THE PELVIS AND TWO XRAY VIEWS RIGHT HIP;   XRAY VIEWS OF THE RIGHT FEMUR 12/3/2021 5:02 pm COMPARISON: None.  HISTORY: ORDERING SYSTEM PROVIDED HISTORY: Fall with injury, shortening and external rotation TECHNOLOGIST PROVIDED HISTORY: Reason for exam:->Fall with injury, shortening and external rotation Reason for Exam: Fall (Pt brought in by ems from [facility] for fall. Pt is c/o right leg pain. Pt has bruising to right forehead from a previous fall.) Acuity: Acute Type of Exam: Initial FINDINGS: Alignment of the pelvis is normal.  There is a left hip total prosthesis in normal alignment. No hardware complication is identified. There is severe osteoarthritis of the right hip, with pronounced joint space narrowing, subchondral sclerosis and osseous hypertrophy, including over growth of the superolateral acetabulum. There is an acute fracture of the lesser trochanter which is displaced medially by 8 mm. There is a spiral fracture of the proximal 3rd of the femoral shaft with foreshortening. The distal segment positioned laterally. There is adjacent hematoma. No fracture of the more distal femur is identified. There is severe osteoarthritis of the knee, including joint space narrowing of the medial compartment. No knee joint effusion is seen. Traumatic acute fractures of the left femur. Displaced fracture of the lesser trochanter. Spiral fracture of the proximal femoral shaft with foreshortening. Severe osteoarthritis of the right hip and knee. Left hip prosthesis, in normal position. CT Head WO Contrast    Result Date: 12/3/2021  EXAMINATION: CT OF THE HEAD WITHOUT CONTRAST  12/3/2021 6:05 pm TECHNIQUE: CT of the head was performed without the administration of intravenous contrast. Dose modulation, iterative reconstruction, and/or weight based adjustment of the mA/kV was utilized to reduce the radiation dose to as low as reasonably achievable. COMPARISON: 05/28/2019 HISTORY: ORDERING SYSTEM PROVIDED HISTORY: History of fall TECHNOLOGIST PROVIDED HISTORY: Reason for exam:->History of fall Has a \"code stroke\" or \"stroke alert\" been called? ->No Decision malalignment. DEGENERATIVE CHANGES: Multilevel degenerative changes. SOFT TISSUES: There is no prevertebral soft tissue swelling. No acute abnormality of the cervical spine. NM LUNG SCAN PERFUSION ONLY    Addendum Date: 12/7/2021    ADDENDUM: Critical results were called by Dr. Nikos Costa MD to Dr. Zonia Michele on 12/7/2021 at [address] [place] [address] time. Result Date: 12/7/2021  EXAMINATION: NUCLEAR MEDICINE PERFUSION SCAN. 12/7/2021 TECHNIQUE: Ventilation not performed as part of COVID-19 safety precautions. 6.4 millicuries of Tc 43L MAA was administered intravenously prior to planar imaging of the lungs in multiple projections. COMPARISON: Chest radiograph 12/05/2021. HISTORY: ORDERING SYSTEM PROVIDED HISTORY: elevated D dimer TECHNOLOGIST PROVIDED HISTORY: Reason for exam:->elevated D dimer Reason for Exam: Elevated D Dimer Acuity: Unknown Type of Exam: Ongoing FINDINGS: PERFUSION: There is a segmental defect involving the apical segment of the right lower lobe. There is a moderate (25% to 50%) perfusion defect along the anterior margin of the lateral basal segment of the right lower lobe. According to the revised PIOPED head criteria, the exam is intermediate to high probability for pulmonary embolism. CHEST RADIOGRAPH: No focal areas of consolidation or significant effusions on recent chest radiograph. Intermediate to high probability for right lower lobe pulmonary embolism. XR CHEST PORTABLE    Result Date: 12/5/2021  EXAMINATION: ONE XRAY VIEW OF THE CHEST 12/5/2021 9:00 pm COMPARISON: 12/03/2021 HISTORY: ORDERING SYSTEM PROVIDED HISTORY: SOB TECHNOLOGIST PROVIDED HISTORY: Reason for exam:->SOB Reason for Exam: Shortness of breath Acuity: Unknown Type of Exam: Unknown Follow-up exam FINDINGS: Calcified granuloma in the right lung. Low lung volumes. There are patchy linear opacities within the lungs bilaterally. No pleural effusion or pneumothorax. Osteopenia. Stable cardiac silhouette.   The osseous structures otherwise stable     Patchy linear opacities within the lungs which are nonspecific and may reflect atelectasis. An atypical or viral pneumonia is not excluded. XR CHEST PORTABLE    Result Date: 12/3/2021  EXAMINATION: ONE XRAY VIEW OF THE CHEST 12/3/2021 5:56 pm COMPARISON: None. HISTORY: ORDERING SYSTEM PROVIDED HISTORY: Right hip fracture, preop TECHNOLOGIST PROVIDED HISTORY: Reason for exam:->Right hip fracture, preop FINDINGS: The lungs are without acute focal process. There is no effusion or pneumothorax. The cardiomediastinal silhouette is without acute process. The osseous structures are without acute process. No acute process. FLUORO FOR SURGICAL PROCEDURES    Result Date: 12/4/2021  Radiology exam is complete. No Radiologist dictation. Please follow up with ordering provider. XR HIP 2-3 VW W PELVIS RIGHT    Addendum Date: 12/3/2021    ADDENDUM: Correction. Acute fractures of the proximal RIGHT femur. Result Date: 12/3/2021  EXAMINATION: ONE XRAY VIEW OF THE PELVIS AND TWO XRAY VIEWS RIGHT HIP;   XRAY VIEWS OF THE RIGHT FEMUR 12/3/2021 5:02 pm COMPARISON: None. HISTORY: ORDERING SYSTEM PROVIDED HISTORY: Fall with injury, shortening and external rotation TECHNOLOGIST PROVIDED HISTORY: Reason for exam:->Fall with injury, shortening and external rotation Reason for Exam: Fall (Pt brought in by ems from [facility] for fall. Pt is c/o right leg pain. Pt has bruising to right forehead from a previous fall.) Acuity: Acute Type of Exam: Initial FINDINGS: Alignment of the pelvis is normal.  There is a left hip total prosthesis in normal alignment. No hardware complication is identified. There is severe osteoarthritis of the right hip, with pronounced joint space narrowing, subchondral sclerosis and osseous hypertrophy, including over growth of the superolateral acetabulum. There is an acute fracture of the lesser trochanter which is displaced medially by 8 mm.   There is a spiral fracture of the proximal 3rd of the femoral shaft with foreshortening. The distal segment positioned laterally. There is adjacent hematoma. No fracture of the more distal femur is identified. There is severe osteoarthritis of the knee, including joint space narrowing of the medial compartment. No knee joint effusion is seen. Traumatic acute fractures of the left femur. Displaced fracture of the lesser trochanter. Spiral fracture of the proximal femoral shaft with foreshortening. Severe osteoarthritis of the right hip and knee. Left hip prosthesis, in normal position. Discharge Exam: (2-7 system for EPF/Detailed, ?8 for Comprehensive)  /70   Pulse 91   Temp 98.6 °F (37 °C) (Oral)   Resp 16   Ht 5' 11\" (1.803 m)   Wt 215 lb (97.5 kg)   LMP  (LMP Unknown) Comment: post-menopausal  SpO2 94%   BMI 29.99 kg/m²   Constitutional: vitals as above: alert, appears stated age and cooperative    Psychiatric: normal insight and judgment, oriented to person, place, time, and general circumstances    Head: Normocephalic, without obvious abnormality, atraumatic    Eyes:lids and lashes normal, conjunctivae and sclerae normal and pupils equal, round, reactive to light and accomodation    EMNT: external ears normal, nares midline    Neck: no carotid bruit, supple, symmetrical, trachea midline and thyroid not enlarged, symmetric, no tenderness/mass/nodules     Respiratory: clear to auscultation and percussion bilaterally with normal respiratory effort    Cardiovascular: normal rate, regular rhythm, normal S1 and S2 and no murmurs    Gastrointestinal: soft, non-tender, non-distended, normal bowel sounds, no masses or organomegaly    Extremities: no clubbing, no edema ; dressing CDI  Skin:No rashes or nodules noted.     Neurologic:negative             Disposition: SNF    Condition: stable    Discharge Medications:     Medication List      START taking these medications    oxyCODONE 5 MG immediate release tablet  Commonly known as: ROXICODONE  Take 1 tablet by mouth every 6 hours as needed for Pain for up to 3 days. CHANGE how you take these medications    Cholecalciferol 50 MCG (2000 UT) Caps  What changed: Another medication with the same name was removed. Continue taking this medication, and follow the directions you see here. CONTINUE taking these medications    acetaminophen 325 MG tablet  Commonly known as: TYLENOL     Advil 200 MG tablet  Generic drug: ibuprofen     Eliquis 5 MG Tabs tablet  Generic drug: apixaban     furosemide 20 MG tablet  Commonly known as: LASIX  Take 1 tablet by mouth daily     lactulose 10 GM/15ML solution  Commonly known as: CHRONULAC     LORazepam 1 MG tablet  Commonly known as: ATIVAN  Take 1 tablet by mouth 2 times daily as needed for Anxiety for up to 3 days. MULTIVITAMIN PO     potassium chloride 20 MEQ extended release tablet  Commonly known as: KLOR-CON M     Pro-Stat Max Liqd     QUEtiapine 100 MG tablet  Commonly known as: SEROQUEL     torsemide 20 MG tablet  Commonly known as: DEMADEX     Zetia 10 MG tablet  Generic drug: ezetimibe        STOP taking these medications    Xarelto 20 MG Tabs tablet  Generic drug: rivaroxaban           Where to Get Your Medications      You can get these medications from any pharmacy    Bring a paper prescription for each of these medications  · LORazepam 1 MG tablet  · oxyCODONE 5 MG immediate release tablet         Allergies: Allergies   Allergen Reactions    Hydrochlorothiazide W-Triamterene Rash    Iodinated Diagnostic Agents Rash     IVP Dye caused a rash    Spironolactone Rash    Tobramycin Sulfate Swelling and Rash     Eye ointment caused redness & swelling       Follow up Instructions: Follow-up with PCP: Padmini Acevedo in 2 wk .       Total time spent on day of discharge including face-to-face visit, examination, documentation, counseling, preparation of discharge plans and followup, and discharge medicine reconciliation and presciptions is 37 minutes    (Please note that portions of this note were completed with a voice recognition program.  Efforts were made to edit the dictations but occasionally words are mis-transcribed.)      Signed:  Narinder Gan MD  12/8/2021

## 2021-12-09 VITALS
HEART RATE: 116 BPM | OXYGEN SATURATION: 97 % | WEIGHT: 193.6 LBS | DIASTOLIC BLOOD PRESSURE: 53 MMHG | BODY MASS INDEX: 27.1 KG/M2 | HEIGHT: 71 IN | RESPIRATION RATE: 16 BRPM | SYSTOLIC BLOOD PRESSURE: 117 MMHG | TEMPERATURE: 97.6 F

## 2021-12-09 LAB
ANION GAP SERPL CALCULATED.3IONS-SCNC: 8 MMOL/L (ref 3–16)
BASOPHILS ABSOLUTE: 0.1 K/UL (ref 0–0.2)
BASOPHILS RELATIVE PERCENT: 0.6 %
BUN BLDV-MCNC: 31 MG/DL (ref 7–20)
CALCIUM SERPL-MCNC: 8.7 MG/DL (ref 8.3–10.6)
CHLORIDE BLD-SCNC: 116 MMOL/L (ref 99–110)
CO2: 32 MMOL/L (ref 21–32)
CREAT SERPL-MCNC: 0.9 MG/DL (ref 0.6–1.2)
EOSINOPHILS ABSOLUTE: 0.1 K/UL (ref 0–0.6)
EOSINOPHILS RELATIVE PERCENT: 1.2 %
GFR AFRICAN AMERICAN: >60
GFR NON-AFRICAN AMERICAN: 59
GLUCOSE BLD-MCNC: 204 MG/DL (ref 70–99)
GLUCOSE BLD-MCNC: 222 MG/DL (ref 70–99)
GLUCOSE BLD-MCNC: 319 MG/DL (ref 70–99)
GLUCOSE BLD-MCNC: 321 MG/DL (ref 70–99)
HCT VFR BLD CALC: 25.6 % (ref 36–48)
HCT VFR BLD CALC: 26.8 % (ref 36–48)
HEMOGLOBIN: 8.4 G/DL (ref 12–16)
HEMOGLOBIN: 8.7 G/DL (ref 12–16)
LYMPHOCYTES ABSOLUTE: 0.8 K/UL (ref 1–5.1)
LYMPHOCYTES RELATIVE PERCENT: 8.6 %
MCH RBC QN AUTO: 30.9 PG (ref 26–34)
MCHC RBC AUTO-ENTMCNC: 32.6 G/DL (ref 31–36)
MCV RBC AUTO: 94.6 FL (ref 80–100)
MONOCYTES ABSOLUTE: 1.1 K/UL (ref 0–1.3)
MONOCYTES RELATIVE PERCENT: 10.9 %
NEUTROPHILS ABSOLUTE: 7.7 K/UL (ref 1.7–7.7)
NEUTROPHILS RELATIVE PERCENT: 78.7 %
PDW BLD-RTO: 17.2 % (ref 12.4–15.4)
PERFORMED ON: ABNORMAL
PLATELET # BLD: 196 K/UL (ref 135–450)
PMV BLD AUTO: 8.1 FL (ref 5–10.5)
POTASSIUM SERPL-SCNC: 3.9 MMOL/L (ref 3.5–5.1)
RBC # BLD: 2.83 M/UL (ref 4–5.2)
SODIUM BLD-SCNC: 156 MMOL/L (ref 136–145)
WBC # BLD: 9.7 K/UL (ref 4–11)

## 2021-12-09 PROCEDURE — 85025 COMPLETE CBC W/AUTO DIFF WBC: CPT

## 2021-12-09 PROCEDURE — 97530 THERAPEUTIC ACTIVITIES: CPT

## 2021-12-09 PROCEDURE — APPNB45 APP NON BILLABLE 31-45 MINUTES: Performed by: NURSE PRACTITIONER

## 2021-12-09 PROCEDURE — 97535 SELF CARE MNGMENT TRAINING: CPT

## 2021-12-09 PROCEDURE — 80048 BASIC METABOLIC PNL TOTAL CA: CPT

## 2021-12-09 PROCEDURE — 99024 POSTOP FOLLOW-UP VISIT: CPT | Performed by: NURSE PRACTITIONER

## 2021-12-09 PROCEDURE — 6370000000 HC RX 637 (ALT 250 FOR IP): Performed by: INTERNAL MEDICINE

## 2021-12-09 PROCEDURE — 6370000000 HC RX 637 (ALT 250 FOR IP): Performed by: ORTHOPAEDIC SURGERY

## 2021-12-09 PROCEDURE — 92526 ORAL FUNCTION THERAPY: CPT

## 2021-12-09 PROCEDURE — 2580000003 HC RX 258: Performed by: INTERNAL MEDICINE

## 2021-12-09 RX ADMIN — QUETIAPINE FUMARATE 100 MG: 100 TABLET ORAL at 09:35

## 2021-12-09 RX ADMIN — APIXABAN 5 MG: 5 TABLET, FILM COATED ORAL at 09:34

## 2021-12-09 RX ADMIN — LACTULOSE 20 G: 20 SOLUTION ORAL at 09:33

## 2021-12-09 RX ADMIN — Medication 2000 UNITS: at 09:33

## 2021-12-09 RX ADMIN — INSULIN LISPRO 6 UNITS: 100 INJECTION, SOLUTION INTRAVENOUS; SUBCUTANEOUS at 09:41

## 2021-12-09 RX ADMIN — POTASSIUM CHLORIDE 20 MEQ: 20 TABLET, EXTENDED RELEASE ORAL at 09:38

## 2021-12-09 RX ADMIN — INSULIN LISPRO 12 UNITS: 100 INJECTION, SOLUTION INTRAVENOUS; SUBCUTANEOUS at 14:04

## 2021-12-09 RX ADMIN — BISACODYL 5 MG: 5 TABLET, COATED ORAL at 09:35

## 2021-12-09 RX ADMIN — Medication 10 ML: at 09:43

## 2021-12-09 RX ADMIN — TORSEMIDE 60 MG: 20 TABLET ORAL at 09:35

## 2021-12-09 RX ADMIN — POLYETHYLENE GLYCOL 3350 17 G: 17 POWDER, FOR SOLUTION ORAL at 09:33

## 2021-12-09 RX ADMIN — SENNOSIDES AND DOCUSATE SODIUM 1 TABLET: 50; 8.6 TABLET ORAL at 09:35

## 2021-12-09 ASSESSMENT — PAIN SCALES - GENERAL
PAINLEVEL_OUTOF10: 0

## 2021-12-09 NOTE — PROGRESS NOTES
0800 AM  Shift assessment completed. Patient is alert and oriented. Patient in chair. Patient denies pain at this time. Dressing to right hip intact, dry, clean, and no drainage. Safety measures in place. Will continue to monitor patient.

## 2021-12-09 NOTE — PROGRESS NOTES
Occupational Therapy  Facility/Department: [address] ORTHO/NEURO NURSING  Daily Treatment Note  NAME: Gertrude Suárez  : 1935  MRN: [id number]    Date of Service: 2021    Discharge Recommendations:      Gertrude Suárez scored a 10/24 on the AM-PAC ADL Inpatient form. Current research shows that an AM-PAC score of 17 or less is typically not associated with a discharge to the patient's home setting. Based on the patient's AM-PAC score and their current ADL deficits, it is recommended that the patient have 3-5 sessions per week of Occupational Therapy at d/c to increase the patient's independence. Please see assessment section for further patient specific details. If patient discharges prior to next session this note will serve as a discharge summary. Please see below for the latest assessment towards goals. OT Equipment Recommendations  Equipment Needed: No  Other: defer to next level of care    Assessment   Performance deficits / Impairments: Decreased functional mobility ; Decreased endurance; Decreased ADL status; Decreased cognition; Decreased strength; Decreased safe awareness; Decreased balance  Assessment: Pt is currently functioning below occupational baseline and demo the deficits listed above. Pt is currently requiring max(A/dependentn)x2 for all bed mobility Pt is significantly limited by pain and decreased weightbearing through RLE. Pt able to tolerate lateral transfer to recliner chair Max A of 2. Pt would benefit from continued skilled OT services to address these deficits and increase IND, safety, and ease with all occupational pursuits  Treatment Diagnosis: Decreased ADL status, functional mobility, and functional transfers d/t Closed left subtrochanteric femur fracture ([facility] [address]  Prognosis: Good; Fair  OT Education: OT Role; Plan of Care; Orientation; Precautions;  ADL Adaptive Strategies; Transfer Training  Patient Education: pt is not an IND learner and will require reinforcement  Barriers to Learning: cognition  Activity Tolerance  Activity Tolerance: Patient Tolerated treatment well; Patient limited by fatigue; Treatment limited secondary to decreased cognition  Safety Devices  Safety Devices in place: Yes  Type of devices: All fall risk precautions in place; Call light within reach; Chair alarm in place; Gait belt; Patient at risk for falls; Left in chair; Nurse notified         Patient Diagnosis(es): The primary encounter diagnosis was Closed displaced fracture of lesser trochanter of right femur, initial encounter ([facility] [address] Diagnoses of Fall on same level from slipping, tripping or stumbling, initial encounter, Frequent falls, and Anxiety were also pertinent to this visit. has a past medical history of Anemia, Colon cancer ([address] Diabetes mellitus ([address] Fall, Gout, Hypercholesterolemia, Hypertension, Liver disease, Memory loss, Pulmonary embolism ([address] Rosacea, and Vitamin D deficiency. has a past surgical history that includes Cataract removal (Bilateral, 2010); Cholecystectomy; Dilation and curettage of uterus; Liver Resection (Right); Hip Arthroplasty (Left, 2012); total nephrectomy (Left); colectomy; and Hip fracture surgery (Right, 12/4/2021). Restrictions  Restrictions/Precautions  Restrictions/Precautions: Fall Risk, Weight Bearing (high fall risk, 50% WB on the R LE)  Required Braces or Orthoses?: No  Lower Extremity Weight Bearing Restrictions  Right Lower Extremity Weight Bearing: Partial Weight Bearing  Partial Weight Bearing Percentage Or Pounds: 50%  Position Activity Restriction  Other position/activity restrictions: Patient s/p fall w/ R hip pain, diagnosed w/ right proximal femur fx s/p IM nail 12/4/2021, 50% WB RLE.   Subjective   General  Chart Reviewed: Yes  Patient assessed for rehabilitation services?: Yes  Additional Pertinent Hx: PMH: Anemia, Colon cancer ([address] Diabetes mellitus ([address] Fall, Gout, Hypercholesterolemia, Hypertension, Liver disease, Memory loss, Pulmonary embolism ([facility] [address] Rosacea, and Vitamin D deficiency. Response to previous treatment: Patient with no complaints from previous session  Family / Caregiver Present: No  Referring Practitioner: Panchito Jonas MD  Diagnosis: Closed left subtrochanteric femur fracture ([facility] [address]  Subjective  Subjective: Pt supine in bed upon confused but agreeable to OT/PT co-treatment  Vital Signs  Patient Currently in Pain: Denies   Orientation  Orientation  Overall Orientation Status: Impaired  Orientation Level: Oriented to person; Disoriented to place; Disoriented to time; Disoriented to situation  Objective    ADL  Feeding: Setup; Beverage management;  Thickened liquids; Verbal cueing (SBA/supervision to consume lunch ~15 minutes EOB)  Additional Comments: Pt declined further ADL completion        Balance  Sitting Balance: Stand by assistance (min A progressing to SBA)  Standing Balance: Dependent/Total (max A of 2)  Standing Balance  Time: ~1 minute  Activity: SPT  Bed mobility  Rolling to Left: 2 Person assistance; Dependent/Total (Max A x 2)  Supine to Sit: Dependent/Total; 2 Person assistance (Max A x 2)  Scooting: Dependent/Total; 2 Person assistance (Max A x 2)  Transfers  Stand Pivot Transfers: Dependent/Total; 2 Person assistance (lateral transfer performed EOB > drop arm recliner-Max A of 2)     Cognition  Overall Cognitive Status: Exceptions  Arousal/Alertness: Delayed responses to stimuli  Following Commands: Inconsistently follows commands  Attention Span: Difficulty attending to directions  Memory: Decreased short term memory; Decreased recall of recent events; Decreased long term memory; Decreased recall of precautions; Decreased recall of biographical Information  Safety Judgement: Decreased awareness of need for assistance; Decreased awareness of need for safety  Problem Solving: Decreased awareness of errors; Assistance required to identify errors made; Assistance required to generate solutions; Assistance required to implement solutions; Assistance required to correct errors made  Insights: Decreased awareness of deficits  Initiation: Requires cues for all  Sequencing: Requires cues for all     Perception  Overall Perceptual Status: [facility]        Plan   Plan  Times per week: 7x/wk  Times per day: Daily  Current Treatment Recommendations: Strengthening, Balance Training, Functional Mobility Training, Endurance Training, Patient/Caregiver Education & Training, Safety Education & Training, Self-Care / ADL, Cognitive Reorientation    AM-PAC Score        AM-PAC Inpatient Daily Activity Raw Score: 10 (12/09/21 1201)  AM-PAC Inpatient ADL T-Scale Score : 27.31 (12/09/21 1201)  ADL Inpatient CMS 0-100% Score: 74.7 (12/09/21 1201)  ADL Inpatient CMS G-Code Modifier : CL (12/09/21 1201)    Goals  Short term goals  Time Frame for Short term goals: d/c  Short term goal 1: Pt will complete functional transfer to ADL surface at max(A)x1-ongoing 12/7, 12/8, Max A of 2 12/9  Short term goal 2: Pt will complete bed mobility with max(A)x1-ongoing 12/7, 12/8  Short term goal 3: Pt will tolerate 50% weight bearing through RLE while seated upright and completing ADL task with SBA-ongoing 12/7, 12/8, 12/9  Short term goal 4: Pt will complete UB ADLs with min(A)-ongoing 12/7, 12/8, 12/9  Short term goal 5: pt will complete LB ADLs with max(A)x1-ongoing 12/7, 12/8, 12/9  Long term goals  Time Frame for Long term goals : LTG=STG  Patient Goals   Patient goals : pt did not state       Therapy Time   Individual Concurrent Group Co-treatment   Time In       0831   Time Out       0915   Minutes       44      Timed Code Treatment Minutes:  44 Minutes  Total Treatment Minutes:  [address], [address] [place] [address]

## 2021-12-09 NOTE — PROGRESS NOTES
Data- discharge order received, pt or SON (appointed legal authority) verbalized agreement to discharge, disposition to [address] #[phone], Radha Jacobs reviewed and signed by physician. Action- AVS prepared, MONIK completed/ reported faxed by case management/. Discharge instruction summary: Diet- soft and bite size with nector thick, Activity- bed, up as tolerated, medications prescriptions to be filled at receiving facility except for the controlled prescriptions to be sent: printed prescription, Transfer code status: DNR-CC, LDAs to remain with discharge: Tobias. Response- Bedside RN to call report to receiving facility. Pt belongings gathered, peripheral IV and cardiac monitoring removed. Disposition to Discharged via ambulance to skilled nursing by EMS transportation, no complications reported. 1. WEIGHT: Admit Weight: 135 lb (61.2 kg) (12/03/21 1607)        Today  Weight: 193 lb 9.6 oz (87.8 kg) (12/09/21 0445)       2.  O2 SAT.: SpO2: 97 % (12/09/21 0930)

## 2021-12-09 NOTE — PROGRESS NOTES
[facility] Orthopedic Surgery   Progress Note    CHIEF COMPLAINT/DIAGNOSIS: S/p RIGHT hip IM nailing     SUBJECTIVE: The patient is sitting up in bed. Denies new issues. Planning on D/c to [facility] today. OBJECTIVE  Physical    VITALS:  BP (!) 118/52   Pulse 103   Temp 97.5 °F (36.4 °C) (Axillary)   Resp 16   Ht 5' 11\" (1.803 m)   Wt 193 lb 9.6 oz (87.8 kg)   LMP  (LMP Unknown) Comment: post-menopausal  SpO2 97%   BMI 27.00 kg/m²     GENERAL: Alert and oriented x3, in no acute distress. MUSCULOSKELETAL: Able to dorsi and plantarflex the ankle on operative side without issue. NO TTP about right shoulder. Full passive ROM without pain. She has 4/5 strength with right shoulder abduction/ER. No swelling, ecchymosis, or deformity noted. INCISION:  Covered with bulky dry dressing; no drainage. ROM: Limited secondary pain and swelling. Sensory:  Intact to light touch in peroneal and tibial distributions. Vascular:   2+ DP pulses with brisk cap refill;  calf soft and nontender. Data    ALL MEDICATIONS HAVE BEEN REVIEWED    CBC:   Recent Labs     12/07/21  0512 12/07/21  1425 12/08/21  0413 12/08/21  0413 12/08/21  1355 12/08/21  2345 12/09/21  0500   WBC 8.8  --  10.9  --   --   --  9.7   HGB 8.0*   < > 8.9*   < > 8.8* 8.4* 8.7*   HCT 23.9*   < > 26.5*   < > 26.4* 25.6* 26.8*     --  172  --   --   --  196    < > = values in this interval not displayed. BMP:   Recent Labs     12/07/21  0512 12/08/21  0413 12/09/21  0500    149* 156*   K 3.9 3.7 3.9   * 111* 116*   CO2 20* 29 32   BUN 37* 31* 31*   CREATININE 1.0 0.9 0.9     INR:   No results for input(s): INR in the last 72 hours. ASSESSMENT:  S/p RIGHT hip IM nailing (12/4/21), POD#5  Acute post-op blood loss anemia  DM II  DEANNA  HTN    PLAN:   - WB status:  50% weight bearing through operative extremity   - DVT prophylaxis: Eliquis resumed on 12/7  - PT/OT  - Pain Control: tylenol prn.   Due to orthopaedic surgical procedure/condition, patient may require pain medication for up to 6-8 weeks. - Acute post op acute blood loss anemia: H/H today: 8.7/26.8 (1 unit PRBC 12/7, another on 12/6 and another on 12/4. Renal: GFR/Cr improved. - ID:  Ancef x 2 doses post-op completed. - Disposition: SNF today    Follow-up in four weeks with Dr. Carl Spicer.   Office # [phone]    KEREN Sagastume - CNP  12/9/2021  10:10 AM

## 2021-12-09 NOTE — CARE COORDINATION
SW informed pt's precert was received today and ok to admit to [facility]. Transport scheduled at 2pm w/[facility]. HENS completed, packet completed and everything has been faxed. Discharge Plan:  [facility]  [address], [address]  Report = [phone]  Fax = [phone]    Transport scheduled w/[facility] at 2pm today.     Electronically signed by HANNAH Muro, RAHULW on 12/9/2021 at 11:47 AM

## 2021-12-09 NOTE — DISCHARGE INSTR - DIET

## 2021-12-09 NOTE — PROGRESS NOTES
Progress Note - Dr. Benoit Nose - Internal Medicine  PCP: Riley Dean [address] [facility] Faraz Arevalo [facility] / [facility] [address] [facility] Day: 6  Code Status: DNR-CC  Current Diet: ADULT DIET; Dysphagia - Soft and Bite Sized; Mildly Thick (Nectar); No Drinking Straws  ADULT ORAL NUTRITION SUPPLEMENT; Lunch, Dinner; Fortified Pudding Oral Supplement        CC: follow up on medical issues    Subjective:   Brent Armstrong is a 81 y.o. female. Pt seen and examined  Chart reviewed since last visit, labs and imaging below      Doing ok  Pain controlled  Did not discharge yesterday as we never heard about precert from [address] stable  Discharge order again placed today  Transport already set up    She denies chest pain, denies shortness of breath, denies nausea,  denies emesis. 10 system Review of Systems is reviewed with patient, and pertinent positives are noted in HPI above . Otherwise, Review of systems is negative. I have reviewed the patient's medical and social history in detail and updated the computerized patient record. To recap: She  has a past medical history of Anemia, Colon cancer ([address] Diabetes mellitus ([address] Fall, Gout, Hypercholesterolemia, Hypertension, Liver disease, Memory loss, Pulmonary embolism ([address] Rosacea, and Vitamin D deficiency. . She  has a past surgical history that includes Cataract removal (Bilateral, 2010); Cholecystectomy; Dilation and curettage of uterus; Liver Resection (Right); Hip Arthroplasty (Left, 2012); total nephrectomy (Left); colectomy; and Hip fracture surgery (Right, 12/4/2021). . She  reports that she quit smoking about 41 years ago. Her smoking use included cigarettes. She quit after 15.00 years of use. She has never used smokeless tobacco. She reports current alcohol use. She reports that she does not use drugs. .        Active Hospital Problems    Diagnosis Date Noted    DEANNA (acute kidney injury) ([address] [N17.9] 12/03/2021    Closed left subtrochanteric femur fracture ([facility] [S72.22XA] 12/03/2021    Closed subcapital fracture of femur with delayed healing, left [S72.012G] 12/03/2021    Essential hypertension [I10] 05/30/2019    Controlled type 2 diabetes mellitus, without long-term current use of insulin ([facility] [E11.9] 05/30/2019    Pulmonary embolus (HCC) [I26.99] 05/28/2019       Current Facility-Administered Medications: 0.9 % sodium chloride infusion, , IntraVENous, PRN  sodium chloride flush 0.9 % injection 5-40 mL, 5-40 mL, IntraVENous, BID  apixaban (ELIQUIS) tablet 5 mg, 5 mg, Oral, BID  glucose (GLUTOSE) 40 % oral gel 15 g, 15 g, Oral, PRN  dextrose 50 % IV solution, 12.5 g, IntraVENous, PRN  glucagon (rDNA) injection 1 mg, 1 mg, IntraMUSCular, PRN  dextrose 5 % solution, 100 mL/hr, IntraVENous, PRN  0.9 % sodium chloride infusion, , IntraVENous, PRN  lidocaine PF 1 % injection 5 mL, 5 mL, IntraDERmal, Once  insulin lispro (1 Unit Dial) 0-18 Units, 0-18 Units, SubCUTAneous, TID WC  insulin lispro (1 Unit Dial) 0-9 Units, 0-9 Units, SubCUTAneous, Nightly  oxyCODONE (ROXICODONE) immediate release tablet 5 mg, 5 mg, Oral, Q4H PRN  HYDROmorphone HCl PF (DILAUDID) injection 0.5 mg, 0.5 mg, IntraVENous, Q3H PRN  ondansetron (ZOFRAN) injection 4 mg, 4 mg, IntraVENous, Q6H PRN  polyethylene glycol (GLYCOLAX) packet 17 g, 17 g, Oral, Daily  bisacodyl (DULCOLAX) EC tablet 5 mg, 5 mg, Oral, Daily  sennosides-docusate sodium (SENOKOT-S) 8.6-50 MG tablet 1 tablet, 1 tablet, Oral, BID  acetaminophen (TYLENOL) tablet 650 mg, 650 mg, Oral, Q4H PRN  0.9 % sodium chloride infusion, , IntraVENous, PRN  lactulose (CHRONULAC) 10 GM/15ML solution 20 g, 20 g, Oral, BID  LORazepam (ATIVAN) tablet 1 mg, 1 mg, Oral, BID PRN  potassium chloride (KLOR-CON M) extended release tablet 20 mEq, 20 mEq, Oral, Daily with breakfast  QUEtiapine (SEROQUEL) tablet 100 mg, 100 mg, Oral, BID  torsemide (DEMADEX) tablet 60 mg, 60 mg, Oral, Daily  morphine (PF) injection 2 mg, 2 readings):   Weight   12/09/21 0445 193 lb 9.6 oz (87.8 kg)           Intake/Output Summary (Last 24 hours) at 12/9/2021 1100  Last data filed at 12/9/2021 0930  Gross per 24 hour   Intake 300 ml   Output 1750 ml   Net -1450 ml         Physical Exam:   Vitals as above  General appearance: alert, appears stated age and cooperative    Head: Normocephalic, without obvious abnormality, atraumatic    Lungs: clear to auscultation bilaterally    Heart: regular rate and rhythm, S1, S2 normal, no murmur    Abdomen: soft, non-tender; bowel sounds normal; no masses, no organomegaly    Extremities: extremities normal, atraumatic, no cyanosis, no edema  Dressing CDI    Labs:  Lab Results   Component Value Date    WBC 9.7 12/09/2021    HGB 8.7 (L) 12/09/2021    HCT 26.8 (L) 12/09/2021     12/09/2021    ALT 11 12/04/2021    AST 19 12/04/2021     (H) 12/09/2021    K 3.9 12/09/2021     (H) 12/09/2021    CREATININE 0.9 12/09/2021    BUN 31 (H) 12/09/2021    CO2 32 12/09/2021    INR 1.15 (H) 12/03/2021    LABA1C 7.0 12/03/2021    LABMICR YES 12/03/2021     Lab Results   Component Value Date    TROPONINI <0.01 12/06/2021       Recent Imaging Results are Reviewed:  XR HIP RIGHT (2-3 VIEWS)    Result Date: 12/4/2021  Radiology exam is complete. No Radiologist dictation. Please follow up with ordering provider. XR FEMUR RIGHT (MIN 2 VIEWS)    Addendum Date: 12/3/2021    ADDENDUM: Correction. Acute fractures of the proximal RIGHT femur. Result Date: 12/3/2021  EXAMINATION: ONE XRAY VIEW OF THE PELVIS AND TWO XRAY VIEWS RIGHT HIP;   XRAY VIEWS OF THE RIGHT FEMUR 12/3/2021 5:02 pm COMPARISON: None. HISTORY: ORDERING SYSTEM PROVIDED HISTORY: Fall with injury, shortening and external rotation TECHNOLOGIST PROVIDED HISTORY: Reason for exam:->Fall with injury, shortening and external rotation Reason for Exam: Fall (Pt brought in by ems from [facility] for fall. Pt is c/o right leg pain.  Pt has bruising to right forehead from a previous fall.) Acuity: Acute Type of Exam: Initial FINDINGS: Alignment of the pelvis is normal.  There is a left hip total prosthesis in normal alignment. No hardware complication is identified. There is severe osteoarthritis of the right hip, with pronounced joint space narrowing, subchondral sclerosis and osseous hypertrophy, including over growth of the superolateral acetabulum. There is an acute fracture of the lesser trochanter which is displaced medially by 8 mm. There is a spiral fracture of the proximal 3rd of the femoral shaft with foreshortening. The distal segment positioned laterally. There is adjacent hematoma. No fracture of the more distal femur is identified. There is severe osteoarthritis of the knee, including joint space narrowing of the medial compartment. No knee joint effusion is seen. Traumatic acute fractures of the left femur. Displaced fracture of the lesser trochanter. Spiral fracture of the proximal femoral shaft with foreshortening. Severe osteoarthritis of the right hip and knee. Left hip prosthesis, in normal position. CT Head WO Contrast    Result Date: 12/3/2021  EXAMINATION: CT OF THE HEAD WITHOUT CONTRAST  12/3/2021 6:05 pm TECHNIQUE: CT of the head was performed without the administration of intravenous contrast. Dose modulation, iterative reconstruction, and/or weight based adjustment of the mA/kV was utilized to reduce the radiation dose to as low as reasonably achievable. COMPARISON: 05/28/2019 HISTORY: ORDERING SYSTEM PROVIDED HISTORY: History of fall TECHNOLOGIST PROVIDED HISTORY: Reason for exam:->History of fall Has a \"code stroke\" or \"stroke alert\" been called? ->No Decision Support Exception - unselect if not a suspected or confirmed emergency medical condition->Emergency Medical Condition (MA) Reason for Exam: Fall (Pt brought in by ems from [facility] for fall. Pt is c/o right leg pain.  Pt has bruising to right forehead from a previous fall.) Acuity: Acute Type of Exam: Initial Mechanism of Injury: fall FINDINGS: BRAIN/VENTRICLES: The ventricles and sulci are diffusely enlarged. Low attenuation is seen in the periventricular and subcortical white matter. No acute intracranial hemorrhage or acute infarct is identified. ORBITS: The visualized portion of the orbits demonstrate no acute abnormality. SINUSES: The visualized paranasal sinuses and mastoid air cells demonstrate no acute abnormality. SOFT TISSUES/SKULL:  No acute abnormality of the visualized skull or soft tissues. No acute intracranial abnormality. Diffuse atrophic changes with findings suggesting chronic microvascular ischemia     CT Cervical Spine WO Contrast    Result Date: 12/3/2021  EXAMINATION: CT OF THE CERVICAL SPINE WITHOUT CONTRAST 12/3/2021 6:05 pm TECHNIQUE: CT of the cervical spine was performed without the administration of intravenous contrast. Multiplanar reformatted images are provided for review. Dose modulation, iterative reconstruction, and/or weight based adjustment of the mA/kV was utilized to reduce the radiation dose to as low as reasonably achievable. COMPARISON: None. HISTORY: ORDERING SYSTEM PROVIDED HISTORY: History of fall TECHNOLOGIST PROVIDED HISTORY: Reason for exam:->History of fall Decision Support Exception - unselect if not a suspected or confirmed emergency medical condition->Emergency Medical Condition (MA) Reason for Exam: Fall (Pt brought in by ems from [facility] for fall. Pt is c/o right leg pain. Pt has bruising to right forehead from a previous fall.) Acuity: Acute Type of Exam: Initial Mechanism of Injury: fall FINDINGS: BONES/ALIGNMENT: There is no acute fracture or traumatic malalignment. DEGENERATIVE CHANGES: Multilevel degenerative changes. SOFT TISSUES: There is no prevertebral soft tissue swelling. No acute abnormality of the cervical spine.      NM LUNG SCAN PERFUSION ONLY    Addendum Date: 12/7/2021    ADDENDUM: Critical results were called by Dr. Soraya Arvizu COMPARISON: None. HISTORY: ORDERING SYSTEM PROVIDED HISTORY: Right hip fracture, preop TECHNOLOGIST PROVIDED HISTORY: Reason for exam:->Right hip fracture, preop FINDINGS: The lungs are without acute focal process. There is no effusion or pneumothorax. The cardiomediastinal silhouette is without acute process. The osseous structures are without acute process. No acute process. FLUORO FOR SURGICAL PROCEDURES    Result Date: 12/4/2021  Radiology exam is complete. No Radiologist dictation. Please follow up with ordering provider. XR HIP 2-3 VW W PELVIS RIGHT    Addendum Date: 12/3/2021    ADDENDUM: Correction. Acute fractures of the proximal RIGHT femur. Result Date: 12/3/2021  EXAMINATION: ONE XRAY VIEW OF THE PELVIS AND TWO XRAY VIEWS RIGHT HIP;   XRAY VIEWS OF THE RIGHT FEMUR 12/3/2021 5:02 pm COMPARISON: None. HISTORY: ORDERING SYSTEM PROVIDED HISTORY: Fall with injury, shortening and external rotation TECHNOLOGIST PROVIDED HISTORY: Reason for exam:->Fall with injury, shortening and external rotation Reason for Exam: Fall (Pt brought in by ems from [facility] for fall. Pt is c/o right leg pain. Pt has bruising to right forehead from a previous fall.) Acuity: Acute Type of Exam: Initial FINDINGS: Alignment of the pelvis is normal.  There is a left hip total prosthesis in normal alignment. No hardware complication is identified. There is severe osteoarthritis of the right hip, with pronounced joint space narrowing, subchondral sclerosis and osseous hypertrophy, including over growth of the superolateral acetabulum. There is an acute fracture of the lesser trochanter which is displaced medially by 8 mm. There is a spiral fracture of the proximal 3rd of the femoral shaft with foreshortening. The distal segment positioned laterally. There is adjacent hematoma. No fracture of the more distal femur is identified.   There is severe osteoarthritis of the knee, including joint space narrowing of the medial compartment. No knee joint effusion is seen. Traumatic acute fractures of the left femur. Displaced fracture of the lesser trochanter. Spiral fracture of the proximal femoral shaft with foreshortening. Severe osteoarthritis of the right hip and knee. Left hip prosthesis, in normal position. Lab Results   Component Value Date    GLUCOSE 204 12/09/2021     Lab Results   Component Value Date    POCGLU 222 12/09/2021     BP (!) 118/52   Pulse 103   Temp 97.5 °F (36.4 °C) (Axillary)   Resp 16   Ht 5' 11\" (1.803 m)   Wt 193 lb 9.6 oz (87.8 kg)   LMP  (LMP Unknown) Comment: post-menopausal  SpO2 97%   BMI 27.00 kg/m²     Assessment and Plan:  Principal Problem:    Closed left subtrochanteric femur fracture ([address] -Established problem. Stable. Plan: for SNF when set up  Active Problems:    Pulmonary embolus ([address] -Established problem. Stable. Plan: cont eliquis    Controlled type 2 diabetes mellitus, without long-term current use of insulin ([address] -Established problem. Stable. fsbs 222  Plan: cont ccc diet, sliding scale, home meds    Essential hypertension -Established problem. Stable. 118/52  Plan: Continue present orders/plan. DEANNA (acute kidney injury) ([address] -Established problem, now resolved. Creat 0.9  Plan: Continue present orders/plan. Closed subcapital fracture of femur with delayed healing, left    Disp - to SNF when precert obtained. Discharge order placed again. Discharge meds reviewed.  Prescriptions confirmed    Time <32 m    (Please note that portions of this note were completed with a voice recognition program.  Efforts were made to edit the dictations but occasionally words are mis-transcribed.)        Narinder Gan MD  12/9/2021

## 2021-12-09 NOTE — PROGRESS NOTES
Physical Therapy  Facility/Department: [address] ORTHO/NEURO NURSING  Daily Treatment Note  NAME: Edgar Loredo  : 1935  MRN: [id number]    Date of Service: 2021  Edgar Loredo scored a  on the AM-PAC short mobility form. Current research shows that an AM-PAC score of 17 or less is typically not associated with a discharge to the patient's home setting. Based on the patient's AM-PAC score and their current functional mobility deficits, it is recommended that the patient have 3-5 sessions per week of Physical Therapy at d/c to increase the patient's independence. Please see assessment section for further patient specific details. If patient discharges prior to next session this note will serve as a discharge summary. Please see below for the latest assessment towards goals. Discharge Recommendations:  24 hour supervision or assist, 3-5 sessions per week   PT Equipment Recommendations  Equipment Needed: No  Other: Defer to next level of care. Assessment   Assessment: Pt seemed to be able to participate more during this therapy session despite continued 2 person assist for all bed mobility. Pt able to tolerate sitting for longer periods at the EOB progressing to Supervision for balance. Was able to transfer to the chair this date with Max/Total A x 2. Pt has shown improvement and with continued effort and skilled PT will continue to make strides in her functional mobility. Treatment Diagnosis: functional mobility deficits secondary to acute injury  Prognosis: Fair  PT Education: Goals; PT Role; Plan of Care; Transfer Training; General Safety; Weight-bearing Education; Functional Mobility Training  Patient Education: Pt unable to verbalize understanding and will require reinforcement. Barriers to Learning: cognition  Activity Tolerance  Activity Tolerance: Patient Tolerated treatment well; Patient limited by cognitive status  Activity Tolerance: Slight improvement in cognition noted. Was able to participate in eating tasks at the EOB     Patient Diagnosis(es): The primary encounter diagnosis was Closed displaced fracture of lesser trochanter of right femur, initial encounter ([facility] Diagnoses of Fall on same level from slipping, tripping or stumbling, initial encounter, Frequent falls, and Anxiety were also pertinent to this visit. has a past medical history of Anemia, Colon cancer ([facility] Diabetes mellitus ([facility] Fall, Gout, Hypercholesterolemia, Hypertension, Liver disease, Memory loss, Pulmonary embolism ([facility] Rosacea, and Vitamin D deficiency. has a past surgical history that includes Cataract removal (Bilateral, 2010); Cholecystectomy; Dilation and curettage of uterus; Liver Resection (Right); Hip Arthroplasty (Left, 2012); total nephrectomy (Left); colectomy; and Hip fracture surgery (Right, 12/4/2021). Restrictions  Restrictions/Precautions  Restrictions/Precautions: Fall Risk, Weight Bearing (high fall risk, 50% WB on the R LE)  Required Braces or Orthoses?: No  Lower Extremity Weight Bearing Restrictions  Right Lower Extremity Weight Bearing: Partial Weight Bearing  Partial Weight Bearing Percentage Or Pounds: 50%  Position Activity Restriction  Other position/activity restrictions: Patient s/p fall w/ R hip pain, diagnosed w/ right proximal femur fx s/p IM nail 12/4/2021, 50% WB RLE. Subjective   General  Chart Reviewed: Yes  Response To Previous Treatment: Patient with no complaints from previous session. Family / Caregiver Present: No  Subjective  Subjective: Some confusing statements d/t cognition but once sitting at the EOB improved;  Unable to rate pain d/t cognition, with movement did yell out d/t pain or anxiety  General Comment  Comments: pt was supine in bed upon arrival and agreeable to PT          Orientation  Orientation  Overall Orientation Status: Impaired  Orientation Level: Oriented to person; Disoriented to situation; Disoriented to time; Disoriented to place Daily  Current Treatment Recommendations: Strengthening, Home Exercise Program, ROM, Safety Education & Training, Balance Training, Endurance Training, Patient/Caregiver Education & Training, Functional Mobility Training, Transfer Training, Gait Training, Equipment Evaluation, Education, & procurement  Safety Devices  Type of devices:  All fall risk precautions in place, Call light within reach, Nurse notified, Gary Fabry in use, Chair alarm in place, Left in chair, Patient at risk for falls, Gait belt  Restraints  Initially in place: No     Therapy Time   Individual Concurrent Group Co-treatment   Time In       0831   Time Out       0915   Minutes       44   Timed Code Treatment Minutes: [address], [place], [id number]

## 2021-12-09 NOTE — PROGRESS NOTES
Speech Language Pathology  Dysphagia Treatment Note    Name:  Charito Gates  :   1935  Medical Diagnosis:  Frequent falls [R29.6]  Closed displaced fracture of lesser trochanter of right femur, initial encounter ([facility] Myrna Fernandes  Fall on same level from slipping, tripping or stumbling, initial encounter [W01. 0XXA]  Closed subcapital fracture of femur with delayed healing, left [S72.012G]  Treatment Diagnosis: Oropharyngeal Dysphagia  Pain level: denies     Diet level prior to treatment: Dysphagia III Soft and Bite-Sized with Mildly Thick (Nectar) Liquids, meds with puree   Tolerance of Current Diet Level: Per chart review, no documented difficulties with current diet level noted. Assessment of Texture Tolerance:  -Impressions: Pt was positioned upright in chair on RA, consuming lunch. Pt appeared pleasantly confused throughout session. She was able to self feed but was noted to eat at an impulsive rate. Pt demonstrated prolonged mastication, suspected pharyngeal pooling, delayed swallow initiation and intermittent s/s of delayed pharyngeal clearing. Episodic coughing was noted with intake this could be due to penetration/aspiration vs pharyngeal stasis. Vocal quality and respiratory status remained stable throughout session. Recommend advance to thin liquids this date with ongoing speech therapy for dysphagia tx and diet tolerance monitoring. Diet and Treatment Recommendations 2021:  Dysphagia III Soft and Bite-Sized with thin liquids, meds with puree     Compensatory strategies: Alternate solids/liquids , Upright as possible with all PO intake , Small bites/sips , Swallow 2 times per bite , Eat/feed slowly, Remain upright 30-45 min     Dysphagia Goals:   Pt will functionally tolerate recommended diet with no overt clinical s/s of aspiration (ongoing 2021)      Plan:  Continued daily Dysphagia treatment with goals per plan of care.     Patient/Family Education:Education given to the Pt and nurse, who verbalized understanding    Discharge Recommendations:  Pt will benefit from continued skilled Speech Therapy for Dysphagia services, prior to returning home. Timed Code Treatment:  0 minutes     Total Treatment Time:  15 minutes     If patient discharges prior to next session this note will serve as a discharge summary.      Handy Jung, [address] [facility]  Speech Language Pathologist

## 2021-12-09 NOTE — PLAN OF CARE
Problem: Falls - Risk of:  Goal: Will remain free from falls  Description: Will remain free from falls  Outcome: Ongoing     Problem: Pain:  Goal: Pain level will decrease  Description: Pain level will decrease  Outcome: Ongoing     Problem: Skin Integrity:  Goal: Will show no infection signs and symptoms  Description: Will show no infection signs and symptoms  Outcome: Ongoing     Problem: Sensory:  Goal: Pain level will decrease  Description: Pain level will decrease  Outcome: Ongoing

## 2021-12-13 ENCOUNTER — TELEPHONE (OUTPATIENT)
Dept: ORTHOPEDIC SURGERY | Age: 86
End: 2021-12-13

## 2021-12-13 NOTE — TELEPHONE ENCOUNTER
Nurse at [facility] is aware that staples can be removed prior to pt's 1st PO appt atleast 10-14 days from sx.

## 2021-12-15 NOTE — PROGRESS NOTES
Physician Progress Note      Eduardo Holbrook  CSN #:                  [id number]  :                       1935  ADMIT DATE:       12/3/2021 4:06 PM  Josse Snider DATE:        2021 3:38 PM  RESPONDING  PROVIDER #:        Sofie Bearden MD          QUERY TEXT:    Pt admitted with right femur fracture. Noted documentation of PMH of   pulmonary embolism on 12/3 ED note. If possible, please document in progress   notes and discharge summary the clinical indicators to support this diagnosis   on current admission or clarify current status of pulmonary embolism. The medical record reflects the following:  Risk Factors: PMH of Pulmonary embolism  Clinical Indicators: Per  VQ scan- \"According to the revised PIOPED head   criteria, the exam is intermediate to high probability for pulmonary   embolism. \" Per 12/3 H&P- past medical history of pulmonary embolism  Treatment: Eliquis, CXR, VQ scan  Options provided:  -- Pulmonary embolism currently being treated/evaluated as evidenced by,   Please document supportive evidence.   -- No clinical evidence of Pulmonary embolism currently  -- Pulmonary embolism is PMH only  -- Other - I will add my own diagnosis  -- Disagree - Not applicable / Not valid  -- Disagree - Clinically unable to determine / Unknown  -- Refer to Clinical Documentation Reviewer    PROVIDER RESPONSE TEXT:    Pulmonary embolism is currently being treated/evaluated as evidenced by vq    Query created by: Enriqueta Hodgkins on 2021 3:24 PM      Electronically signed by:  Sofie Bearden MD 12/15/2021 8:41 AM

## 2021-12-16 ENCOUNTER — OFFICE VISIT (OUTPATIENT)
Dept: ORTHOPEDIC SURGERY | Age: 86
End: 2021-12-16

## 2021-12-16 DIAGNOSIS — Z96.641 HISTORY OF RIGHT HIP HEMIARTHROPLASTY: Primary | ICD-10-CM

## 2021-12-16 PROCEDURE — 99024 POSTOP FOLLOW-UP VISIT: CPT | Performed by: ORTHOPAEDIC SURGERY

## 2021-12-16 NOTE — LETTER
[facility]  [address]  [address]  Phone: [phone]  Fax: [phone]    Jesusita Bunch MD        December 16, 2021     Patient: Ilya Franco   YOB: 1935   Date of Visit: 12/16/2021       To Whom it May Concern:    Kingsley Krause was seen in my clinic on 12/16/2021. [facility]     If you have any questions or concerns, please don't hesitate to call.     Sincerely,                   Jesusita Bunch MD

## 2021-12-17 NOTE — PROGRESS NOTES
12/16/2021     Reason for visit:  Intramedullary nailing of right proximal femur fracture on 12/4/2021    History of Present Illness:  Patient returns for postop evaluation. She is at a rehab facility. She has been partial weightbearing therefore has not been really ambulating. She reports only mild pain. No numbness or tingling. No fever or chills. Objective:  LMP  (LMP Unknown) Comment: post-menopausal     Physical Exam:  The patient is well-appearing and in no apparent distress  Examination of the right lower extremity  Incision is well-healed without evidence of infection  No pain with gentle range of motion of hip or knee  5 out of 5 strength throughout distal muscle groups  Sensation is intact to light touch throughout all distributions  There is no calf swelling or tenderness  Palpable DP pulse, brisk cap refill, 2+ symmetric reflexes    Imaging:  AP of pelvis x-ray as well as AP and pelvis of right hip as well as AP and pelvis the femur were obtained in the office today on 12 16,021 and reviewed. Intramedullary nailing is in excellent position without evidence of failure or loosening. Anatomic alignment of femur. Assessment:  Intramedullary nailing of right proximal femur fracture on 12/4/2021    Plan:  The patient is doing well. We will advance her to weightbearing as tolerated. She will return to see me in 4 weeks. Repeat x-rays at that time. Branden Desai MD            Orthopaedic Surgery Sports Medicine and [address] and [address] [facility]            Team Physician [facility] ([place])      Disclaimer: This note was dictated with voice recognition software. Though review and correction are routine, we apologize for any errors.

## 2021-12-30 ENCOUNTER — TELEPHONE (OUTPATIENT)
Dept: ORTHOPEDIC SURGERY | Age: 86
End: 2021-12-30

## 2021-12-30 NOTE — TELEPHONE ENCOUNTER
Medical Facility Question     [facility]  Contact Name: [address] Number: [phone]  Request or Information: Yuridia Pat calling from the patients facility. Advised she was transferred to them on 12/28 and the patient still has 6 staples.  Requesting a call to discuss removal.

## 2022-01-13 ENCOUNTER — OFFICE VISIT (OUTPATIENT)
Dept: ORTHOPEDIC SURGERY | Age: 87
End: 2022-01-13

## 2022-01-13 VITALS — BODY MASS INDEX: 27.02 KG/M2 | HEIGHT: 71 IN | WEIGHT: 193 LBS

## 2022-01-13 DIAGNOSIS — Z96.641 HISTORY OF RIGHT HIP HEMIARTHROPLASTY: Primary | ICD-10-CM

## 2022-01-13 PROCEDURE — 99024 POSTOP FOLLOW-UP VISIT: CPT | Performed by: ORTHOPAEDIC SURGERY

## 2022-01-13 NOTE — PROGRESS NOTES
1/13/2022     Reason for visit:  Intramedullary nailing of right proximal femur fracture on 12/4/2021    History of Present Illness:  Patient returns for postop evaluation. She is at a rehab facility. She has been minimally ambulatory. However she reports no pain. No numbness or tingling. No fever or chills. Objective:  Ht 5' 11\" (1.803 m)   Wt 193 lb (87.5 kg)   LMP  (LMP Unknown) Comment: post-menopausal  BMI 26.92 kg/m²      Physical Exam:  The patient is well-appearing and in no apparent distress  Examination of the right lower extremity  Incision is well-healed without evidence of infection  No pain with gentle range of motion of hip or knee  5 out of 5 strength throughout distal muscle groups  Sensation is intact to light touch throughout all distributions  There is no calf swelling or tenderness  Palpable DP pulse, brisk cap refill, 2+ symmetric reflexes    Imaging:  AP of pelvis x-ray as well as AP and pelvis of right hip as well as AP and pelvis the femur were obtained in the office today on 1/13/22 and reviewed. Intramedullary nailing is in excellent position without evidence of failure or loosening. Anatomic alignment of femur. Interval healing of the fracture site. Assessment:  Intramedullary nailing of right proximal femur fracture on 12/4/2021    Plan:  The patient is doing well. We will advance her to weightbearing as tolerated. She will return to see me in 12 weeks. Repeat x-rays at that time. Laura Salazar MD            Orthopaedic Surgery Sports Medicine and [address] [facility] and [address] [facility] Physician [facility] ([place])      Disclaimer: This note was dictated with voice recognition software. Though review and correction are routine, we apologize for any errors.

## 2023-03-08 NOTE — PROGRESS NOTES
Occupational Therapy  Facility/Department: [address] ORTHO/NEURO NURSING  Daily Treatment Note  NAME: Becki Tobar  : 1935  MRN: [id number]    Date of Service: 2021    Discharge Recommendations:Mellissa Bang scored a 9/24 on the AM-PAC ADL Inpatient form. Current research shows that an AM-PAC score of 17 or less is typically not associated with a discharge to the patient's home setting. Based on the patient's AM-PAC score and their current ADL deficits, it is recommended that the patient have 3-5 sessions per week of Occupational Therapy at d/c to increase the patient's independence. Please see assessment section for further patient specific details. If patient discharges prior to next session this note will serve as a discharge summary. Please see below for the latest assessment towards goals. OT Equipment Recommendations  Equipment Needed: No  Other: defer to next level of care    Assessment   Performance deficits / Impairments: Decreased functional mobility ; Decreased endurance; Decreased ADL status; Decreased cognition; Decreased strength; Decreased safe awareness; Decreased balance  Assessment: Pt is currently functioning below occupational baseline and demo the deficits listed above. Pt is currently requiring max(A)x2 for all bed mobility and unable to assess standing activities and ADLs this date d/t decreased BP. Pt is significantly limited by pain and decreased weightbearing through RLE. Abdirizak Puma  Pt would benefit from continued skilled OT services to address these deficits and increase IND, safety, and ease with all occupational pursuits  Treatment Diagnosis: Decreased ADL status, functional mobility, and functional transfers d/t Closed left subtrochanteric femur fracture ([address]  Prognosis: Good; Fair  OT Education: OT Role; Plan of Care  Patient Education: pt is not an IND learner and will require reinforcement  Barriers to Learning: cognition  REQUIRES OT FOLLOW UP: Yes  Activity Back pain Tolerance  Activity Tolerance: Patient Tolerated treatment well; Patient limited by fatigue; Treatment limited secondary to decreased cognition  Activity Tolerance: . Safety Devices  Safety Devices in place: Yes  Type of devices: All fall risk precautions in place; Patient at risk for falls; Left in bed; Bed alarm in place; Nurse notified; Call light within reach         Patient Diagnosis(es): The primary encounter diagnosis was Closed displaced fracture of lesser trochanter of right femur, initial encounter ([facility] [address] Diagnoses of Fall on same level from slipping, tripping or stumbling, initial encounter and Frequent falls were also pertinent to this visit. has a past medical history of Anemia, Colon cancer ([address] Diabetes mellitus ([address] Fall, Gout, Hypercholesterolemia, Hypertension, Liver disease, Memory loss, Pulmonary embolism ([address] Rosacea, and Vitamin D deficiency. has a past surgical history that includes Cataract removal (Bilateral, 2010); Cholecystectomy; Dilation and curettage of uterus; Liver Resection (Right); Hip Arthroplasty (Left, 2012); total nephrectomy (Left); and colectomy. Restrictions  Restrictions/Precautions  Restrictions/Precautions: Fall Risk, Weight Bearing (high fall risk, 50% WB on RLE)  Required Braces or Orthoses?: No  Lower Extremity Weight Bearing Restrictions  Right Lower Extremity Weight Bearing: Partial Weight Bearing  Partial Weight Bearing Percentage Or Pounds: 50%  Position Activity Restriction  Other position/activity restrictions: Patient s/p fall w/ R hip pain, diagnosed w/ right proximal femur fx s/p IM nail 12/4/2021, 50% WB RLE. Subjective   General  Chart Reviewed: Yes  Patient assessed for rehabilitation services?: Yes  Additional Pertinent Hx: PMH: Anemia, Colon cancer ([address] Diabetes mellitus ([address] Fall, Gout, Hypercholesterolemia, Hypertension, Liver disease, Memory loss, Pulmonary embolism ([address] Rosacea, and Vitamin D deficiency.   Family / Caregiver Back pain Back pain Present: No  Referring Practitioner: Jose Clark MD  Diagnosis: Closed left subtrochanteric femur fracture ([address]  Subjective  Subjective: Pt supine in bed upon confused but agreeable to OT evaluation and treat      Orientation   oriented to name only  Objective    ADL  Grooming: Maximum assistance ([place] to initiate face washing, patient unable to maintain task without maxA)  UE Bathing: Maximum assistance  UE Dressing: Maximum assistance  LE Dressing: Dependent/Total  Additional Comments: ADL tasks completed seated EOB        Balance  Sitting Balance:  (mod to maxA with patient leaning to L to unweight R hip, maximal cues to lean to R, reaching tasks to encourage R lean)  Standing Balance: Unable to assess(comment) (patient with decreased safety due to cognition, leaning to L, unsafe to attempt)  Standing Balance  Time: 15' EOB  Bed mobility  Rolling to Left: 2 Person assistance; Dependent/Total  Rolling to Right: Dependent/Total; 2 Person assistance  Supine to Sit: Dependent/Total; 2 Person assistance  Sit to Supine: Dependent/Total; 2 Person assistance  Scooting: Dependent/Total; 2 Person assistance  Comment: Patient with decreased initiation, A of 2 all mobility with cues and increased time.  HOB elevated, bed rail used  Transfers  Transfer Comments: unable to assess pt with decreased weigthbearing through RLE while seated EOB                       Cognition  Overall Cognitive Status: Exceptions  Arousal/Alertness: Delayed responses to stimuli  Following Commands: Inconsistently follows commands  Attention Span: Difficulty attending to directions  Memory: Decreased short term memory; Decreased recall of recent events; Decreased long term memory; Decreased recall of precautions; Decreased recall of biographical Information  Safety Judgement: Decreased awareness of need for assistance; Decreased awareness of need for safety  Problem Solving: Decreased awareness of errors; Assistance required to identify errors Back pain made; Assistance required to generate solutions; Assistance required to implement solutions; Assistance required to correct errors made  Insights: Decreased awareness of deficits  Initiation: Requires cues for all   Plan  Times per week: 7x/wk  Times per day: Daily  Current Treatment Recommendations: Strengthening, Balance Training, Functional Mobility Training, Endurance Training, Patient/Caregiver Education & Training, Safety Education & Training, Self-Care / ADL, Cognitive Reorientation    AM-PAC Score        AM-PAC Inpatient Daily Activity Raw Score: 9 (12/07/21 1119)  AM-PAC Inpatient ADL T-Scale Score : 25.33 (12/07/21 1119)  ADL Inpatient CMS 0-100% Score: 79.59 (12/07/21 1119)  ADL Inpatient CMS G-Code Modifier : CL (12/07/21 1119)    Goals  Short term goals  Time Frame for Short term goals: d/c  Short term goal 1: Pt will complete functional transfer to ADL surface at max(A)x1-ongoing 12/7  Short term goal 2: Pt will complete bed mobility with max(A)x1-ongoing 12/7  Short term goal 3: Pt will tolerate 50% weight bearing through RLE while seated upright and completing ADL task with SBA-ongoing 12/7  Short term goal 4: Pt will complete UB ADLs with min(A)-ongoing 12/7  Short term goal 5: pt will complete LB ADLs with max(A)x1-ongoing 12/7  Long term goals  Time Frame for Long term goals : LTG=STG  Patient Goals   Patient goals : pt did not state       Therapy Time   Individual Concurrent Group Co-treatment   Time In       0901   Time Out       0942   Minutes       41        Timed Code Treatment Minutes:  41 Minutes    Total Treatment Minutes:  [id number] [facility], [address] [place] [address] Back pain Back pain Back pain Back pain Back pain

## (undated) DEVICE — GAUZE,SPONGE,USP,4"X4",12PLY,STRL,10/PK: Brand: MEDLINE INDUSTRIES, INC.

## (undated) DEVICE — BIT DRL L500MM DIA6X9MM CANN STP L QUIK CPL FOR DH DC TFN

## (undated) DEVICE — SOLUTION IV IRRIG WATER 1000ML POUR BRL 2F7114

## (undated) DEVICE — SUTURE VCRL + SZ 2-0 L36IN ABSRB UD L36MM CT-1 1/2 CIR VCP945H

## (undated) DEVICE — MERCY FAIRFIELD TURNOVER KIT: Brand: MEDLINE INDUSTRIES, INC.

## (undated) DEVICE — STAPLER SKIN H3.9MM WIRE DIA0.58MM CRWN 6.9MM 35 STPL ROT

## (undated) DEVICE — SUTURE VCRL SZ 0 L36IN ABSRB UD CT-1 L36MM 1/2 CIR TAPR PNT VCP946H

## (undated) DEVICE — COVADERM: Brand: DEROYAL

## (undated) DEVICE — STANDARD HYPODERMIC NEEDLE,POLYPROPYLENE HUB: Brand: MONOJECT

## (undated) DEVICE — GOWN,AURORA,NONREINF,RAGLAN,XXL,STERILE: Brand: MEDLINE

## (undated) DEVICE — APPLICATOR MEDICATED 26 CC SOLUTION HI LT ORNG CHLORAPREP

## (undated) DEVICE — Device: Brand: COVER, PERINEAL POST, 12 PK

## (undated) DEVICE — LAMINECTOMY ARM CRADLES - COMPRESSED: Brand: SOULE MEDICAL

## (undated) DEVICE — C-ARM: Brand: UNBRANDED

## (undated) DEVICE — COVER LT HNDL BLU PLAS

## (undated) DEVICE — DRESSING PETRO W3XL8IN N ADH OIL EMUL GZ CURAD

## (undated) DEVICE — SPONGE,LAP,18"X18",DLX,XR,ST,5/PK,40/PK: Brand: MEDLINE

## (undated) DEVICE — PADDING UNDERCAST W4INXL4YD 100% COT CRIMPED FINISH WBRL II

## (undated) DEVICE — SUTURE VCRL + SZ 1 L18IN ABSRB UD L36MM CT-1 1/2 CIR VCP841D

## (undated) DEVICE — Z DISCONTINUED USE 2272117 DRAPE SURG 3 QTR N INVASIVE 2 LAYR DISP

## (undated) DEVICE — BANDAGE COBAN 4 IN COMPR W4INXL5YD FOAM COHESIVE QUIK STK SELF ADH SFT

## (undated) DEVICE — INVIEW CLEAR LEGGINGS: Brand: CONVERTORS

## (undated) DEVICE — BIT DRL L145MM DIA4.2MM NONSTERILE 3 FLUT NDL PNT QUIK CPL

## (undated) DEVICE — SOLUTION IRRIG 1000ML 0.9% SOD CHL USP POUR PLAS BTL

## (undated) DEVICE — 3M™ TEGADERM™ TRANSPARENT FILM DRESSING FRAME STYLE, 1628, 6 IN X 8 IN (15 CM X 20 CM), 10/CT 8CT/CASE: Brand: 3M™ TEGADERM™

## (undated) DEVICE — RESTRAINT EXT ANK WRST LT BLU FOAM 2 STRP SIDE BCKL HK AND

## (undated) DEVICE — MAT FLR ABS DISP

## (undated) DEVICE — ROD RM 3X950 MM W/ BALL TIP SS STRL

## (undated) DEVICE — 6619 2 PTNT ISO SYS INCISE AREA&LT;(&GT;&&LT;)&GT;P: Brand: STERI-DRAPE™ IOBAN™ 2

## (undated) DEVICE — SYRINGE, LUER LOCK, 10ML: Brand: MEDLINE

## (undated) DEVICE — GUIDEWIRE ORTH L400MM DIA3.2MM FOR TFN

## (undated) DEVICE — ELECTRODE PT RET AD L9FT HI MOIST COND ADH HYDRGEL CORDED

## (undated) DEVICE — SYRINGE 60ML BULB IRRIG ST LF

## (undated) DEVICE — MAJOR SET UP PK